# Patient Record
Sex: MALE | Race: WHITE | NOT HISPANIC OR LATINO | Employment: OTHER
[De-identification: names, ages, dates, MRNs, and addresses within clinical notes are randomized per-mention and may not be internally consistent; named-entity substitution may affect disease eponyms.]

---

## 2021-06-09 ENCOUNTER — RECORDS - HEALTHEAST (OUTPATIENT)
Dept: ADMINISTRATIVE | Facility: OTHER | Age: 31
End: 2021-06-09

## 2021-06-11 ENCOUNTER — AMBULATORY - HEALTHEAST (OUTPATIENT)
Dept: CT IMAGING | Facility: HOSPITAL | Age: 31
End: 2021-06-11

## 2021-06-11 ENCOUNTER — AMBULATORY - HEALTHEAST (OUTPATIENT)
Dept: ULTRASOUND IMAGING | Facility: HOSPITAL | Age: 31
End: 2021-06-11

## 2021-06-11 ENCOUNTER — RECORDS - HEALTHEAST (OUTPATIENT)
Dept: ADMINISTRATIVE | Facility: OTHER | Age: 31
End: 2021-06-11

## 2021-06-11 DIAGNOSIS — Z11.59 ENCOUNTER FOR SCREENING FOR OTHER VIRAL DISEASES: ICD-10-CM

## 2021-06-15 ENCOUNTER — RECORDS - HEALTHEAST (OUTPATIENT)
Dept: ADMINISTRATIVE | Facility: OTHER | Age: 31
End: 2021-06-15

## 2021-06-16 ENCOUNTER — RECORDS - HEALTHEAST (OUTPATIENT)
Dept: ADMINISTRATIVE | Facility: OTHER | Age: 31
End: 2021-06-16

## 2021-06-18 ENCOUNTER — AMBULATORY - HEALTHEAST (OUTPATIENT)
Dept: LAB | Facility: CLINIC | Age: 31
End: 2021-06-18

## 2021-06-18 DIAGNOSIS — Z11.59 ENCOUNTER FOR SCREENING FOR OTHER VIRAL DISEASES: ICD-10-CM

## 2021-06-19 LAB
SARS-COV-2 PCR COMMENT: NORMAL
SARS-COV-2 RNA SPEC QL NAA+PROBE: NEGATIVE
SARS-COV-2 VIRUS SPECIMEN SOURCE: NORMAL

## 2021-06-20 ENCOUNTER — COMMUNICATION - HEALTHEAST (OUTPATIENT)
Dept: SCHEDULING | Facility: CLINIC | Age: 31
End: 2021-06-20

## 2021-06-21 ENCOUNTER — RECORDS - HEALTHEAST (OUTPATIENT)
Dept: CT IMAGING | Facility: HOSPITAL | Age: 31
End: 2021-06-21

## 2021-06-21 ENCOUNTER — RECORDS - HEALTHEAST (OUTPATIENT)
Dept: ULTRASOUND IMAGING | Facility: HOSPITAL | Age: 31
End: 2021-06-21

## 2021-06-21 ENCOUNTER — HOSPITAL ENCOUNTER (OUTPATIENT)
Dept: CT IMAGING | Facility: HOSPITAL | Age: 31
Discharge: HOME | End: 2021-06-21

## 2021-06-21 ENCOUNTER — HOSPITAL ENCOUNTER (OUTPATIENT)
Dept: ULTRASOUND IMAGING | Facility: HOSPITAL | Age: 31
Discharge: HOME | End: 2021-06-21

## 2021-06-21 DIAGNOSIS — R79.89 ABNORMAL LFTS: ICD-10-CM

## 2021-06-21 LAB
HGB BLD-MCNC: 12.9 G/DL (ref 14–18)
INR PPP: 1.28 (ref 0.9–1.1)
PLATELET # BLD AUTO: 157 THOU/UL (ref 140–440)

## 2021-06-21 ASSESSMENT — MIFFLIN-ST. JEOR: SCORE: 1852.11

## 2021-06-22 ENCOUNTER — COMMUNICATION - HEALTHEAST (OUTPATIENT)
Dept: SCHEDULING | Facility: CLINIC | Age: 31
End: 2021-06-22

## 2021-06-23 LAB
LAB AP CHARGES (HE HISTORICAL CONVERSION): NORMAL
PATH REPORT.COMMENTS IMP SPEC: NORMAL
PATH REPORT.COMMENTS IMP SPEC: NORMAL
PATH REPORT.FINAL DX SPEC: NORMAL
PATH REPORT.GROSS SPEC: NORMAL
PATH REPORT.MICROSCOPIC SPEC OTHER STN: NORMAL
PATH REPORT.RELEVANT HX SPEC: NORMAL
RESULT FLAG (HE HISTORICAL CONVERSION): NORMAL

## 2021-07-20 VITALS — WEIGHT: 201 LBS | HEIGHT: 69 IN | BODY MASS INDEX: 29.77 KG/M2

## 2021-07-20 NOTE — PROCEDURES
[facility]    Procedure: IR Procedure Note    Date/Time: 6/21/2021 11:17 AM  Performed by: Long Everett MD  Authorized by: Long Everett MD       Universal Protocol    Site marked: Yes    Prior images obtained and reviewed: Yes    Required items: required blood products, implants, devices, and special equipment available    Patient identity confirmed: verbally with patient    Reevaluation: Patient was reevaluated immediately before administering moderate or deep sedation or anesthesia    Confirmation checklist: patient's identity using two indicators, relevant allergies, procedure was appropriate and matched the consent or emergent situation and correct equipment/implants were available    Time out: Immediately prior to procedure a time out was called to verify the correct patient, procedure, equipment, support staff and site/side marked as required    Universal Protocol: Joint Commission Universal Protocol was followed    Preparation: Patient was prepped and draped in the usual sterile fashion    ESBL (mL): 0    Anesthesia    Local anesthesia used?: Yes    Local anesthetic: lidocaine 1% without epinephrine    Anesthetic total (mL): 15    Sedation    Patient sedation: Yes    Sedation type: moderate (conscious) sedation    Sedation: midazolam, fentanyl and see MAR for details    Vital signs: Vital signs monitored during sedation  Specimens: core needle biopsy specimens sent for pathological analysis  Complications: None  Condition: Stable  Plan: Recovery for 3 hours then discharge to home.    Post-procedure    Description of procedure: US guided parenchymal biopsy left hepatic lobe    Patient tolerance: Patient tolerated the procedure well with no immediate complications   Length of time physician present for 1:1 monitoring during sedation: 15

## 2021-07-20 NOTE — PRE-PROCEDURE
Procedure Name: US guided liver parenchyma biopsy and moderate conscious sedation  Date/Time: 6/21/2021 10:13 AM    Verbal consent obtained?: Yes  Written consent obtained?: Yes  Risks and benefits: Risks, benefits and alternatives were discussed  Discharge plan: Appropriate discharge home plan in place for patients who are going home after procedure   Consent given by: patient  Expected level of sedation: moderate  ASA Class: Class 1- healthy patient  Mallampati: Grade 1- soft palate, uvula, tonsillar pillars, and posterior pharyngeal wall visible  Patient states understanding of procedure being performed: Yes  Patient's understanding of procedure matches consent: Yes  Procedure consent matches procedure scheduled: Yes  Appropriately NPO: yes  Lungs: lungs clear with good breath sounds bilaterally  Heart: normal heart sounds and rate  History & Physical reviewed: History and physical reviewed and no updates needed  Statement of review: I have reviewed the lab findings, diagnostic data, medications, and the plan for sedation

## 2022-08-13 ENCOUNTER — HOSPITAL ENCOUNTER (INPATIENT)
Facility: CLINIC | Age: 32
LOS: 6 days | Discharge: HOME | DRG: 896 | End: 2022-08-19
Attending: EMERGENCY MEDICINE | Admitting: INTERNAL MEDICINE
Payer: COMMERCIAL

## 2022-08-13 ENCOUNTER — APPOINTMENT (OUTPATIENT)
Dept: ULTRASOUND IMAGING | Facility: CLINIC | Age: 32
DRG: 896 | End: 2022-08-13
Attending: EMERGENCY MEDICINE
Payer: COMMERCIAL

## 2022-08-13 DIAGNOSIS — Z20.822 CONTACT WITH AND (SUSPECTED) EXPOSURE TO COVID-19: ICD-10-CM

## 2022-08-13 DIAGNOSIS — F41.9 ANXIETY: ICD-10-CM

## 2022-08-13 DIAGNOSIS — K72.00 ACUTE LIVER FAILURE WITHOUT HEPATIC COMA: ICD-10-CM

## 2022-08-13 DIAGNOSIS — E83.39 HYPOPHOSPHATEMIA: ICD-10-CM

## 2022-08-13 DIAGNOSIS — F10.232 ALCOHOL DEPENDENCE WITH WITHDRAWAL WITH PERCEPTUAL DISTURBANCE (H): ICD-10-CM

## 2022-08-13 DIAGNOSIS — K70.10 ALCOHOLIC HEPATITIS WITHOUT ASCITES (H): Primary | ICD-10-CM

## 2022-08-13 LAB
ABO/RH(D): NORMAL
ALBUMIN SERPL-MCNC: 2.8 G/DL (ref 3.4–5)
ALBUMIN UR-MCNC: 100 MG/DL
ALCOHOL BREATH TEST: 0 (ref 0–0.01)
ALP SERPL-CCNC: 219 U/L (ref 40–150)
ALT SERPL W P-5'-P-CCNC: 41 U/L (ref 0–70)
AMPHETAMINES UR QL SCN: NORMAL
ANION GAP SERPL CALCULATED.3IONS-SCNC: 10 MMOL/L (ref 3–14)
ANTIBODY SCREEN: NEGATIVE
APAP SERPL-MCNC: NORMAL UG/ML
APPEARANCE UR: ABNORMAL
AST SERPL W P-5'-P-CCNC: 217 U/L (ref 0–45)
BACTERIA #/AREA URNS HPF: ABNORMAL /HPF
BARBITURATES UR QL: NORMAL
BASOPHILS # BLD AUTO: 0.1 10E3/UL (ref 0–0.2)
BASOPHILS # BLD AUTO: 0.1 10E3/UL (ref 0–0.2)
BASOPHILS NFR BLD AUTO: 1 %
BASOPHILS NFR BLD AUTO: 1 %
BENZODIAZ UR QL: NORMAL
BILIRUB SERPL-MCNC: 20.6 MG/DL (ref 0.2–1.3)
BILIRUB UR QL STRIP: ABNORMAL
BUN SERPL-MCNC: 9 MG/DL (ref 7–30)
CALCIUM SERPL-MCNC: 8.1 MG/DL (ref 8.5–10.1)
CANNABINOIDS UR QL SCN: NORMAL
CHLORIDE BLD-SCNC: 94 MMOL/L (ref 94–109)
CO2 SERPL-SCNC: 27 MMOL/L (ref 20–32)
COCAINE UR QL: NORMAL
COLOR UR AUTO: ABNORMAL
CREAT SERPL-MCNC: 0.54 MG/DL (ref 0.66–1.25)
CRP SERPL-MCNC: 57 MG/L (ref 0–8)
EOSINOPHIL # BLD AUTO: 0.1 10E3/UL (ref 0–0.7)
EOSINOPHIL # BLD AUTO: 0.2 10E3/UL (ref 0–0.7)
EOSINOPHIL NFR BLD AUTO: 1 %
EOSINOPHIL NFR BLD AUTO: 2 %
ERYTHROCYTE [DISTWIDTH] IN BLOOD BY AUTOMATED COUNT: 20.5 % (ref 10–15)
ERYTHROCYTE [DISTWIDTH] IN BLOOD BY AUTOMATED COUNT: 20.8 % (ref 10–15)
ETHANOL UR QL SCN: NORMAL
FERRITIN SERPL-MCNC: 111 NG/ML (ref 26–388)
GFR SERPL CREATININE-BSD FRML MDRD: >90 ML/MIN/1.73M2
GLUCOSE BLD-MCNC: 101 MG/DL (ref 70–99)
GLUCOSE UR STRIP-MCNC: 50 MG/DL
HCT VFR BLD AUTO: 27.7 % (ref 40–53)
HCT VFR BLD AUTO: 30.3 % (ref 40–53)
HGB BLD-MCNC: 8.9 G/DL (ref 13.3–17.7)
HGB BLD-MCNC: 9.7 G/DL (ref 13.3–17.7)
HGB UR QL STRIP: ABNORMAL
HOLD SPECIMEN: NORMAL
IMM GRANULOCYTES # BLD: 0.1 10E3/UL
IMM GRANULOCYTES # BLD: 0.1 10E3/UL
IMM GRANULOCYTES NFR BLD: 1 %
IMM GRANULOCYTES NFR BLD: 1 %
INR PPP: 1.86 (ref 0.85–1.15)
IRON SATN MFR SERPL: 30 % (ref 15–46)
IRON SERPL-MCNC: 81 UG/DL (ref 35–180)
KETONES UR STRIP-MCNC: NEGATIVE MG/DL
LACTATE SERPL-SCNC: 1.9 MMOL/L (ref 0.7–2)
LDH SERPL L TO P-CCNC: 315 U/L (ref 85–227)
LEUKOCYTE ESTERASE UR QL STRIP: NEGATIVE
LYMPHOCYTES # BLD AUTO: 0.9 10E3/UL (ref 0.8–5.3)
LYMPHOCYTES # BLD AUTO: 1.2 10E3/UL (ref 0.8–5.3)
LYMPHOCYTES NFR BLD AUTO: 10 %
LYMPHOCYTES NFR BLD AUTO: 10 %
MAGNESIUM SERPL-MCNC: 1 MG/DL (ref 1.6–2.3)
MCH RBC QN AUTO: 27.3 PG (ref 26.5–33)
MCH RBC QN AUTO: 27.9 PG (ref 26.5–33)
MCHC RBC AUTO-ENTMCNC: 32 G/DL (ref 31.5–36.5)
MCHC RBC AUTO-ENTMCNC: 32.1 G/DL (ref 31.5–36.5)
MCV RBC AUTO: 85 FL (ref 78–100)
MCV RBC AUTO: 87 FL (ref 78–100)
MONOCYTES # BLD AUTO: 0.9 10E3/UL (ref 0–1.3)
MONOCYTES # BLD AUTO: 1.2 10E3/UL (ref 0–1.3)
MONOCYTES NFR BLD AUTO: 10 %
MONOCYTES NFR BLD AUTO: 11 %
MUCOUS THREADS #/AREA URNS LPF: PRESENT /LPF
NEUTROPHILS # BLD AUTO: 7.2 10E3/UL (ref 1.6–8.3)
NEUTROPHILS # BLD AUTO: 9 10E3/UL (ref 1.6–8.3)
NEUTROPHILS NFR BLD AUTO: 76 %
NEUTROPHILS NFR BLD AUTO: 76 %
NITRATE UR QL: NEGATIVE
NRBC # BLD AUTO: 0 10E3/UL
NRBC # BLD AUTO: 0 10E3/UL
NRBC BLD AUTO-RTO: 0 /100
NRBC BLD AUTO-RTO: 0 /100
OPIATES UR QL SCN: NORMAL
PH UR STRIP: 5.5 [PH] (ref 5–7)
PHOSPHATE SERPL-MCNC: 1.7 MG/DL (ref 2.5–4.5)
PLATELET # BLD AUTO: 123 10E3/UL (ref 150–450)
PLATELET # BLD AUTO: 146 10E3/UL (ref 150–450)
POTASSIUM BLD-SCNC: 3.3 MMOL/L (ref 3.4–5.3)
PROCALCITONIN SERPL-MCNC: 0.66 NG/ML
PROT SERPL-MCNC: 8 G/DL (ref 6.8–8.8)
RBC # BLD AUTO: 3.19 10E6/UL (ref 4.4–5.9)
RBC # BLD AUTO: 3.55 10E6/UL (ref 4.4–5.9)
RBC URINE: 0 /HPF
RETICS # AUTO: 0.08 10E6/UL (ref 0.03–0.1)
RETICS/RBC NFR AUTO: 2.5 % (ref 0.5–2)
SARS-COV-2 RNA RESP QL NAA+PROBE: NEGATIVE
SODIUM SERPL-SCNC: 131 MMOL/L (ref 133–144)
SP GR UR STRIP: 1.03 (ref 1–1.03)
SPECIMEN EXPIRATION DATE: NORMAL
SQUAMOUS EPITHELIAL: 3 /HPF
TIBC SERPL-MCNC: 266 UG/DL (ref 240–430)
UROBILINOGEN UR STRIP-MCNC: 2 MG/DL
WBC # BLD AUTO: 11.7 10E3/UL (ref 4–11)
WBC # BLD AUTO: 9.4 10E3/UL (ref 4–11)
WBC URINE: 19 /HPF

## 2022-08-13 PROCEDURE — 36415 COLL VENOUS BLD VENIPUNCTURE: CPT | Performed by: PHYSICIAN ASSISTANT

## 2022-08-13 PROCEDURE — 86140 C-REACTIVE PROTEIN: CPT | Performed by: PHYSICIAN ASSISTANT

## 2022-08-13 PROCEDURE — 86706 HEP B SURFACE ANTIBODY: CPT | Performed by: PHYSICIAN ASSISTANT

## 2022-08-13 PROCEDURE — 83605 ASSAY OF LACTIC ACID: CPT | Performed by: EMERGENCY MEDICINE

## 2022-08-13 PROCEDURE — 258N000003 HC RX IP 258 OP 636: Performed by: PHYSICIAN ASSISTANT

## 2022-08-13 PROCEDURE — 87635 SARS-COV-2 COVID-19 AMP PRB: CPT | Performed by: EMERGENCY MEDICINE

## 2022-08-13 PROCEDURE — 99285 EMERGENCY DEPT VISIT HI MDM: CPT | Performed by: EMERGENCY MEDICINE

## 2022-08-13 PROCEDURE — 80143 DRUG ASSAY ACETAMINOPHEN: CPT | Performed by: PHYSICIAN ASSISTANT

## 2022-08-13 PROCEDURE — 86850 RBC ANTIBODY SCREEN: CPT | Performed by: EMERGENCY MEDICINE

## 2022-08-13 PROCEDURE — 96375 TX/PRO/DX INJ NEW DRUG ADDON: CPT | Performed by: EMERGENCY MEDICINE

## 2022-08-13 PROCEDURE — 36415 COLL VENOUS BLD VENIPUNCTURE: CPT | Performed by: EMERGENCY MEDICINE

## 2022-08-13 PROCEDURE — 82040 ASSAY OF SERUM ALBUMIN: CPT | Performed by: EMERGENCY MEDICINE

## 2022-08-13 PROCEDURE — 84145 PROCALCITONIN (PCT): CPT | Performed by: PHYSICIAN ASSISTANT

## 2022-08-13 PROCEDURE — 84466 ASSAY OF TRANSFERRIN: CPT | Performed by: PHYSICIAN ASSISTANT

## 2022-08-13 PROCEDURE — 250N000011 HC RX IP 250 OP 636: Performed by: PHYSICIAN ASSISTANT

## 2022-08-13 PROCEDURE — 82728 ASSAY OF FERRITIN: CPT | Performed by: PHYSICIAN ASSISTANT

## 2022-08-13 PROCEDURE — 86708 HEPATITIS A ANTIBODY: CPT | Performed by: PHYSICIAN ASSISTANT

## 2022-08-13 PROCEDURE — 80307 DRUG TEST PRSMV CHEM ANLYZR: CPT | Performed by: EMERGENCY MEDICINE

## 2022-08-13 PROCEDURE — 99285 EMERGENCY DEPT VISIT HI MDM: CPT | Mod: 25 | Performed by: EMERGENCY MEDICINE

## 2022-08-13 PROCEDURE — 85025 COMPLETE CBC W/AUTO DIFF WBC: CPT | Performed by: EMERGENCY MEDICINE

## 2022-08-13 PROCEDURE — 83735 ASSAY OF MAGNESIUM: CPT | Performed by: EMERGENCY MEDICINE

## 2022-08-13 PROCEDURE — 258N000001 HC RX 258: Performed by: EMERGENCY MEDICINE

## 2022-08-13 PROCEDURE — 85045 AUTOMATED RETICULOCYTE COUNT: CPT | Performed by: PHYSICIAN ASSISTANT

## 2022-08-13 PROCEDURE — 250N000011 HC RX IP 250 OP 636: Performed by: EMERGENCY MEDICINE

## 2022-08-13 PROCEDURE — 83010 ASSAY OF HAPTOGLOBIN QUANT: CPT | Performed by: PHYSICIAN ASSISTANT

## 2022-08-13 PROCEDURE — 83550 IRON BINDING TEST: CPT | Performed by: PHYSICIAN ASSISTANT

## 2022-08-13 PROCEDURE — 83615 LACTATE (LD) (LDH) ENZYME: CPT | Performed by: PHYSICIAN ASSISTANT

## 2022-08-13 PROCEDURE — 82746 ASSAY OF FOLIC ACID SERUM: CPT | Performed by: PHYSICIAN ASSISTANT

## 2022-08-13 PROCEDURE — 250N000009 HC RX 250: Performed by: EMERGENCY MEDICINE

## 2022-08-13 PROCEDURE — 200N000002 HC R&B ICU UMMC

## 2022-08-13 PROCEDURE — 96366 THER/PROPH/DIAG IV INF ADDON: CPT | Performed by: EMERGENCY MEDICINE

## 2022-08-13 PROCEDURE — 82607 VITAMIN B-12: CPT | Performed by: PHYSICIAN ASSISTANT

## 2022-08-13 PROCEDURE — 87389 HIV-1 AG W/HIV-1&-2 AB AG IA: CPT | Performed by: PHYSICIAN ASSISTANT

## 2022-08-13 PROCEDURE — HZ2ZZZZ DETOXIFICATION SERVICES FOR SUBSTANCE ABUSE TREATMENT: ICD-10-PCS | Performed by: EMERGENCY MEDICINE

## 2022-08-13 PROCEDURE — 250N000013 HC RX MED GY IP 250 OP 250 PS 637: Performed by: PHYSICIAN ASSISTANT

## 2022-08-13 PROCEDURE — 84425 ASSAY OF VITAMIN B-1: CPT | Performed by: PHYSICIAN ASSISTANT

## 2022-08-13 PROCEDURE — 87040 BLOOD CULTURE FOR BACTERIA: CPT | Performed by: PHYSICIAN ASSISTANT

## 2022-08-13 PROCEDURE — 80053 COMPREHEN METABOLIC PANEL: CPT | Performed by: EMERGENCY MEDICINE

## 2022-08-13 PROCEDURE — 87340 HEPATITIS B SURFACE AG IA: CPT | Performed by: PHYSICIAN ASSISTANT

## 2022-08-13 PROCEDURE — 84100 ASSAY OF PHOSPHORUS: CPT | Performed by: PHYSICIAN ASSISTANT

## 2022-08-13 PROCEDURE — 81003 URINALYSIS AUTO W/O SCOPE: CPT | Performed by: PHYSICIAN ASSISTANT

## 2022-08-13 PROCEDURE — 76700 US EXAM ABDOM COMPLETE: CPT

## 2022-08-13 PROCEDURE — 86803 HEPATITIS C AB TEST: CPT | Performed by: PHYSICIAN ASSISTANT

## 2022-08-13 PROCEDURE — 96365 THER/PROPH/DIAG IV INF INIT: CPT | Mod: 59 | Performed by: EMERGENCY MEDICINE

## 2022-08-13 PROCEDURE — 86709 HEPATITIS A IGM ANTIBODY: CPT | Performed by: PHYSICIAN ASSISTANT

## 2022-08-13 PROCEDURE — C9803 HOPD COVID-19 SPEC COLLECT: HCPCS | Performed by: EMERGENCY MEDICINE

## 2022-08-13 PROCEDURE — 87086 URINE CULTURE/COLONY COUNT: CPT | Performed by: PHYSICIAN ASSISTANT

## 2022-08-13 PROCEDURE — 85610 PROTHROMBIN TIME: CPT | Performed by: EMERGENCY MEDICINE

## 2022-08-13 PROCEDURE — 99223 1ST HOSP IP/OBS HIGH 75: CPT | Mod: AI | Performed by: INTERNAL MEDICINE

## 2022-08-13 PROCEDURE — 99207 PR APP CREDIT; MD BILLING SHARED VISIT: CPT | Performed by: PHYSICIAN ASSISTANT

## 2022-08-13 PROCEDURE — 96367 TX/PROPH/DG ADDL SEQ IV INF: CPT | Performed by: EMERGENCY MEDICINE

## 2022-08-13 PROCEDURE — 85025 COMPLETE CBC W/AUTO DIFF WBC: CPT | Performed by: PHYSICIAN ASSISTANT

## 2022-08-13 PROCEDURE — 250N000013 HC RX MED GY IP 250 OP 250 PS 637: Performed by: EMERGENCY MEDICINE

## 2022-08-13 RX ORDER — AMOXICILLIN 250 MG
2 CAPSULE ORAL 2 TIMES DAILY PRN
Status: DISCONTINUED | OUTPATIENT
Start: 2022-08-13 | End: 2022-08-15

## 2022-08-13 RX ORDER — FLUMAZENIL 0.1 MG/ML
0.2 INJECTION, SOLUTION INTRAVENOUS
Status: DISCONTINUED | OUTPATIENT
Start: 2022-08-13 | End: 2022-08-14

## 2022-08-13 RX ORDER — LORAZEPAM 1 MG/1
1-2 TABLET ORAL EVERY 30 MIN PRN
Status: DISCONTINUED | OUTPATIENT
Start: 2022-08-13 | End: 2022-08-14

## 2022-08-13 RX ORDER — ONDANSETRON 2 MG/ML
4 INJECTION INTRAMUSCULAR; INTRAVENOUS EVERY 6 HOURS PRN
Status: DISCONTINUED | OUTPATIENT
Start: 2022-08-13 | End: 2022-08-14

## 2022-08-13 RX ORDER — MAGNESIUM SULFATE HEPTAHYDRATE 40 MG/ML
2 INJECTION, SOLUTION INTRAVENOUS ONCE
Status: COMPLETED | OUTPATIENT
Start: 2022-08-13 | End: 2022-08-13

## 2022-08-13 RX ORDER — DIAZEPAM 5 MG
5-20 TABLET ORAL EVERY 30 MIN PRN
Status: DISCONTINUED | OUTPATIENT
Start: 2022-08-13 | End: 2022-08-13 | Stop reason: CLARIF

## 2022-08-13 RX ORDER — LORAZEPAM 2 MG/ML
1-2 INJECTION INTRAMUSCULAR EVERY 30 MIN PRN
Status: DISCONTINUED | OUTPATIENT
Start: 2022-08-13 | End: 2022-08-14

## 2022-08-13 RX ORDER — MULTIPLE VITAMINS W/ MINERALS TAB 9MG-400MCG
1 TAB ORAL DAILY
Status: DISCONTINUED | OUTPATIENT
Start: 2022-08-13 | End: 2022-08-19 | Stop reason: HOSPADM

## 2022-08-13 RX ORDER — OLANZAPINE 5 MG/1
5-10 TABLET, ORALLY DISINTEGRATING ORAL EVERY 6 HOURS PRN
Status: DISCONTINUED | OUTPATIENT
Start: 2022-08-13 | End: 2022-08-15

## 2022-08-13 RX ORDER — AMOXICILLIN 250 MG
1 CAPSULE ORAL 2 TIMES DAILY PRN
Status: DISCONTINUED | OUTPATIENT
Start: 2022-08-13 | End: 2022-08-15

## 2022-08-13 RX ORDER — HALOPERIDOL 5 MG/ML
2.5-5 INJECTION INTRAMUSCULAR EVERY 6 HOURS PRN
Status: DISCONTINUED | OUTPATIENT
Start: 2022-08-13 | End: 2022-08-15

## 2022-08-13 RX ORDER — ONDANSETRON 4 MG/1
4 TABLET, ORALLY DISINTEGRATING ORAL EVERY 6 HOURS PRN
Status: DISCONTINUED | OUTPATIENT
Start: 2022-08-13 | End: 2022-08-14

## 2022-08-13 RX ORDER — FOLIC ACID 1 MG/1
1 TABLET ORAL DAILY
Status: DISCONTINUED | OUTPATIENT
Start: 2022-08-13 | End: 2022-08-14

## 2022-08-13 RX ORDER — LIDOCAINE 40 MG/G
CREAM TOPICAL
Status: DISCONTINUED | OUTPATIENT
Start: 2022-08-13 | End: 2022-08-14

## 2022-08-13 RX ORDER — DIAZEPAM 10 MG/2ML
5 INJECTION, SOLUTION INTRAMUSCULAR; INTRAVENOUS ONCE
Status: COMPLETED | OUTPATIENT
Start: 2022-08-13 | End: 2022-08-13

## 2022-08-13 RX ADMIN — OLANZAPINE 10 MG: 5 TABLET, ORALLY DISINTEGRATING ORAL at 23:28

## 2022-08-13 RX ADMIN — FOLIC ACID: 5 INJECTION, SOLUTION INTRAMUSCULAR; INTRAVENOUS; SUBCUTANEOUS at 14:42

## 2022-08-13 RX ADMIN — MAGNESIUM SULFATE 2 G: 2 INJECTION INTRAVENOUS at 15:51

## 2022-08-13 RX ADMIN — LORAZEPAM 2 MG: 1 TABLET ORAL at 20:36

## 2022-08-13 RX ADMIN — DIAZEPAM 10 MG: 5 TABLET ORAL at 15:51

## 2022-08-13 RX ADMIN — FOLIC ACID 1 MG: 1 TABLET ORAL at 20:27

## 2022-08-13 RX ADMIN — LORAZEPAM 2 MG: 1 TABLET ORAL at 21:36

## 2022-08-13 RX ADMIN — THIAMINE HYDROCHLORIDE 500 MG: 100 INJECTION, SOLUTION INTRAMUSCULAR; INTRAVENOUS at 20:27

## 2022-08-13 RX ADMIN — Medication 5 MG: at 22:30

## 2022-08-13 RX ADMIN — DIAZEPAM 5 MG: 5 INJECTION, SOLUTION INTRAMUSCULAR; INTRAVENOUS at 14:41

## 2022-08-13 RX ADMIN — OLANZAPINE 10 MG: 5 TABLET, ORALLY DISINTEGRATING ORAL at 22:30

## 2022-08-13 RX ADMIN — MULTIPLE VITAMINS W/ MINERALS TAB 1 TABLET: TAB at 20:27

## 2022-08-13 RX ADMIN — DIAZEPAM 5 MG: 5 TABLET ORAL at 14:41

## 2022-08-13 RX ADMIN — LORAZEPAM 2 MG: 1 TABLET ORAL at 23:02

## 2022-08-13 RX ADMIN — DIAZEPAM 10 MG: 5 TABLET ORAL at 18:24

## 2022-08-13 RX ADMIN — SODIUM CHLORIDE 1000 ML: 9 INJECTION, SOLUTION INTRAVENOUS at 20:27

## 2022-08-13 ASSESSMENT — ACTIVITIES OF DAILY LIVING (ADL)
ADLS_ACUITY_SCORE: 35

## 2022-08-13 NOTE — H&P
[facility]    History and Physical - Hospitalist Service, GOLD TEAM        Date of Admission:  8/13/2022    Assessment & Plan      Uriel Soria is a very pleasant 32 year old  male patient with two small children (1.6yo and 5yo) with a past medical history significant for gout and etoh use disorder complicated by hepatomegaly and elevated LFTs who presented to [facility] ED requesting detox. Was found to have bilirubin ~20 and subsequently will be admitted to [place] for presumed alcoholic hepatitis.    Acute Alcohol Withdrawal  Alcohol Use Disorder  Patient reports on average consuming 1/2-3/4L of whiskey daily. No known seizure events. His last drink was Wednesday evening 8/10/22 and since that time has noticed his skin looks yellow and urine appears dark. UDS negative. Breathalyzer with undetectable etoh level. Patient reports he feels tremulous and has had a few hallucinations since he stopped drinking etoh. On further investigation, it appears these were significant hallucinations involving monsters who were hovering around his bed, talking in his living room and creeping in the shadows and around corners when they thought he was not looking last night. He saw children come through his bedroom window. He saw a man with a disfigured face run into his son's room. On arrival to ED, he saw multiple people (who his wife confirmed were not truly there) during different instances. Patient is likely at peak withdrawal based on his last drink 8/10/22. His hallucinations have resolved thus far in ED after benzodiazepines.   - Etoh withdrawal protocol with CIWA scoring and ativan prn (safer than valium in acute liver failure). Zyprexa available if needed in addition to benzodiazepines.   - Will check thiamine level and start high dose thiamine protocol given hallucinations and he reports some gait disturbances lately.   - Recommend psychiatry consult when he  [facility].   - Electrolyte replacement    Acute Alcoholic Hepatitis  Acute on Chronic Liver Failure  Elevated Bilirubin - Jaundice  At admission, total bilirubin 20.6, , ALT 41, Alk Phos 219. INR 1.86. Creatinine 0.54. WBC 11.7. US Abdomen w Doppler done 8/13/22 revealed hepatomegaly with diffuse parenchymal echogenicity, reversal of flow throughout protal veins and splenic vein without thrombosis, splenomegaly, small volume ascites. It appears he had a liver biopsy 6/21/21 (steatohepatitis) and MR Abdomen w/wo contrast 5/4/21 (see care everywhere). In June 2021, INR 1.28 and LFTs with total bili 5.9, , ALT 84, Alk Phos 111. Reports he has never had an EGD.  MELD-Na score: 28 at 8/13/2022  2:16 PM  MELD score: 25 at 8/13/2022  2:16 PM  Calculated from:  Serum Creatinine: 0.54 mg/dL (Using min of 1 mg/dL) at 8/13/2022  2:16 PM  Serum Sodium: 131 mmol/L at 8/13/2022  2:16 PM  Total Bilirubin: 20.6 mg/dL at 8/13/2022  2:16 PM  INR(ratio): 1.86 at 8/13/2022  2:16 PM  Age: 32 years    - GI consult to determine indication for steroids and assist in further work up.   - Check CRP, procalc, blood cultures, Hep A/B/C and HIV. LDH and Haptoglobin ordered in off-chance he is hemolyzing (though doubt it). Check tylenol level.   - SBP possibility with elevated WBC count but given afebrile and no abdominal pain, will hold off on antibiotics. CAPS consult for paracentesis tomorrow.    Heart Murmur  Murmur noted heard best right sternal border.   - Given above history of etoh and elevated WBC, will check TTE.   - Check EKG for baseline    Thrombocytopenia  Anemia Normocytic  Platelets 146 at admission; previously normal in 2021. Hemoglobin 9.7 at admission; was 12.9 ~1 year ago.    - Highly likely related to etoh use. However, given elevated bilirubin, will check peripheral smear, haptoglobin and LDH to ensure not hemolyzing.   - Check iron studies, B12, folate    Hyponatremia  Sodium 131 at admission. Likely  "related to poor nutrition and etoh use.   - IVF bolus NS overnight and recheck in am    Hypokalemia  Hypomagnesemia  Hypophosphatemia  Potassium 3.3 at admission. Magnesium 1.0. Phosphorus 1.7.   - Possible risk for refeeding once his appetite improves so we will aggressively replace electrolytes.    Hypocalcemia  Ca 8.1 related to albumin 2.8.       Diet: Advance Diet as Tolerated: Clear Liquid Diet, patient suspects he is gluten intolerant  DVT Prophylaxis: Pneumatic Compression Devices  [place] Catheter: Not present  Central Lines: None  Cardiac Monitoring: None  Code Status: Full Code    Clinically Significant Risk Factors Present on Admission         # Hyponatremia: Na = 131 mmol/L (Ref range: 133 - 144 mmol/L) on admission, will monitor as appropriate   # Hypomagnesemia: Mg = 1.0 mg/dL (Ref range: 1.6 - 2.3 mg/dL) on admission, will replace as needed   # Hypoalbuminemia: Albumin = 2.8 g/dL (Ref range: 3.4 - 5.0 g/dL) on admission, will monitor as appropriate   # Coagulation Defect: INR = 1.86 (Ref range: 0.85 - 1.15) and/or PTT = N/A on admission, will monitor for bleeding      # Overweight: Estimated body mass index is 28.52 kg/m  as calculated from the following:    Height as of this encounter: 1.753 m (5' 9\").    Weight as of this encounter: 87.6 kg (193 lb 1.6 oz).        Disposition Plan    TBD     The patient's care was discussed with the Attending Physician, Dr. Dr. Chavez.    Cam Hurtado PA-C  Hospitalist Service, [facility]  Securely message with the Vocera Web Console (learn more here)  Text page via [facility] Paging/Directory   Please see signed in provider for up to date coverage information      ______________________________________________________________________    Chief Complaint   Etoh Withdrawal, Jaundice    History is obtained from the patient and EMR.    History of Present Illness   Uriel Soria is a very pleasant 32 year old "  male patient with two small children (1.4yo and 3yo) with a past medical history significant for gout and etoh use disorder complicated by hepatomegaly and elevated LFTs who presented to [facility] ED requesting detox. Was found to have bilirubin ~20 and subsequently will be admitted to [place] for presumed alcoholic hepatitis.    Patient reports on average consuming 1/2-3/4L of whiskey daily. No known seizure events. His last drink was Wednesday evening 8/10/22 and since that time has noticed his skin looks yellow and urine appears dark. Patient reports he feels tremulous and has had a few hallucinations since he stopped drinking etoh. On further investigation, it appears these were significant hallucinations involving monsters who were hovering around his bed, talking in his living room and creeping in the shadows and around corners when they thought he was not looking last night. He saw children come through his bedroom window. He saw a man with a disfigured face run into his son's room. On arrival to ED, he saw multiple people (who his wife confirmed were not truly there) during different instances. No hallucinations at present moment. No fevers, chills, chest pain/pressure, shortness of breath, nausea, vomiting, abdominal pain, dysuria reported.    Review of Systems    The 10 point Review of Systems is negative other than noted in the HPI or here.    Past Medical History    I have reviewed this patient's medical history and updated it with pertinent information if needed.   Past Medical History:   Diagnosis Date     Substance abuse (H)        Past Surgical History   I have reviewed this patient's surgical history and updated it with pertinent information if needed.  Past Surgical History:   Procedure Laterality Date     BIOPSY         Social History   I have reviewed this patient's social history and updated it with pertinent information if needed.  Social History     Tobacco Use     Smoking  status: Never Smoker     Smokeless tobacco: Former User     Types: Chew   Substance Use Topics     Alcohol use: Yes     Comment: 4 whiskey beverages daily     Drug use: Not Currently       Family History   Medical History Relation Name Comments   No Known Problems Birth Father        No Known Problems Birth Mother        Cancer, [place] Paternal Grandmother        No Known Problems Sister          Family History  Relation Name Status Comments   Birth Father    Alive     Birth Mother    Alive     Maternal Grandfather     (Age 82) had MI and bypass   Maternal Grandmother     (Age 87) old age   Paternal Grandfather     old age   Paternal Grandmother     stomach cancer   Sister    Alive           Prior to Admission Medications   None     Allergies   No Known Allergies    Physical Exam   Vital Signs: Temp: 98.4  F (36.9  C) Temp src: Oral BP: 118/77 Pulse: 107   Resp: 16 SpO2: 96 %      Weight: 193 lbs 1.6 oz    GENERAL: Alert and oriented x 3. Well nourished, well developed.  No acute distress.    HEENT: Normocephalic, atraumatic. Scleral icterus.  Mucous membranes moist. Pupils PERRLA without nystagmus. Mild tongue fasciculations.  CV: RRR. S1, S2. 2/6 murmur noted right sternal border.  RESPIRATORY: Effort normal on room air. Lungs CTAB with no wheezing, rales, or rhonchi.   GI: Abdomen soft and distended, bowel sounds present x all 4 quadrants. No tenderness, rebound, or guarding.   NEUROLOGICAL: No focal deficits. Follows commands.  Strength 5/5  in upper and lower extremities. Cranial nerves II-XII grossly intact.  MUSCULOSKELETAL: No joint swelling or tenderness. Moves all extremities.   EXTREMITIES: No gross deformities. No peripheral edema.   SKIN: Grossly warm, dry, and intact. Jaundiced.    Data   Data reviewed today: I reviewed all medications, new labs and imaging results over the last 24 hours.    Recent Labs   Lab 22  1416   WBC 11.7*   HGB 9.7*   MCV 85   *   INR  1.86*   *   POTASSIUM 3.3*   CHLORIDE 94   CO2 27   BUN 9   CR 0.54*   ANIONGAP 10   ARIES 8.1*   *   ALBUMIN 2.8*   PROTTOTAL 8.0   BILITOTAL 20.6*   ALKPHOS 219*   ALT 41   *     Recent Results (from the past 24 hour(s))   US Abdomen Complete w Doppler Complete    Narrative    EXAM: US ABDOMEN COMPLETE WITH DOPPLER COMPLETE  LOCATION: [facility]  DATE/TIME: 8/13/2022 3:58 PM    INDICATION: Acute liver failure.  COMPARISON: Abdominal ultrasound 04/20/2021.  TECHNIQUE: Complete abdominal ultrasound. Color flow with spectral Doppler and waveform analysis performed.     FINDINGS:    GALLBLADDER: Gallbladder is contracted and not well visualized. There may be some gallbladder sludge. No pericholecystic fluid or significant gallbladder wall thickening.    BILE DUCTS: No intrahepatic biliary ductal dilation. Common bile duct is not well visualized.    LIVER: Hepatomegaly with increased parenchymal echogenicity, compatible with diffuse hepatocellular disease. There is an accessory lobe measuring up to 6.3 cm. No focal liver lesions are identified.    RIGHT KIDNEY: Measures 12.5 cm in length. Normal echogenicity with no hydronephrosis or mass.     LEFT KIDNEY: Measures 13.2 cm in length. Normal echogenicity with no hydronephrosis or mass.    SPLEEN: Splenomegaly, measuring 16.7 cm.    PANCREAS: The visualized portions are normal.    AORTA: Normal in caliber.    IVC: Normal where visualized.    Small volume ascites within the right upper quadrant and both lower quadrants.    ABDOMINAL DUPLEX: Reversal of flow within the patent right, left, and main portal veins as well as the splenic vein. Hepatic veins are patent with normal anterograde flow. Hepatic arteries are patent with normal waveforms.      Impression    IMPRESSION:    1.  Hepatomegaly with increased parenchymal echogenicity, compatible with diffuse hepatocellular disease.    2.  Reversal of flow  throughout the portal veins and the splenic vein, without thrombosis. Remainder of the liver Doppler is normal.    3.  Splenomegaly.    4.  Small volume ascites.    5.  Gallbladder is contracted and may contain some sludge. No intrahepatic biliary ductal dilation. Common bile duct is not well visualized.

## 2022-08-13 NOTE — ED PROVIDER NOTES
ED Provider Note  [facility]      History     Chief Complaint   Patient presents with     Alcohol Problem     Seeking detox from alcohol; last drink was Wednesday night; averages 2-4 whiskey drinks per day; denies hx of withdrawal seizures; reports visual hallucinations and insomnia; yellow face; wife reports confusion     HPI  Uriel Soria is a 32 year old male who  has a past medical history of Substance abuse (H).  He presents to the emergency department seeking detox for alcohol abuse.  Patient reports that he usually drinks 1/2-3/4 of a liter of whiskey a day and has been doing this for a long time.  He states that he got a cold a few days ago and then decided to stop drinking on Thursday, 2 days ago.  He reports that he has been feeling shaky and has been having visual hallucinations causing him not to be able to sleep.  He has also noted over the past 3 days that he has begun to look yellow and have dark appearing urine.  He denies fevers, vomiting, blood in his stool or black or tarry stools, abdominal pain, and has no other acute complaints at this time.      Past Medical History  Past Medical History:   Diagnosis Date     Substance abuse (H)      Past Surgical History:   Procedure Laterality Date     BIOPSY       No current outpatient medications on file.    No Known Allergies  Family History  No family history on file.  Social History   Social History     Tobacco Use     Smoking status: Never Smoker     Smokeless tobacco: Former User     Types: Chew   Substance Use Topics     Alcohol use: Yes     Comment: 4 whiskey beverages daily     Drug use: Not Currently      Past medical history, past surgical history, medications, allergies, family history, and social history were reviewed with the patient. No additional pertinent items.       Review of Systems  A complete review of systems was performed with pertinent positives and negatives noted in the HPI, and all other systems  "negative.    Physical Exam   BP: 111/68  Pulse: (!) 121  Temp: 98.9  F (37.2  C)  Resp: 18  Height: 175.3 cm (5' 9\")  Weight: 87.6 kg (193 lb 1.6 oz)  SpO2: 96 %  Physical Exam  Vitals and nursing note reviewed.   Constitutional:       General: He is not in acute distress.     Appearance: He is well-developed. He is ill-appearing and diaphoretic. He is not toxic-appearing.      Comments: Patient is awake and alert, he appears diaphoretic and jaundiced.  Mild tremors noted.  He is mentating normally and protecting his airway without difficulty.   HENT:      Head: Normocephalic and atraumatic.      Mouth/Throat:      Lips: Pink.      Mouth: Mucous membranes are moist.      Pharynx: Oropharynx is clear. No oropharyngeal exudate.   Eyes:      General: Lids are normal. Scleral icterus present.      Extraocular Movements: Extraocular movements intact.      Right eye: No nystagmus.      Left eye: No nystagmus.      Conjunctiva/sclera: Conjunctivae normal.      Pupils: Pupils are equal, round, and reactive to light.   Neck:      Thyroid: No thyromegaly.      Vascular: No JVD.      Trachea: No tracheal deviation.   Cardiovascular:      Rate and Rhythm: Regular rhythm. Tachycardia present.      Pulses: Normal pulses.      Heart sounds: Normal heart sounds. No murmur heard.    No friction rub. No gallop.   Pulmonary:      Effort: Pulmonary effort is normal. No respiratory distress.      Breath sounds: Normal breath sounds.   Abdominal:      General: Bowel sounds are normal. There is distension.      Palpations: Abdomen is soft. There is no mass.      Tenderness: There is no abdominal tenderness. There is no guarding or rebound.   Musculoskeletal:         General: No tenderness. Normal range of motion.      Cervical back: Normal range of motion and neck supple. No erythema or rigidity.      Right lower leg: No edema.      Left lower leg: No edema.   Lymphadenopathy:      Cervical: No cervical adenopathy.   Skin:     General: " Skin is warm.      Capillary Refill: Capillary refill takes 2 to 3 seconds.      Coloration: Skin is jaundiced. Skin is not pale.      Findings: No erythema or rash.   Neurological:      Mental Status: He is alert and oriented to person, place, and time.      Cranial Nerves: No cranial nerve deficit.      Sensory: No sensory deficit.      Motor: Tremor present.   Psychiatric:         Mood and Affect: Mood and affect normal.         Speech: Speech normal.         Behavior: Behavior normal.         ED Course      Procedures                     Results for orders placed or performed during the hospital encounter of 08/13/22   US Abdomen Complete w Doppler Complete     Status: None    Narrative    EXAM: US ABDOMEN COMPLETE WITH DOPPLER COMPLETE  LOCATION: [facility]  DATE/TIME: 8/13/2022 3:58 PM    INDICATION: Acute liver failure.  COMPARISON: Abdominal ultrasound 04/20/2021.  TECHNIQUE: Complete abdominal ultrasound. Color flow with spectral Doppler and waveform analysis performed.     FINDINGS:    GALLBLADDER: Gallbladder is contracted and not well visualized. There may be some gallbladder sludge. No pericholecystic fluid or significant gallbladder wall thickening.    BILE DUCTS: No intrahepatic biliary ductal dilation. Common bile duct is not well visualized.    LIVER: Hepatomegaly with increased parenchymal echogenicity, compatible with diffuse hepatocellular disease. There is an accessory lobe measuring up to 6.3 cm. No focal liver lesions are identified.    RIGHT KIDNEY: Measures 12.5 cm in length. Normal echogenicity with no hydronephrosis or mass.     LEFT KIDNEY: Measures 13.2 cm in length. Normal echogenicity with no hydronephrosis or mass.    SPLEEN: Splenomegaly, measuring 16.7 cm.    PANCREAS: The visualized portions are normal.    AORTA: Normal in caliber.    IVC: Normal where visualized.    Small volume ascites within the right upper quadrant and both lower  quadrants.    ABDOMINAL DUPLEX: Reversal of flow within the patent right, left, and main portal veins as well as the splenic vein. Hepatic veins are patent with normal anterograde flow. Hepatic arteries are patent with normal waveforms.      Impression    IMPRESSION:    1.  Hepatomegaly with increased parenchymal echogenicity, compatible with diffuse hepatocellular disease.    2.  Reversal of flow throughout the portal veins and the splenic vein, without thrombosis. Remainder of the liver Doppler is normal.    3.  Splenomegaly.    4.  Small volume ascites.    5.  Gallbladder is contracted and may contain some sludge. No intrahepatic biliary ductal dilation. Common bile duct is not well visualized.   INR     Status: Abnormal   Result Value Ref Range    INR 1.86 (H) 0.85 - 1.15   Comprehensive metabolic panel     Status: Abnormal   Result Value Ref Range    Sodium 131 (L) 133 - 144 mmol/L    Potassium 3.3 (L) 3.4 - 5.3 mmol/L    Chloride 94 94 - 109 mmol/L    Carbon Dioxide (CO2) 27 20 - 32 mmol/L    Anion Gap 10 3 - 14 mmol/L    Urea Nitrogen 9 7 - 30 mg/dL    Creatinine 0.54 (L) 0.66 - 1.25 mg/dL    Calcium 8.1 (L) 8.5 - 10.1 mg/dL    Glucose 101 (H) 70 - 99 mg/dL    Alkaline Phosphatase 219 (H) 40 - 150 U/L     (H) 0 - 45 U/L    ALT 41 0 - 70 U/L    Protein Total 8.0 6.8 - 8.8 g/dL    Albumin 2.8 (L) 3.4 - 5.0 g/dL    Bilirubin Total 20.6 (HH) 0.2 - 1.3 mg/dL    GFR Estimate >90 >60 mL/min/1.73m2   Lactic acid whole blood     Status: Normal   Result Value Ref Range    Lactic Acid 1.9 0.7 - 2.0 mmol/L   Magnesium     Status: Abnormal   Result Value Ref Range    Magnesium 1.0 (L) 1.6 - 2.3 mg/dL   Drug abuse screen 6 urine (chem dep) ([facility])     Status: Normal   Result Value Ref Range    Amphetamines Urine Screen Negative Screen Negative    Barbiturates Urine Screen Negative Screen Negative    Benzodiazepines Urine Screen Negative Screen Negative    Cannabinoids Urine Screen Negative Screen Negative    Cocaine  Urine Screen Negative Screen Negative    Ethanol Urine Screen Negative Screen Negative    Opiates Urine Screen Negative Screen Negative   Asymptomatic COVID-19 Virus (Coronavirus) by PCR Nasopharyngeal     Status: Normal    Specimen: Nasopharyngeal; Swab   Result Value Ref Range    SARS CoV2 PCR Negative Negative    Narrative    Testing was performed using the loly  SARS-CoV-2 & Influenza A/B Assay on the loly  [place]  System.  This test should be ordered for the detection of SARS-COV-2 in individuals who meet SARS-CoV-2 clinical and/or epidemiological criteria. Test performance is unknown in asymptomatic patients.  This test is for in vitro diagnostic use under the FDA EUA for laboratories certified under CLIA to perform moderate and/or high complexity testing. This test has not been FDA cleared or approved.  A negative test does not rule out the presence of PCR inhibitors in the specimen or target RNA in concentration below the limit of detection for the assay. The possibility of a false negative should be considered if the patient's recent exposure or clinical presentation suggests COVID-19.  [facility] are certified under the Clinical Laboratory Improvement Amendments of 1988 (CLIA-88) as qualified to perform moderate and/or high complexity laboratory testing.   CBC with platelets and differential     Status: Abnormal   Result Value Ref Range    WBC Count 11.7 (H) 4.0 - 11.0 10e3/uL    RBC Count 3.55 (L) 4.40 - 5.90 10e6/uL    Hemoglobin 9.7 (L) 13.3 - 17.7 g/dL    Hematocrit 30.3 (L) 40.0 - 53.0 %    MCV 85 78 - 100 fL    MCH 27.3 26.5 - 33.0 pg    MCHC 32.0 31.5 - 36.5 g/dL    RDW 20.5 (H) 10.0 - 15.0 %    Platelet Count 146 (L) 150 - 450 10e3/uL    % Neutrophils 76 %    % Lymphocytes 10 %    % Monocytes 11 %    % Eosinophils 1 %    % Basophils 1 %    % Immature Granulocytes 1 %    NRBCs per 100 WBC 0 <1 /100    Absolute Neutrophils 9.0 (H) 1.6 - 8.3 10e3/uL    Absolute Lymphocytes 1.2 0.8  - 5.3 10e3/uL    Absolute Monocytes 1.2 0.0 - 1.3 10e3/uL    Absolute Eosinophils 0.1 0.0 - 0.7 10e3/uL    Absolute Basophils 0.1 0.0 - 0.2 10e3/uL    Absolute Immature Granulocytes 0.1 <=0.4 10e3/uL    Absolute NRBCs 0.0 10e3/uL   [place] Draw     Status: None    Narrative    The following orders were created for panel order [place] Draw.  Procedure                               Abnormality         Status                     ---------                               -----------         ------                     Extra Red Top Tube[[id number]]                               Final result                 Please view results for these tests on the individual orders.   Extra Red Top Tube     Status: None   Result Value Ref Range    Hold Specimen [facility]    Adult Type and Screen     Status: None   Result Value Ref Range    ABO/RH(D) O POS     Antibody Screen Negative Negative    SPECIMEN EXPIRATION DATE [id number]    CBC with platelets differential     Status: Abnormal    Narrative    The following orders were created for panel order CBC with platelets differential.  Procedure                               Abnormality         Status                     ---------                               -----------         ------                     CBC with platelets and d...[[id number]]  Abnormal            Final result                 Please view results for these tests on the individual orders.   ABO/Rh type and screen     Status: None    Narrative    The following orders were created for panel order ABO/Rh type and screen.  Procedure                               Abnormality         Status                     ---------                               -----------         ------                     Adult Type and Screen[[id number]]                            Final result                 Please view results for these tests on the individual orders.     Medications   diazepam (VALIUM) tablet 5-20 mg (10 mg Oral Given 8/13/22 4049)    dextrose 5% and 0.45% NaCl 1,000 mL with Infuvite Adult 10 mL, thiamine 100 mg, folic acid 1 mg infusion ( Intravenous Stopped 8/13/22 1545)   diazepam (VALIUM) injection 5 mg (5 mg Intravenous Given 8/13/22 1441)   magnesium sulfate 2 g in water intermittent infusion (0 g Intravenous Stopped 8/13/22 1700)        Assessments & Plan (with Medical Decision Making)     This patient presented to the emergency department seeking detox for alcohol dependence.  Does appear to be going through withdrawal with tachycardia, visual hallucinations, diaphoresis and tremors.  He also appears acutely jaundiced and has findings on lab testing indicative of liver failure with a total bilirubin elevated at 20.6 and elevation of alk phos and transaminases.  INR is elevated indicative of synthetic dysfunction.  Magnesium was low, replacement was started in the emergency department.  Patient was placed on the MSSA protocol and was given a dose of Valium IV, but is holding down fluids and subsequent doses will be oral.  Symptoms and heart rate improved with treatment of withdrawal.  He was given a banana bag.  I feel that he is to ill at this point to admit to a detox unit.  Abdominal ultrasound with Doppler was ordered to evaluate for biliary obstruction, portal hypertension or portal thrombosis.  This demonstrated no evidence of biliary pathology.  There was evidence to suggest portal venous hypertension.  Case was discussed with the triage hospitalist.  Patient will be admitted to the internal medicine service on the [facility].    I have reviewed the nursing notes. I have reviewed the findings, diagnosis, plan and need for follow up with the patient.    New Prescriptions    No medications on file       Final diagnoses:   Alcohol dependence with withdrawal with perceptual disturbance (H)   Acute liver failure without hepatic coma       --  Lucio Bone  [facility] EMERGENCY DEPARTMENT  8/13/2022      Lucio Bone MD  08/13/22 6001

## 2022-08-14 ENCOUNTER — APPOINTMENT (OUTPATIENT)
Dept: GENERAL RADIOLOGY | Facility: CLINIC | Age: 32
DRG: 896 | End: 2022-08-14
Attending: NURSE PRACTITIONER
Payer: COMMERCIAL

## 2022-08-14 ENCOUNTER — APPOINTMENT (OUTPATIENT)
Dept: CARDIOLOGY | Facility: CLINIC | Age: 32
DRG: 896 | End: 2022-08-14
Attending: NURSE PRACTITIONER
Payer: COMMERCIAL

## 2022-08-14 LAB
ALBUMIN SERPL-MCNC: 2.4 G/DL (ref 3.4–5)
ALP SERPL-CCNC: 187 U/L (ref 40–150)
ALT SERPL W P-5'-P-CCNC: 37 U/L (ref 0–70)
AMMONIA PLAS-SCNC: <10 UMOL/L (ref 10–50)
ANION GAP SERPL CALCULATED.3IONS-SCNC: 8 MMOL/L (ref 3–14)
APAP SERPL-MCNC: <5 UG/ML
AST SERPL W P-5'-P-CCNC: 183 U/L (ref 0–45)
BILIRUB SERPL-MCNC: 18.7 MG/DL (ref 0.2–1.3)
BUN SERPL-MCNC: 9 MG/DL (ref 7–30)
C PNEUM DNA SPEC QL NAA+PROBE: NOT DETECTED
CA-I BLD-MCNC: 4.2 MG/DL (ref 4.4–5.2)
CALCIUM SERPL-MCNC: 7.7 MG/DL (ref 8.5–10.1)
CHLORIDE BLD-SCNC: 99 MMOL/L (ref 94–109)
CO2 SERPL-SCNC: 26 MMOL/L (ref 20–32)
CREAT SERPL-MCNC: 0.59 MG/DL (ref 0.66–1.25)
ERYTHROCYTE [DISTWIDTH] IN BLOOD BY AUTOMATED COUNT: 20.7 % (ref 10–15)
FLUAV H1 2009 PAND RNA SPEC QL NAA+PROBE: NOT DETECTED
FLUAV H1 RNA SPEC QL NAA+PROBE: NOT DETECTED
FLUAV H3 RNA SPEC QL NAA+PROBE: NOT DETECTED
FLUAV RNA SPEC QL NAA+PROBE: NOT DETECTED
FLUBV RNA SPEC QL NAA+PROBE: NOT DETECTED
FOLATE SERPL-MCNC: 6.3 NG/ML (ref 4.6–34.8)
GFR SERPL CREATININE-BSD FRML MDRD: >90 ML/MIN/1.73M2
GLUCOSE BLD-MCNC: 96 MG/DL (ref 70–99)
GLUCOSE BLDC GLUCOMTR-MCNC: 88 MG/DL (ref 70–99)
HADV DNA SPEC QL NAA+PROBE: NOT DETECTED
HCOV PNL SPEC NAA+PROBE: NOT DETECTED
HCT VFR BLD AUTO: 26.6 % (ref 40–53)
HGB BLD-MCNC: 8.5 G/DL (ref 13.3–17.7)
HMPV RNA SPEC QL NAA+PROBE: NOT DETECTED
HPIV1 RNA SPEC QL NAA+PROBE: NOT DETECTED
HPIV2 RNA SPEC QL NAA+PROBE: NOT DETECTED
HPIV3 RNA SPEC QL NAA+PROBE: NOT DETECTED
HPIV4 RNA SPEC QL NAA+PROBE: NOT DETECTED
INR PPP: 1.97 (ref 0.85–1.15)
LACTATE SERPL-SCNC: 1.5 MMOL/L (ref 0.7–2)
LIPASE SERPL-CCNC: 243 U/L (ref 73–393)
LVEF ECHO: NORMAL
M PNEUMO DNA SPEC QL NAA+PROBE: NOT DETECTED
MAGNESIUM SERPL-MCNC: 2.1 MG/DL (ref 1.6–2.3)
MCH RBC QN AUTO: 27.6 PG (ref 26.5–33)
MCHC RBC AUTO-ENTMCNC: 32 G/DL (ref 31.5–36.5)
MCV RBC AUTO: 86 FL (ref 78–100)
PHOSPHATE SERPL-MCNC: 1.8 MG/DL (ref 2.5–4.5)
PHOSPHATE SERPL-MCNC: 3.8 MG/DL (ref 2.5–4.5)
PLATELET # BLD AUTO: 124 10E3/UL (ref 150–450)
POTASSIUM BLD-SCNC: 3.1 MMOL/L (ref 3.4–5.3)
POTASSIUM BLD-SCNC: 3.2 MMOL/L (ref 3.4–5.3)
PROT SERPL-MCNC: 6.9 G/DL (ref 6.8–8.8)
RBC # BLD AUTO: 3.08 10E6/UL (ref 4.4–5.9)
RSV RNA SPEC QL NAA+PROBE: NOT DETECTED
RSV RNA SPEC QL NAA+PROBE: NOT DETECTED
RV+EV RNA SPEC QL NAA+PROBE: DETECTED
SODIUM SERPL-SCNC: 133 MMOL/L (ref 133–144)
TRANSFERRIN SERPL-MCNC: 225 MG/DL (ref 200–360)
TSH SERPL DL<=0.005 MIU/L-ACNC: 3.15 MU/L (ref 0.4–4)
VIT B12 SERPL-MCNC: 618 PG/ML (ref 232–1245)
WBC # BLD AUTO: 9.4 10E3/UL (ref 4–11)

## 2022-08-14 PROCEDURE — 96376 TX/PRO/DX INJ SAME DRUG ADON: CPT | Performed by: EMERGENCY MEDICINE

## 2022-08-14 PROCEDURE — 255N000002 HC RX 255 OP 636: Performed by: INTERNAL MEDICINE

## 2022-08-14 PROCEDURE — 96375 TX/PRO/DX INJ NEW DRUG ADDON: CPT | Performed by: EMERGENCY MEDICINE

## 2022-08-14 PROCEDURE — 250N000013 HC RX MED GY IP 250 OP 250 PS 637: Performed by: NURSE PRACTITIONER

## 2022-08-14 PROCEDURE — 96366 THER/PROPH/DIAG IV INF ADDON: CPT | Performed by: EMERGENCY MEDICINE

## 2022-08-14 PROCEDURE — 82330 ASSAY OF CALCIUM: CPT | Performed by: NURSE PRACTITIONER

## 2022-08-14 PROCEDURE — 250N000011 HC RX IP 250 OP 636: Performed by: NURSE PRACTITIONER

## 2022-08-14 PROCEDURE — 82140 ASSAY OF AMMONIA: CPT | Performed by: NURSE PRACTITIONER

## 2022-08-14 PROCEDURE — C9113 INJ PANTOPRAZOLE SODIUM, VIA: HCPCS | Performed by: NURSE PRACTITIONER

## 2022-08-14 PROCEDURE — 250N000009 HC RX 250: Performed by: NURSE PRACTITIONER

## 2022-08-14 PROCEDURE — 200N000002 HC R&B ICU UMMC

## 2022-08-14 PROCEDURE — 96368 THER/DIAG CONCURRENT INF: CPT | Performed by: EMERGENCY MEDICINE

## 2022-08-14 PROCEDURE — 83690 ASSAY OF LIPASE: CPT | Performed by: NURSE PRACTITIONER

## 2022-08-14 PROCEDURE — 36415 COLL VENOUS BLD VENIPUNCTURE: CPT | Performed by: NURSE PRACTITIONER

## 2022-08-14 PROCEDURE — 93005 ELECTROCARDIOGRAM TRACING: CPT

## 2022-08-14 PROCEDURE — 71045 X-RAY EXAM CHEST 1 VIEW: CPT | Mod: 26 | Performed by: RADIOLOGY

## 2022-08-14 PROCEDURE — 87633 RESP VIRUS 12-25 TARGETS: CPT | Performed by: NURSE PRACTITIONER

## 2022-08-14 PROCEDURE — 85610 PROTHROMBIN TIME: CPT | Performed by: NURSE PRACTITIONER

## 2022-08-14 PROCEDURE — 71045 X-RAY EXAM CHEST 1 VIEW: CPT

## 2022-08-14 PROCEDURE — 84100 ASSAY OF PHOSPHORUS: CPT | Performed by: NURSE PRACTITIONER

## 2022-08-14 PROCEDURE — 84132 ASSAY OF SERUM POTASSIUM: CPT | Performed by: NURSE PRACTITIONER

## 2022-08-14 PROCEDURE — 83605 ASSAY OF LACTIC ACID: CPT | Performed by: NURSE PRACTITIONER

## 2022-08-14 PROCEDURE — 258N000003 HC RX IP 258 OP 636: Performed by: NURSE PRACTITIONER

## 2022-08-14 PROCEDURE — 82435 ASSAY OF BLOOD CHLORIDE: CPT | Performed by: NURSE PRACTITIONER

## 2022-08-14 PROCEDURE — 99291 CRITICAL CARE FIRST HOUR: CPT | Performed by: NURSE PRACTITIONER

## 2022-08-14 PROCEDURE — 85014 HEMATOCRIT: CPT | Performed by: NURSE PRACTITIONER

## 2022-08-14 PROCEDURE — 250N000011 HC RX IP 250 OP 636: Performed by: STUDENT IN AN ORGANIZED HEALTH CARE EDUCATION/TRAINING PROGRAM

## 2022-08-14 PROCEDURE — 83735 ASSAY OF MAGNESIUM: CPT | Performed by: NURSE PRACTITIONER

## 2022-08-14 PROCEDURE — 93306 TTE W/DOPPLER COMPLETE: CPT | Mod: 26 | Performed by: INTERNAL MEDICINE

## 2022-08-14 PROCEDURE — 250N000011 HC RX IP 250 OP 636: Performed by: PHYSICIAN ASSISTANT

## 2022-08-14 PROCEDURE — 84443 ASSAY THYROID STIM HORMONE: CPT | Performed by: NURSE PRACTITIONER

## 2022-08-14 RX ORDER — ONDANSETRON 4 MG/1
4 TABLET, ORALLY DISINTEGRATING ORAL EVERY 6 HOURS PRN
Status: DISCONTINUED | OUTPATIENT
Start: 2022-08-14 | End: 2022-08-19 | Stop reason: HOSPADM

## 2022-08-14 RX ORDER — DEXTROSE MONOHYDRATE 25 G/50ML
25-50 INJECTION, SOLUTION INTRAVENOUS
Status: DISCONTINUED | OUTPATIENT
Start: 2022-08-14 | End: 2022-08-19 | Stop reason: HOSPADM

## 2022-08-14 RX ORDER — LORAZEPAM 2 MG/ML
2 INJECTION INTRAMUSCULAR ONCE
Status: COMPLETED | OUTPATIENT
Start: 2022-08-14 | End: 2022-08-14

## 2022-08-14 RX ORDER — MAGNESIUM SULFATE HEPTAHYDRATE 40 MG/ML
2 INJECTION, SOLUTION INTRAVENOUS ONCE
Status: COMPLETED | OUTPATIENT
Start: 2022-08-14 | End: 2022-08-14

## 2022-08-14 RX ORDER — MULTIPLE VITAMINS W/ MINERALS TAB 9MG-400MCG
1 TAB ORAL DAILY
Status: DISCONTINUED | OUTPATIENT
Start: 2022-08-14 | End: 2022-08-14

## 2022-08-14 RX ORDER — GABAPENTIN 300 MG/1
900 CAPSULE ORAL EVERY 8 HOURS
Status: DISCONTINUED | OUTPATIENT
Start: 2022-08-14 | End: 2022-08-15

## 2022-08-14 RX ORDER — HALOPERIDOL 5 MG/ML
2.5-5 INJECTION INTRAMUSCULAR EVERY 4 HOURS PRN
Status: DISCONTINUED | OUTPATIENT
Start: 2022-08-14 | End: 2022-08-14

## 2022-08-14 RX ORDER — POTASSIUM CHLORIDE 7.45 MG/ML
10 INJECTION INTRAVENOUS
Status: COMPLETED | OUTPATIENT
Start: 2022-08-14 | End: 2022-08-14

## 2022-08-14 RX ORDER — SODIUM CHLORIDE, SODIUM LACTATE, POTASSIUM CHLORIDE, CALCIUM CHLORIDE 600; 310; 30; 20 MG/100ML; MG/100ML; MG/100ML; MG/100ML
INJECTION, SOLUTION INTRAVENOUS CONTINUOUS
Status: DISCONTINUED | OUTPATIENT
Start: 2022-08-14 | End: 2022-08-15

## 2022-08-14 RX ORDER — CLONIDINE HYDROCHLORIDE 0.1 MG/1
0.1 TABLET ORAL
Status: DISCONTINUED | OUTPATIENT
Start: 2022-08-14 | End: 2022-08-14

## 2022-08-14 RX ORDER — FOLIC ACID 5 MG/ML
1 INJECTION, SOLUTION INTRAMUSCULAR; INTRAVENOUS; SUBCUTANEOUS ONCE
Status: COMPLETED | OUTPATIENT
Start: 2022-08-14 | End: 2022-08-14

## 2022-08-14 RX ORDER — FOLIC ACID 5 MG/ML
1 INJECTION, SOLUTION INTRAMUSCULAR; INTRAVENOUS; SUBCUTANEOUS DAILY
Status: DISCONTINUED | OUTPATIENT
Start: 2022-08-15 | End: 2022-08-15

## 2022-08-14 RX ORDER — ONDANSETRON 2 MG/ML
4 INJECTION INTRAMUSCULAR; INTRAVENOUS EVERY 6 HOURS PRN
Status: DISCONTINUED | OUTPATIENT
Start: 2022-08-14 | End: 2022-08-19 | Stop reason: HOSPADM

## 2022-08-14 RX ORDER — POLYETHYLENE GLYCOL 3350 17 G/17G
17 POWDER, FOR SOLUTION ORAL DAILY PRN
Status: DISCONTINUED | OUTPATIENT
Start: 2022-08-14 | End: 2022-08-15

## 2022-08-14 RX ORDER — GABAPENTIN 300 MG/1
300 CAPSULE ORAL EVERY 8 HOURS
Status: DISCONTINUED | OUTPATIENT
Start: 2022-08-19 | End: 2022-08-14

## 2022-08-14 RX ORDER — NICOTINE POLACRILEX 4 MG
15-30 LOZENGE BUCCAL
Status: DISCONTINUED | OUTPATIENT
Start: 2022-08-14 | End: 2022-08-19 | Stop reason: HOSPADM

## 2022-08-14 RX ORDER — FOLIC ACID 1 MG/1
1 TABLET ORAL DAILY
Status: DISCONTINUED | OUTPATIENT
Start: 2022-08-17 | End: 2022-08-15

## 2022-08-14 RX ORDER — LORAZEPAM 2 MG/ML
1-2 INJECTION INTRAMUSCULAR EVERY 30 MIN PRN
Status: DISCONTINUED | OUTPATIENT
Start: 2022-08-14 | End: 2022-08-15

## 2022-08-14 RX ORDER — GABAPENTIN 100 MG/1
100 CAPSULE ORAL EVERY 8 HOURS
Status: DISCONTINUED | OUTPATIENT
Start: 2022-08-21 | End: 2022-08-15

## 2022-08-14 RX ORDER — GABAPENTIN 300 MG/1
300 CAPSULE ORAL EVERY 8 HOURS
Status: DISCONTINUED | OUTPATIENT
Start: 2022-08-19 | End: 2022-08-15

## 2022-08-14 RX ORDER — GABAPENTIN 300 MG/1
900 CAPSULE ORAL EVERY 8 HOURS
Status: DISCONTINUED | OUTPATIENT
Start: 2022-08-14 | End: 2022-08-14

## 2022-08-14 RX ORDER — LORAZEPAM 1 MG/1
1-2 TABLET ORAL EVERY 30 MIN PRN
Status: DISCONTINUED | OUTPATIENT
Start: 2022-08-14 | End: 2022-08-15

## 2022-08-14 RX ORDER — NICOTINE POLACRILEX 4 MG
15-30 LOZENGE BUCCAL
Status: DISCONTINUED | OUTPATIENT
Start: 2022-08-14 | End: 2022-08-14

## 2022-08-14 RX ORDER — DEXTROSE MONOHYDRATE 25 G/50ML
25-50 INJECTION, SOLUTION INTRAVENOUS
Status: DISCONTINUED | OUTPATIENT
Start: 2022-08-14 | End: 2022-08-14

## 2022-08-14 RX ORDER — PANTOPRAZOLE SODIUM 40 MG/1
40 TABLET, DELAYED RELEASE ORAL
Status: DISCONTINUED | OUTPATIENT
Start: 2022-08-15 | End: 2022-08-15

## 2022-08-14 RX ORDER — GABAPENTIN 300 MG/1
600 CAPSULE ORAL EVERY 8 HOURS
Status: DISCONTINUED | OUTPATIENT
Start: 2022-08-17 | End: 2022-08-14

## 2022-08-14 RX ORDER — GABAPENTIN 300 MG/1
600 CAPSULE ORAL EVERY 8 HOURS
Status: DISCONTINUED | OUTPATIENT
Start: 2022-08-17 | End: 2022-08-15

## 2022-08-14 RX ORDER — LACTULOSE 10 G/10G
10 SOLUTION ORAL 2 TIMES DAILY
Status: DISCONTINUED | OUTPATIENT
Start: 2022-08-14 | End: 2022-08-14

## 2022-08-14 RX ORDER — GABAPENTIN 100 MG/1
100 CAPSULE ORAL EVERY 8 HOURS
Status: DISCONTINUED | OUTPATIENT
Start: 2022-08-21 | End: 2022-08-14

## 2022-08-14 RX ORDER — CALCIUM GLUCONATE 20 MG/ML
2 INJECTION, SOLUTION INTRAVENOUS ONCE
Status: COMPLETED | OUTPATIENT
Start: 2022-08-14 | End: 2022-08-15

## 2022-08-14 RX ORDER — CLONIDINE HYDROCHLORIDE 0.1 MG/1
0.1 TABLET ORAL EVERY 8 HOURS
Status: DISCONTINUED | OUTPATIENT
Start: 2022-08-14 | End: 2022-08-15

## 2022-08-14 RX ORDER — BISACODYL 10 MG
10 SUPPOSITORY, RECTAL RECTAL DAILY PRN
Status: DISCONTINUED | OUTPATIENT
Start: 2022-08-14 | End: 2022-08-15

## 2022-08-14 RX ORDER — GABAPENTIN 600 MG/1
1200 TABLET ORAL ONCE
Status: DISCONTINUED | OUTPATIENT
Start: 2022-08-14 | End: 2022-08-14

## 2022-08-14 RX ORDER — FLUMAZENIL 0.1 MG/ML
0.2 INJECTION, SOLUTION INTRAVENOUS
Status: DISCONTINUED | OUTPATIENT
Start: 2022-08-14 | End: 2022-08-15

## 2022-08-14 RX ORDER — METOPROLOL TARTRATE 1 MG/ML
5 INJECTION, SOLUTION INTRAVENOUS EVERY 6 HOURS PRN
Status: DISCONTINUED | OUTPATIENT
Start: 2022-08-14 | End: 2022-08-15

## 2022-08-14 RX ORDER — PROCHLORPERAZINE 25 MG
25 SUPPOSITORY, RECTAL RECTAL EVERY 12 HOURS PRN
Status: DISCONTINUED | OUTPATIENT
Start: 2022-08-14 | End: 2022-08-19 | Stop reason: HOSPADM

## 2022-08-14 RX ORDER — CALCIUM GLUCONATE 20 MG/ML
1 INJECTION, SOLUTION INTRAVENOUS ONCE
Status: COMPLETED | OUTPATIENT
Start: 2022-08-14 | End: 2022-08-14

## 2022-08-14 RX ORDER — PROCHLORPERAZINE MALEATE 10 MG
10 TABLET ORAL EVERY 6 HOURS PRN
Status: DISCONTINUED | OUTPATIENT
Start: 2022-08-14 | End: 2022-08-19 | Stop reason: HOSPADM

## 2022-08-14 RX ADMIN — POTASSIUM CHLORIDE 10 MEQ: 7.46 INJECTION, SOLUTION INTRAVENOUS at 08:10

## 2022-08-14 RX ADMIN — THIAMINE HYDROCHLORIDE 200 MG: 100 INJECTION, SOLUTION INTRAMUSCULAR; INTRAVENOUS at 08:11

## 2022-08-14 RX ADMIN — SODIUM CHLORIDE, POTASSIUM CHLORIDE, SODIUM LACTATE AND CALCIUM CHLORIDE: 600; 310; 30; 20 INJECTION, SOLUTION INTRAVENOUS at 17:06

## 2022-08-14 RX ADMIN — PANTOPRAZOLE SODIUM 40 MG: 40 INJECTION, POWDER, FOR SOLUTION INTRAVENOUS at 08:11

## 2022-08-14 RX ADMIN — CALCIUM GLUCONATE 2 G: 20 INJECTION, SOLUTION INTRAVENOUS at 23:05

## 2022-08-14 RX ADMIN — SODIUM CHLORIDE, POTASSIUM CHLORIDE, SODIUM LACTATE AND CALCIUM CHLORIDE 500 ML: 600; 310; 30; 20 INJECTION, SOLUTION INTRAVENOUS at 23:06

## 2022-08-14 RX ADMIN — SODIUM PHOSPHATE, MONOBASIC, MONOHYDRATE AND SODIUM PHOSPHATE, DIBASIC, ANHYDROUS 9 MMOL: 276; 142 INJECTION, SOLUTION INTRAVENOUS at 06:12

## 2022-08-14 RX ADMIN — POTASSIUM CHLORIDE 10 MEQ: 7.46 INJECTION, SOLUTION INTRAVENOUS at 05:13

## 2022-08-14 RX ADMIN — POTASSIUM CHLORIDE 10 MEQ: 7.46 INJECTION, SOLUTION INTRAVENOUS at 06:03

## 2022-08-14 RX ADMIN — HUMAN ALBUMIN MICROSPHERES AND PERFLUTREN 6 ML: 10; .22 INJECTION, SOLUTION INTRAVENOUS at 13:13

## 2022-08-14 RX ADMIN — LORAZEPAM 1 MG: 2 INJECTION INTRAMUSCULAR; INTRAVENOUS at 00:33

## 2022-08-14 RX ADMIN — GABAPENTIN 900 MG: 300 CAPSULE ORAL at 17:07

## 2022-08-14 RX ADMIN — THIAMINE HYDROCHLORIDE 200 MG: 100 INJECTION, SOLUTION INTRAMUSCULAR; INTRAVENOUS at 14:11

## 2022-08-14 RX ADMIN — THIAMINE HYDROCHLORIDE 200 MG: 100 INJECTION, SOLUTION INTRAMUSCULAR; INTRAVENOUS at 20:19

## 2022-08-14 RX ADMIN — Medication 5 MG: at 20:48

## 2022-08-14 RX ADMIN — POTASSIUM CHLORIDE 10 MEQ: 7.46 INJECTION, SOLUTION INTRAVENOUS at 07:07

## 2022-08-14 RX ADMIN — SODIUM PHOSPHATE, MONOBASIC, MONOHYDRATE AND SODIUM PHOSPHATE, DIBASIC, ANHYDROUS 9 MMOL: 276; 142 INJECTION, SOLUTION INTRAVENOUS at 08:11

## 2022-08-14 RX ADMIN — FOLIC ACID 1 MG: 5 INJECTION, SOLUTION INTRAMUSCULAR; INTRAVENOUS; SUBCUTANEOUS at 04:41

## 2022-08-14 RX ADMIN — HALOPERIDOL LACTATE 5 MG: 5 INJECTION, SOLUTION INTRAMUSCULAR at 01:00

## 2022-08-14 RX ADMIN — SODIUM CHLORIDE, POTASSIUM CHLORIDE, SODIUM LACTATE AND CALCIUM CHLORIDE: 600; 310; 30; 20 INJECTION, SOLUTION INTRAVENOUS at 21:55

## 2022-08-14 RX ADMIN — SODIUM CHLORIDE, POTASSIUM CHLORIDE, SODIUM LACTATE AND CALCIUM CHLORIDE: 600; 310; 30; 20 INJECTION, SOLUTION INTRAVENOUS at 05:12

## 2022-08-14 RX ADMIN — MAGNESIUM SULFATE 2 G: 2 INJECTION INTRAVENOUS at 08:09

## 2022-08-14 RX ADMIN — LORAZEPAM 2 MG: 2 INJECTION INTRAMUSCULAR; INTRAVENOUS at 00:32

## 2022-08-14 RX ADMIN — CALCIUM GLUCONATE 1 G: 20 INJECTION, SOLUTION INTRAVENOUS at 06:18

## 2022-08-14 RX ADMIN — SODIUM CHLORIDE, POTASSIUM CHLORIDE, SODIUM LACTATE AND CALCIUM CHLORIDE 1000 ML: 600; 310; 30; 20 INJECTION, SOLUTION INTRAVENOUS at 20:17

## 2022-08-14 RX ADMIN — MULTIPLE VITAMINS W/ MINERALS TAB 1 TABLET: TAB at 08:11

## 2022-08-14 RX ADMIN — DEXMEDETOMIDINE HYDROCHLORIDE 0.5 MCG/KG/HR: 100 INJECTION, SOLUTION INTRAVENOUS at 14:48

## 2022-08-14 RX ADMIN — LORAZEPAM 2 MG: 2 INJECTION INTRAMUSCULAR; INTRAVENOUS at 02:27

## 2022-08-14 RX ADMIN — LORAZEPAM 1 MG: 2 INJECTION INTRAMUSCULAR at 08:58

## 2022-08-14 RX ADMIN — DEXMEDETOMIDINE HYDROCHLORIDE 0.2 MCG/KG/HR: 100 INJECTION, SOLUTION INTRAVENOUS at 04:25

## 2022-08-14 RX ADMIN — MAGNESIUM SULFATE HEPTAHYDRATE 2 G: 40 INJECTION, SOLUTION INTRAVENOUS at 02:30

## 2022-08-14 ASSESSMENT — ACTIVITIES OF DAILY LIVING (ADL)
ADLS_ACUITY_SCORE: 45
ADLS_ACUITY_SCORE: 45
ADLS_ACUITY_SCORE: 35
ADLS_ACUITY_SCORE: 45
ADLS_ACUITY_SCORE: 47
ADLS_ACUITY_SCORE: 35
ADLS_ACUITY_SCORE: 45
ADLS_ACUITY_SCORE: 45
ADLS_ACUITY_SCORE: 35
ADLS_ACUITY_SCORE: 45

## 2022-08-14 NOTE — ED NOTES
Patient continues to be very agitated and not able to reason or redirect. Patient trying to climb out of bed. 2nd IV pulled out. RN contacted hospitalist for update and recommendation. IV ativan given. 1:1 remains with patient.

## 2022-08-14 NOTE — CONSULTS
HEPATOLOGY CONSULTATION      Date of Admission:  8/13/2022          ASSESSMENT AND RECOMMENDATIONS:     Uriel Soria is a 32 year old male with history of alcohol use disorder who presented in acute alcohol withdrawal, found to have elevated LFTs consistent with alcohol induced hepatitis.      # Alcohol related hepatitis   # Small volume ascites  Patient presented in severe alcohol withdrawal and found to have labs consistent with alcohol induced hepatitis. Required transfer to ICU for agitation management with precedex. No evidence of infection or GI blood loss. Pt with history of alcohol related hepatitis and had liver biopsy in 2021 showing III of IV fibrosis. May have underlying cirrhosis but difficult to determine in setting of acute hepatitis. Noted to be positive for Rhinovirus, no respiratory symptoms. Maddrey score 61.5; will hold off on steroids at this time while completing infectious workup. Will continue to monitor and recommend supportive cares.     RECOMMENDATIONS  - Continue supportive cares for alcohol withdrawal   - diagnostic paracentesis   - Trend MELD labs daily   - Agree with nutrition consult to optimize nutrition status when acute withdrawal period has improved  - thiamine and folate supplementation   - No role for steroids at this point while ruling out infections; will continue to assess      Hepatology team will continue to follow.     Thank you for involving us in this patient's care. Please do not hesitate to contact the GI service with any questions or concerns.     Patient care plan discussed with Dr. Almazan, GI staff physician.    Cam Alves MD  Gastroenterology Fellow - PGY4  [facility]   Page Me              Chief Complaint:   We were asked by Dr. Mohit Hernandez to evaluate this patient with suspected EtOH hepatitis.   History obtained by chart review as patient in ICU on precedex drip, unable to participate in video visit.          History of Present  Illness:   Uriel Soria is a 32 year old male with history of alcohol use disorder who presented in acute alcohol withdrawal. Last drink was Wednesday night per his wife.  On admission patient noted he had got a cold a few days ago and decided to stop drinking.  Prior to that he had been drinking 1/2 to 3/4 L of whiskey a day.  Symptoms of visual hallucinations and tremors.  Also had noticed his urine was dark for the past few days.  On admission he had no complaints of fever, chills, nausea or vomiting, blood in his stool, melena or abdominal pain.  His withdrawals worsened overnight and he was transferred to the ICU for Precedex drip.  This morning had ongoing hallucinations.  Patient on [email or web address] and unable to participate in video visit.      On admission he was found to have bilirubin of 20.6, , ALT 41.  Normal kidney function and electrolytes.  WBC 11.7, hemoglobin 9.7, platelets 140 INR of 8.6.  His last labs for comparison were about a year ago and at that time had hemoglobin of 13, INR 1.28, bilirubin of 6.  It appears he was seen by Faith last year for elevated LFTs and abnormal liver imaging, thought to be alcoholic hepatitis.  MRI showed evidence of fibrosis and sequelae of portal hypertension (splenomegaly and trace ascites).  He underwent liver biopsy around this time and had stage 3 of 4 fibrosis on liver biopsy.          Past Medical History:   EtOH use disorder  Gout         Past Surgical History:   Reviewed and edited as appropriate   Past Surgical History:   Procedure Laterality Date     BIOPSY              Social History:   Obtained by chart review. Last drink on 8/10 per wife. Drinks 0.5-0.75 L of whiskey daily.          Family History:   Unable to obtain due to encephalopathy .  Chart review notes patient with Crohn's disease       Allergies:   Reviewed and edited as appropriate   No Known Allergies         Review of Systems:     A complete review of systems was performed and is  "negative except as noted in the HPI           Physical Exam:   /57   Pulse 99   Temp 98.5  F (36.9  C) (Oral)   Resp 25   Ht 1.753 m (5' 9\")   Wt 86.5 kg (190 lb 12.8 oz)   SpO2 92%   BMI 28.18 kg/m    Wt:   Wt Readings from Last 2 Encounters:   08/14/22 86.5 kg (190 lb 12.8 oz)      Exam not done; pt unable to participate in video visit due to encephalopathy          Data:   Labs and imaging below were independently reviewed and interpreted      BMP  Recent Labs   Lab 08/14/22  0430 08/14/22 0414 08/13/22  1416   NA  --  133 131*   POTASSIUM  --  3.2* 3.3*   CHLORIDE  --  99 94   ARIES  --  7.7* 8.1*   CO2  --  26 27   BUN  --  9 9   CR  --  0.59* 0.54*   GLC 88 96 101*     CBC  Recent Labs   Lab 08/14/22 0414 08/13/22 2003 08/13/22  1416   WBC 9.4 9.4 11.7*   RBC 3.08* 3.19* 3.55*   HGB 8.5* 8.9* 9.7*   HCT 26.6* 27.7* 30.3*   MCV 86 87 85   MCH 27.6 27.9 27.3   MCHC 32.0 32.1 32.0   RDW 20.7* 20.8* 20.5*   * 123* 146*     INR  Recent Labs   Lab 08/14/22 0414 08/13/22  1416   INR 1.97* 1.86*     LFTs  Recent Labs   Lab 08/14/22 0414 08/13/22  1416   ALKPHOS 187* 219*   * 217*   ALT 37 41   BILITOTAL 18.7* 20.6*   PROTTOTAL 6.9 8.0   ALBUMIN 2.4* 2.8*      PANC  Recent Labs   Lab 08/14/22  0414   LIPASE 243       Imaging:  US   1.  Hepatomegaly with increased parenchymal echogenicity, compatible with diffuse hepatocellular disease.     2.  Reversal of flow throughout the portal veins and the splenic vein, without thrombosis. Remainder of the liver Doppler is normal.     3.  Splenomegaly.     4.  Small volume ascites.     5.  Gallbladder is contracted and may contain some sludge. No intrahepatic biliary ductal dilation. Common bile duct is not well visualized.  "

## 2022-08-14 NOTE — H&P
[facility] ADULT ICU H&P  08/14/2022    Date of Hospital Admission: 8/13/22  Date of ICU Admission: 8/14/22   Reason for Critical Care Admission: Severe agitation 2/2 ETOH withdrawal requiring IV benzodiazepines. Initially admitted to medicine and boarded in ED, never went to the floor and experienced clinical decompensation necessitating ICU admission.   Date of Service (when I saw the patient): 08/14/2022    ASSESSMENT: Uriel Soria is a 32 year old male with PMH of gout, ETOH abuse, cirrhosis. He was admitted 8/13/22 to medicine for severe ETOH withdrawal and alcoholic hepatitis. While waiting in ED for a medicine bed to open up he clinically decompensated and became extremely agitated requiring IV benzodiazepines. He was upgraded to ICU level of care for IV BZDs and a possible precedex gtt.        PLAN:    Neuro:  # Severe ETOH withdrawal complicated with agitation and visual hallucinations   # Alcohol use disorder   Drinks whiskey 0.5-0.75L/day. Quit drinking cold turkey Thursday 8/11. Presented with tremors and visual hallucinations which deteriorated to agitation and violent behaviors directed at staff. He was upgraded to ICU level of care at that time for IV benzos and possibly a precedex gtt if his agitation is refractory to standard care.       [facility] protocol     Lorazepam given hepatic dysfunction    Gabapentin taper    Clonidine 0.1 mg q8h    Haloperidol for hallucinations - EKG for QTc verification on arrival    Melatonin 5 mg @ HS     Precedex gtt if above ineffective    Thiamine/folate    Seizure precautions    1:1 sitter while agitated     Will need to discuss sobriety and CD treatment with patient once his agitation improves, holding off on placing consult at this time     Pulmonary:  No acute issues     Supplemental O2 to keep SpO2 >  92%    CXR to rule out acute infiltrate     Cardiovascular:  # Cardiac murmur, best heard at R sternal border   # Sinus tachycardia     BP goals: MAP > 65 mmHg,  BP < 160/110     Fluids: 1L NS & 1L banana bag in ED, no ongoing fluids at this time     EKG on ICU arrival    TTE ordered for this AM to evaluate new murmur     Continuous cardiac monitor    Optimize electrolytes     GI/Nutrition:  # Decompensated ETOH cirrhosis with small volume ascites, hypoalbuminemia, and coagulation defect (MELD-Na+ 28 on admission)  # Hyperbilirubinemia with elevations in ALP and AST   On admit, Tbili 20.6, , ALT 41, . INR 1.86. Creatinine 0.54. WBC 11.7. RUQ US w Doppler 8/13/22 revealed hepatomegaly with diffuse parenchymal echogenicity, reversal of flow throughout protal veins and splenic vein without thrombosis, splenomegaly, small volume ascites. Liver biopsy 6/21/21 (steatohepatitis) and MR Abd w/wo contrast 5/4/21 (see care everywhere). In June 2021, INR 1.28 and LFTs with total bili 5.9, , ALT 84, Alk Phos 111. Reports he has never had an EGD. No current concern for acute GI bleed.      GI hepatology consult (formal consult AM 8/14), appreciate recs:     Pan culture to rule out infection    Diagnostic paracentesis  - IR consult placed for AM     Thiamine/folate supps     Hep A/B/C and HIV in process     Repeat tylenol level (unresuled initially)    Lactulose 10 g BID     # Risk for malnutrition   # Obesity, complicates cares     Nutrition consult, appreciate recs    Diet: NPO given mental status, will need NG tube if unable to take po in coming days       Renal/Fluids/Electrolytes:  # High risk for refeeding syndrome   # Mild asymptomatic hyponatremia  # Hypokalemia  # Hypomagnesemia  # Hypophosphatemia     Repeat CMP with mag, phos, and ionized calcium on ICU arrival    High intensity ICU electrolyte replacement protocols     Strict I&O    Daily weights     Endocrine:  # Risk for hypoglycemia    Check TSH with T4 reflex     Hypoglycemia order set in place     ID:  # Leukocytosis with concern for active infection   WBC 11.7 on arrival without neutrophilia on  differential. WBCs normalized after fluid resuscitation, drop in all cell lines so could have simply been 2/2 dehydration and plasma volume contraction. However, infection still a concern given elevated CRP and procal. Had complaints of a cold a few days ago. COVID negative on arrival.     Procal 0.66 and CRP 57, recheck AM 8/15    Check CXR given complaints of recent cold     Abd US not concerning for cholecystitis/ascending cholangitis     Cultures:    8/13 Blood - NGTD    8/13 UC - NGTD     8/14 RVP given report of recent cold     Paracentesis ordered per IR 8/14    Deferring abx at this time given clinical stability and lack of infectious findings on exam      Hematology:    # Thrombocytopenia likely in the setting of cirrhosis   # Normocytic anemia   # Coagulation defect, elevated INR in the setting of cirrhosis   Platelets 146 on admission; previously normal in 2021. Hemoglobin 9.7 at admission; was 12.9 ~1 year ago. LDH only in the 300's. Smear and haptoglobin remain pending to rule out hemolysis, but his thrombocytopenia and anemia are likely 2/2 cirrhosis.     Peripheral smear & hatpoglobin in process    Daily CBC and INR     Holding DVT chemo ppx while awaiting IR paracentesis, SCDs for now     Musculoskeletal:  No acute concerns     Ambulatory PTA, defer PT/OT at this time    Low threshold to consult if pt requires any significant time in bed to manage agitation    Skin:  No acute concerns    Diligent skin cares to prevent breakdown    General Cares/Prophylaxis:    DVT Prophylaxis: Pneumatic Compression Devices  GI Prophylaxis: PPI  Restraints: Not indicated   Family Communication: No. Will update Wife Danica during the daytime.   Code Status: Full     Lines/tubes/drains:    PIV x2     Disposition:    [facility] Adult ICU    Stephy Salcido NP  Critical care time 48 minutes       -----------------------------------------------------------------------    HISTORY PRESENTING ILLNESS:           Uriel LOZANO  Estella is a 32 year old male with PMH of gout, ETOH abuse, cirrhosis. He was admitted 8/13/22 to medicine for severe ETOH withdrawal and alcoholic hepatitis.          Patient reports drinking ~0.5-0.75L whiskey/day. No known seizure events. His last drink was thursday evening 8/11/22 and since that time has noticed his skin looks yellow and urine appears dark. Patient reports tremors and visual hallucinations since he stopped drinking etoh. On further investigation, it appears these were significant hallucinations involving monsters who were hovering around his bed, talking in his living room and creeping in the shadows and around corners when they thought he was not looking last night. He saw children come through his bedroom window. He saw a man with a disfigured face run into his son's room. On arrival to ED, he saw multiple people (who his wife confirmed were not truly there) during different instances. No fevers, chills, chest pain/pressure, shortness of breath, nausea, vomiting, abdominal pain, dysuria reported.           Labs in ED notable for electrolyte derangements (Na+ 131, K+ 3.3< mag 1.0, phos 1.7), normal renal function, deranged hepatic panel with low albumin and elevated INR, CRP 57, procal 0.66, . Initial CBC showed a leukocytosis which normalized on the recheck along with normocytic anemia and thrombocytopenia. UA showed bacteria, but no leuk esterase or nitrates, sent for culture along with blood. COVID negative. RUQ US revealed hepatomegaly with diffuse parenchymal echogenicity, reversal of flow throughout protal veins and splenic vein without thrombosis, splenomegaly, small volume ascites. GI evaluated him and saw no need for emergent transfer to [facility] given lack of concern for active GIB. He was resuscitated with 2L crystalloid (banana bag), ETOH withdrawal symptoms treated with ativan, and mag given. While waiting for a medicine bed to open up he clinically decompensated and  became extremely agitated requiring IV benzodiazepines. He was upgraded to ICU level of care for IV BZDs and a possible precedex gtt.      REVIEW OF SYSTEMS: Unable d/t mental status     PAST MEDICAL HISTORY:   Past Medical History:   Diagnosis Date     Substance abuse (H)      SURGICAL HISTORY:  Past Surgical History:   Procedure Laterality Date     BIOPSY       SOCIAL HISTORY:  Social History     Socioeconomic History     Marital status:      Spouse name: None     Number of children: None     Years of education: None     Highest education level: None   Tobacco Use     Smoking status: Never Smoker     Smokeless tobacco: Former User     Types: Chew   Substance and Sexual Activity     Alcohol use: Yes     Comment: 4 whiskey beverages daily     Drug use: Not Currently     FAMILY HISTORY:   No family history on file.  ALLERGIES:   No Known Allergies  MEDICATIONS:  No current facility-administered medications on file prior to encounter.  No current outpatient medications on file prior to encounter.      PHYSICAL EXAMINATION:  Temp:  [98.4  F (36.9  C)-98.9  F (37.2  C)] 98.4  F (36.9  C)  Pulse:  [107-125] 124  Resp:  [16-36] 26  BP: (111-136)/(68-85) 121/72  SpO2:  [93 %-98 %] 93 %  General: Sleeping in bed upon my arrival, tremulous when awoke, in mild distress r/t withdrawal symptoms  HEENT: Normocephalic, atraumatic, icteric sclera, oral mucosa moist, neck supple  Neuro: Oriented to self, pupils 2 mm PERRLA, motor exam grossly nonfocal  Pulm/Resp: Clear breath sounds bilaterally without rhonchi, crackles or wheeze, breathing non-labored  CV: RRR, systolic murmur heard best at the R sternal position  Abdomen: Soft, distended, non-tender to light or deep palpation, BS active  : normal external male genitalia  Extremities: warm and well perfused, no edema   Incisions/Skin: diffusely jaundiced, no lesions, rashes, or wounds     LABS: Reviewed.   Arterial Blood Gases   No lab results found in last 7  days.  Complete Blood Count   Recent Labs   Lab 08/13/22 2003 08/13/22  1416   WBC 9.4 11.7*   HGB 8.9* 9.7*   * 146*     Basic Metabolic Panel  Recent Labs   Lab 08/13/22  1416   *   POTASSIUM 3.3*   CHLORIDE 94   CO2 27   BUN 9   CR 0.54*   *     Liver Function Tests  Recent Labs   Lab 08/13/22  1416   *   ALT 41   ALKPHOS 219*   BILITOTAL 20.6*   ALBUMIN 2.8*   INR 1.86*     Coagulation Profile  Recent Labs   Lab 08/13/22  1416   INR 1.86*       IMAGING:  Recent Results (from the past 24 hour(s))   US Abdomen Complete w Doppler Complete    Narrative    EXAM: US ABDOMEN COMPLETE WITH DOPPLER COMPLETE  LOCATION: [facility]  DATE/TIME: 8/13/2022 3:58 PM    INDICATION: Acute liver failure.  COMPARISON: Abdominal ultrasound 04/20/2021.  TECHNIQUE: Complete abdominal ultrasound. Color flow with spectral Doppler and waveform analysis performed.     FINDINGS:    GALLBLADDER: Gallbladder is contracted and not well visualized. There may be some gallbladder sludge. No pericholecystic fluid or significant gallbladder wall thickening.    BILE DUCTS: No intrahepatic biliary ductal dilation. Common bile duct is not well visualized.    LIVER: Hepatomegaly with increased parenchymal echogenicity, compatible with diffuse hepatocellular disease. There is an accessory lobe measuring up to 6.3 cm. No focal liver lesions are identified.    RIGHT KIDNEY: Measures 12.5 cm in length. Normal echogenicity with no hydronephrosis or mass.     LEFT KIDNEY: Measures 13.2 cm in length. Normal echogenicity with no hydronephrosis or mass.    SPLEEN: Splenomegaly, measuring 16.7 cm.    PANCREAS: The visualized portions are normal.    AORTA: Normal in caliber.    IVC: Normal where visualized.    Small volume ascites within the right upper quadrant and both lower quadrants.    ABDOMINAL DUPLEX: Reversal of flow within the patent right, left, and main portal veins as well as  the splenic vein. Hepatic veins are patent with normal anterograde flow. Hepatic arteries are patent with normal waveforms.      Impression    IMPRESSION:    1.  Hepatomegaly with increased parenchymal echogenicity, compatible with diffuse hepatocellular disease.    2.  Reversal of flow throughout the portal veins and the splenic vein, without thrombosis. Remainder of the liver Doppler is normal.    3.  Splenomegaly.    4.  Small volume ascites.    5.  Gallbladder is contracted and may contain some sludge. No intrahepatic biliary ductal dilation. Common bile duct is not well visualized.

## 2022-08-14 NOTE — PROGRESS NOTES
"CLINICAL NUTRITION SERVICES - ASSESSMENT NOTE     Nutrition Prescription    RECOMMENDATIONS FOR MDs/PROVIDERS TO ORDER:  - ADAT as medically able     Malnutrition Status:    Unable to determine     Recommendations already ordered by Registered Dietitian (RD):  None at this time  Tube Feeding Recommendations noted below if/when needed.      Future/Additional Recommendations:  -Monitor diet advancement/NPO status   - Weight, labs and plan of care      REASON FOR ASSESSMENT  Uriel Soria is a/an 32 year old male assessed by the dietitian for Provider Order - Registered Dietitian to Assess and Order TF per Medical Nutrition Therapy Protocol \"malnutrition assessment and supplementation recs - Thanks!\"     NUTRITION HISTORY  32 year old male with PMH of gout, ETOH abuse, cirrhosis. He was admitted 8/13/22 to medicine for severe ETOH withdrawal and alcoholic hepatitis. While waiting in ED for a medicine bed to open up he clinically decompensated and became extremely agitated requiring IV benzodiazepines. He was upgraded to ICU level of care for IV BZDs and a possible precedex gtt.    -  NPO given mental status, will need NG tube if unable to take po in coming days   - High risk for refeeding syndrome: on replacement protocols       CURRENT NUTRITION ORDERS  Diet: NPO  Intake/Tolerance: Unable to assess po intake PTA d/t mental status     LABS  Recent Labs   Lab Test 08/14/22  0430 08/14/22  0414 08/13/22  1416   NA  --  133 131*   POTASSIUM  --  3.2* 3.3*   CHLORIDE  --  99 94   CO2  --  26 27   ANIONGAP  --  8 10   GLC 88 96 101*   BUN  --  9 9   CR  --  0.59* 0.54*   ARIES  --  7.7* 8.1*   Mg 2.1  Phos 1.8   T bili 18.7     MEDICATIONS  Mg/K/phos replacement, Multivitamin w/minerals, Thiamine, Precedex, LR at 75mL/hr, others reviewed     ANTHROPOMETRICS  Height: 175.3 cm (5' 9\")  Most Recent Weight: 86.5 kg (190 lb 12.8 oz)    IBW: 73 kg  BMI: Overweight BMI 25-29.9  Weight History:   Wt Readings from Last 10 " Encounters:   08/14/22 86.5 kg (190 lb 12.8 oz)       Dosing Weight: 76 kg adjusted BW    ASSESSED NUTRITION NEEDS  Estimated Energy Needs: 6272-7633 kcals/day (25 - 30 kcals/kg)  Justification: Maintenance  Estimated Protein Needs: 76-91 grams protein/day (1 - 1.2 grams of pro/kg)  Justification: Maintenance  Estimated Fluid Needs: 4162-9210 mL/day (1 mL/kcal)   Justification: Maintenance and Per provider pending fluid status    PHYSICAL FINDINGS  Unable to determine due to RD off-site and unable to conduct NFPA\    MALNUTRITION  % Intake: Unable to assess  % Weight Loss: Unable to assess  Subcutaneous Fat Loss: Unable to assess  Muscle Loss: Unable to assess  Fluid Accumulation/Edema: Unable to assess  Malnutrition Diagnosis: Unable to determine due to limited information     NUTRITION DIAGNOSIS  Inadequate oral intake related to diet order as evidenced by NPO status       INTERVENTIONS  Implementation  Nutrition Education: No education needs assessed at this time     - ADAT as medically able     - Medical food supplement therapy: once diet advanced able to send appropriate supplements      Enteral Nutrition: if unable to advance diet in the next 2-3 days, consider starting enteral nutrition support:     Prior to feeds starting:  -Obtain and verify enteral access.  -Obtain baseline K+, Mag and Phos labs and ensure they are WNL.   -Patient is hemodynamically stable.     Once the above are met, recommend the following TF regimen as below:    1) Jevity 1.5 via NG at 20 mL/hr x 6 hrs. If K+ and Mag are WNL and Phos >/= 2.0, advance by 10 m24L per hr q 6 to goal of 60 mL/hr x 24 hrs.   -Do not advance if K+ and Mag are below normal and Phos <2.0. Hold at current rate and replace electrolytes. When K+ and Mag are WNL and Phos  >/= 2.0 may advance as above. At goal rate, formula provides total volume of 1440 mL, 2160 kcal (28 kcal/kg, 100% needs), 92 gm Pro (1.2 gm/kg, 100% needs), 311 gm CHO, 72 gm fat, 32 gm fiber, and  1094 mL free water.      2) Free water flushes of 60 mL q 4 hrs. Total fluid (free water + flushes + TF) = 1454 mL per day. MD to adjust IVF if/when TF initiated.     3) HOB should be elevated at least 30-40 degrees when supine.    -Recommend MD decrease or discontinue IVF as TF advances to prevent overhydration.  -RD to adjust free water flushes pending IVF and per MD recommendation pending fluid status.    Goals  Diet adv v nutrition support within 2-3 days.     Monitoring/Evaluation  Progress toward goals will be monitored and evaluated per protocol.    Flor Turcios RD, LD   Clinical Dietitian   On Call Weekend Pager: [phone]

## 2022-08-14 NOTE — ED NOTES
"Patient actively trying to leave to \"go get pajamas from across the street\". RN in to give medications. Resting in bed at this time.   "

## 2022-08-14 NOTE — INTERIM SUMMARY
"[facility]  Transfer Triage Note    Date of call: 08/13/22  Time of call: 8:34 PM    Current Patient Location: [facility] ED   Current Level of Care: ED    Vitals: Temp: 98.4  F (36.9  C) Temp src: Oral BP: 116/74 Pulse: 115   Resp: 16 SpO2: 96 % Height: 175.3 cm (5' 9\") Weight: 87.6 kg (193 lb 1.6 oz)    at    Diagnosis: Acute alc hep, alcohol withdrawal  Is COVID-19 a concern? No  Reason for requested transfer: Further diagnostic work up, management, and consultation for specialized care   Isolation Needs: None    Outside Records: Available  Additional records may be faxed to [phone].    Transfer accepted: Yes  Stability of Patient: Patient is vitally stable, with no critical labs, and will likely remain stable throughout the transfer process  Level of Care Needed: Med Surg  Telemetry Needed:  None  Expected Time of Arrival for Transfer: 8-24 hours  Arrival Location:  [facility]    Recommendations for Management and Stabilization: Not needed    Additional Comments:     32 year old male w/ history of alcohol use disorder coming into the hospital for help detoxing from alcohol. He was found to have an elevated bilirubin of 20 and elevated INR suggestive of alc hep versus decompensated cirrhosis or both. He has no fever, abdominal pain or signs of hepatic encephalopathy concerning for acute liver failure necessitating emergent transfer. Barnes cultured, will need paracentesis to check ascities for SBP, thiamine/folate. If unable to make it to [facility] will be able to do a video visit on the [facility].    Vazquez Hernandez, DO      "

## 2022-08-14 NOTE — PLAN OF CARE
ICU End of Shift Summary. See flowsheets for vital signs and detailed assessment.    Changes this shift: Admitted from ED for alcohol withdrawal, precedex infusion started, electrolyte replacement protocols started. VSS on RA. Skin assessment completed, Jaundiced, bruised. Oriented to self, visual hallucinations, seeing and reaching for spiders in room that are not present.     Plan: Paracentesis, echo, EKG    Goal Outcome Evaluation: In process

## 2022-08-14 NOTE — ED NOTES
Patient getting aggressively confused, continues to swing at this writer when trying to redirect patient. Patient pulled out 2nd IV. 2 staff members continue to try to keep patient in bed. Patient's bed placed in trendelenburg to try to deter patient from getting up.

## 2022-08-14 NOTE — ED NOTES
Patient becoming increasingly confused and agitated. Unable to reason with patient or redirect. RN walked into room and patient had pulled his IV out and was out of bed, stating he was trying to play basketball with his friend. 1:1 put with patient.

## 2022-08-14 NOTE — PROGRESS NOTES
Brief GI note:   Called by triage hospitalist regarding patient on [facility] and question if patient should be transferred to [facility]. Pt presented for assistance with alcohol withdrawal symptoms. Found to have bilirubin of 20, alt 41, ast 217, and INR 1.86. Hgb 9.7. No concern for acute GI bleed on admission. Per triage hospitalist patient alert and oriented.     No acute need for transfer to [place] from GI/hepatology standpoint. This could change if patient has acute GI bleed or other clinical deterioration.     Initial recommendations:   - diagnostic paracentesis  - pan culture to rule out infection   - thiamine and folate supplementation       Formal consult to follow tomorrow.     Discussed with on call hepatologist, Dr. Almazan.     Cam Alves MD  Gastroenterology Fellow - PGY4  [facility]

## 2022-08-14 NOTE — ED NOTES
Patient started to amp up again and attempted to climb out of bed. Patient had one leg on the side rail when more staff entered the room. Multiple staff tried to redirect patient with no success. Patient also urinated on self so staff cleaned him up and got him to lay back in bed. RN gave more meds, patiently currently talking to self.

## 2022-08-15 PROBLEM — N17.9 ACUTE KIDNEY FAILURE, UNSPECIFIED (H): Status: ACTIVE | Noted: 2022-08-15

## 2022-08-15 LAB
ALBUMIN SERPL-MCNC: 2 G/DL (ref 3.4–5)
ALBUMIN SERPL-MCNC: 2.2 G/DL (ref 3.4–5)
ALP SERPL-CCNC: 166 U/L (ref 40–150)
ALP SERPL-CCNC: 180 U/L (ref 40–150)
ALT SERPL W P-5'-P-CCNC: 36 U/L (ref 0–70)
ALT SERPL W P-5'-P-CCNC: 37 U/L (ref 0–70)
ANION GAP SERPL CALCULATED.3IONS-SCNC: 10 MMOL/L (ref 3–14)
ANION GAP SERPL CALCULATED.3IONS-SCNC: 7 MMOL/L (ref 3–14)
ANION GAP SERPL CALCULATED.3IONS-SCNC: 8 MMOL/L (ref 3–14)
APTT PPP: 40 SECONDS (ref 22–38)
AST SERPL W P-5'-P-CCNC: 169 U/L (ref 0–45)
AST SERPL W P-5'-P-CCNC: 179 U/L (ref 0–45)
BACTERIA UR CULT: NO GROWTH
BILIRUB DIRECT SERPL-MCNC: 15.3 MG/DL (ref 0–0.2)
BILIRUB DIRECT SERPL-MCNC: 18.7 MG/DL (ref 0–0.2)
BILIRUB SERPL-MCNC: 20.3 MG/DL (ref 0.2–1.3)
BILIRUB SERPL-MCNC: 22.7 MG/DL (ref 0.2–1.3)
BUN SERPL-MCNC: 18 MG/DL (ref 7–30)
BUN SERPL-MCNC: 19 MG/DL (ref 7–30)
BUN SERPL-MCNC: 20 MG/DL (ref 7–30)
CALCIUM SERPL-MCNC: 8.1 MG/DL (ref 8.5–10.1)
CALCIUM SERPL-MCNC: 8.1 MG/DL (ref 8.5–10.1)
CALCIUM SERPL-MCNC: 8.8 MG/DL (ref 8.5–10.1)
CHLORIDE BLD-SCNC: 99 MMOL/L (ref 94–109)
CO2 SERPL-SCNC: 23 MMOL/L (ref 20–32)
CO2 SERPL-SCNC: 24 MMOL/L (ref 20–32)
CO2 SERPL-SCNC: 25 MMOL/L (ref 20–32)
CREAT SERPL-MCNC: 1.28 MG/DL (ref 0.66–1.25)
CREAT SERPL-MCNC: 1.31 MG/DL (ref 0.66–1.25)
CREAT SERPL-MCNC: 1.34 MG/DL (ref 0.66–1.25)
CREAT UR-MCNC: 154 MG/DL
CRP SERPL-MCNC: 65 MG/L (ref 0–8)
ERYTHROCYTE [DISTWIDTH] IN BLOOD BY AUTOMATED COUNT: 21.2 % (ref 10–15)
FRACT EXCRET NA UR+SERPL-RTO: 0 %
GFR SERPL CREATININE-BSD FRML MDRD: 72 ML/MIN/1.73M2
GFR SERPL CREATININE-BSD FRML MDRD: 74 ML/MIN/1.73M2
GFR SERPL CREATININE-BSD FRML MDRD: 76 ML/MIN/1.73M2
GLUCOSE BLD-MCNC: 88 MG/DL (ref 70–99)
GLUCOSE BLD-MCNC: 89 MG/DL (ref 70–99)
GLUCOSE BLD-MCNC: 95 MG/DL (ref 70–99)
GLUCOSE BLDC GLUCOMTR-MCNC: 119 MG/DL (ref 70–99)
GLUCOSE BLDC GLUCOMTR-MCNC: 227 MG/DL (ref 70–99)
GLUCOSE BLDC GLUCOMTR-MCNC: 80 MG/DL (ref 70–99)
HAPTOGLOB SERPL-MCNC: 57 MG/DL (ref 32–197)
HAV IGG SER QL IA: NONREACTIVE
HAV IGM SERPL QL IA: NONREACTIVE
HBV SURFACE AB SERPL IA-ACNC: 0 M[IU]/ML
HBV SURFACE AG SERPL QL IA: NONREACTIVE
HCT VFR BLD AUTO: 27.8 % (ref 40–53)
HCV AB SERPL QL IA: NONREACTIVE
HGB BLD-MCNC: 8.7 G/DL (ref 13.3–17.7)
HIV 1+2 AB+HIV1 P24 AG SERPL QL IA: NONREACTIVE
INR PPP: 1.84 (ref 0.85–1.15)
MAGNESIUM SERPL-MCNC: 2.3 MG/DL (ref 1.6–2.3)
MCH RBC QN AUTO: 28 PG (ref 26.5–33)
MCHC RBC AUTO-ENTMCNC: 31.3 G/DL (ref 31.5–36.5)
MCV RBC AUTO: 89 FL (ref 78–100)
PATH REPORT.COMMENTS IMP SPEC: NORMAL
PATH REPORT.COMMENTS IMP SPEC: NORMAL
PATH REPORT.FINAL DX SPEC: NORMAL
PATH REPORT.MICROSCOPIC SPEC OTHER STN: NORMAL
PATH REPORT.MICROSCOPIC SPEC OTHER STN: NORMAL
PATH REPORT.RELEVANT HX SPEC: NORMAL
PHOSPHATE SERPL-MCNC: 3.4 MG/DL (ref 2.5–4.5)
PLATELET # BLD AUTO: 148 10E3/UL (ref 150–450)
POTASSIUM BLD-SCNC: 3.1 MMOL/L (ref 3.4–5.3)
POTASSIUM BLD-SCNC: 3.4 MMOL/L (ref 3.4–5.3)
POTASSIUM BLD-SCNC: 3.5 MMOL/L (ref 3.4–5.3)
POTASSIUM BLD-SCNC: 3.5 MMOL/L (ref 3.4–5.3)
PROCALCITONIN SERPL-MCNC: 0.87 NG/ML
PROT SERPL-MCNC: 6.2 G/DL (ref 6.8–8.8)
PROT SERPL-MCNC: 6.9 G/DL (ref 6.8–8.8)
RBC # BLD AUTO: 3.11 10E6/UL (ref 4.4–5.9)
SODIUM SERPL-SCNC: 131 MMOL/L (ref 133–144)
SODIUM SERPL-SCNC: 131 MMOL/L (ref 133–144)
SODIUM SERPL-SCNC: 132 MMOL/L (ref 133–144)
SODIUM UR-SCNC: 6 MMOL/L
WBC # BLD AUTO: 8.5 10E3/UL (ref 4–11)

## 2022-08-15 PROCEDURE — 84132 ASSAY OF SERUM POTASSIUM: CPT

## 2022-08-15 PROCEDURE — 250N000013 HC RX MED GY IP 250 OP 250 PS 637: Performed by: NURSE PRACTITIONER

## 2022-08-15 PROCEDURE — 84100 ASSAY OF PHOSPHORUS: CPT | Performed by: NURSE PRACTITIONER

## 2022-08-15 PROCEDURE — 86140 C-REACTIVE PROTEIN: CPT | Performed by: NURSE PRACTITIONER

## 2022-08-15 PROCEDURE — 85027 COMPLETE CBC AUTOMATED: CPT | Performed by: NURSE PRACTITIONER

## 2022-08-15 PROCEDURE — 85610 PROTHROMBIN TIME: CPT | Performed by: NURSE PRACTITIONER

## 2022-08-15 PROCEDURE — 85060 BLOOD SMEAR INTERPRETATION: CPT | Performed by: PATHOLOGY

## 2022-08-15 PROCEDURE — P9047 ALBUMIN (HUMAN), 25%, 50ML: HCPCS

## 2022-08-15 PROCEDURE — 99233 SBSQ HOSP IP/OBS HIGH 50: CPT

## 2022-08-15 PROCEDURE — 84300 ASSAY OF URINE SODIUM: CPT

## 2022-08-15 PROCEDURE — 84450 TRANSFERASE (AST) (SGOT): CPT

## 2022-08-15 PROCEDURE — 85730 THROMBOPLASTIN TIME PARTIAL: CPT | Performed by: NURSE PRACTITIONER

## 2022-08-15 PROCEDURE — 250N000011 HC RX IP 250 OP 636: Performed by: NURSE PRACTITIONER

## 2022-08-15 PROCEDURE — 99232 SBSQ HOSP IP/OBS MODERATE 35: CPT | Mod: 25 | Performed by: PSYCHIATRY & NEUROLOGY

## 2022-08-15 PROCEDURE — 250N000013 HC RX MED GY IP 250 OP 250 PS 637: Performed by: INTERNAL MEDICINE

## 2022-08-15 PROCEDURE — 84145 PROCALCITONIN (PCT): CPT | Performed by: NURSE PRACTITIONER

## 2022-08-15 PROCEDURE — 82310 ASSAY OF CALCIUM: CPT | Performed by: NURSE PRACTITIONER

## 2022-08-15 PROCEDURE — 82374 ASSAY BLOOD CARBON DIOXIDE: CPT

## 2022-08-15 PROCEDURE — 250N000013 HC RX MED GY IP 250 OP 250 PS 637: Performed by: PSYCHIATRY & NEUROLOGY

## 2022-08-15 PROCEDURE — 999N000216 HC STATISTIC ADULT CD FACE TO FACE-NO CHRG

## 2022-08-15 PROCEDURE — 250N000011 HC RX IP 250 OP 636

## 2022-08-15 PROCEDURE — 84132 ASSAY OF SERUM POTASSIUM: CPT | Performed by: INTERNAL MEDICINE

## 2022-08-15 PROCEDURE — 83735 ASSAY OF MAGNESIUM: CPT | Performed by: NURSE PRACTITIONER

## 2022-08-15 PROCEDURE — 36415 COLL VENOUS BLD VENIPUNCTURE: CPT

## 2022-08-15 PROCEDURE — 82248 BILIRUBIN DIRECT: CPT

## 2022-08-15 PROCEDURE — 36415 COLL VENOUS BLD VENIPUNCTURE: CPT | Performed by: NURSE PRACTITIONER

## 2022-08-15 PROCEDURE — 84155 ASSAY OF PROTEIN SERUM: CPT

## 2022-08-15 PROCEDURE — 82248 BILIRUBIN DIRECT: CPT | Performed by: NURSE PRACTITIONER

## 2022-08-15 PROCEDURE — 258N000003 HC RX IP 258 OP 636: Performed by: NURSE PRACTITIONER

## 2022-08-15 PROCEDURE — 200N000002 HC R&B ICU UMMC

## 2022-08-15 RX ORDER — POLYETHYLENE GLYCOL 3350 17 G/17G
17 POWDER, FOR SOLUTION ORAL 2 TIMES DAILY
Status: DISCONTINUED | OUTPATIENT
Start: 2022-08-15 | End: 2022-08-19 | Stop reason: HOSPADM

## 2022-08-15 RX ORDER — ACETAMINOPHEN 325 MG/1
325 TABLET ORAL EVERY 8 HOURS PRN
Status: DISCONTINUED | OUTPATIENT
Start: 2022-08-15 | End: 2022-08-19 | Stop reason: HOSPADM

## 2022-08-15 RX ORDER — GABAPENTIN 300 MG/1
600 CAPSULE ORAL 3 TIMES DAILY
Status: COMPLETED | OUTPATIENT
Start: 2022-08-15 | End: 2022-08-16

## 2022-08-15 RX ORDER — ALBUMIN (HUMAN) 12.5 G/50ML
75 SOLUTION INTRAVENOUS ONCE
Status: COMPLETED | OUTPATIENT
Start: 2022-08-15 | End: 2022-08-15

## 2022-08-15 RX ORDER — FOLIC ACID 1 MG/1
1 TABLET ORAL DAILY
Status: DISCONTINUED | OUTPATIENT
Start: 2022-08-16 | End: 2022-08-19 | Stop reason: HOSPADM

## 2022-08-15 RX ORDER — AMOXICILLIN 250 MG
1-2 CAPSULE ORAL 2 TIMES DAILY
Status: DISCONTINUED | OUTPATIENT
Start: 2022-08-15 | End: 2022-08-19 | Stop reason: HOSPADM

## 2022-08-15 RX ORDER — GABAPENTIN 300 MG/1
300 CAPSULE ORAL 3 TIMES DAILY
Status: DISCONTINUED | OUTPATIENT
Start: 2022-08-15 | End: 2022-08-15

## 2022-08-15 RX ORDER — GABAPENTIN 300 MG/1
300 CAPSULE ORAL 3 TIMES DAILY
Status: DISCONTINUED | OUTPATIENT
Start: 2022-08-17 | End: 2022-08-19 | Stop reason: HOSPADM

## 2022-08-15 RX ORDER — MIRTAZAPINE 7.5 MG/1
15 TABLET, FILM COATED ORAL AT BEDTIME
Status: DISCONTINUED | OUTPATIENT
Start: 2022-08-15 | End: 2022-08-15

## 2022-08-15 RX ORDER — POTASSIUM CHLORIDE 1.5 G/1.58G
40 POWDER, FOR SOLUTION ORAL ONCE
Status: COMPLETED | OUTPATIENT
Start: 2022-08-15 | End: 2022-08-15

## 2022-08-15 RX ORDER — MIRTAZAPINE 15 MG/1
15 TABLET, FILM COATED ORAL AT BEDTIME
Status: DISCONTINUED | OUTPATIENT
Start: 2022-08-15 | End: 2022-08-19 | Stop reason: HOSPADM

## 2022-08-15 RX ORDER — BISACODYL 10 MG
10 SUPPOSITORY, RECTAL RECTAL EVERY OTHER DAY
Status: DISCONTINUED | OUTPATIENT
Start: 2022-08-16 | End: 2022-08-19 | Stop reason: HOSPADM

## 2022-08-15 RX ADMIN — GABAPENTIN 600 MG: 300 CAPSULE ORAL at 21:25

## 2022-08-15 RX ADMIN — THIAMINE HCL TAB 100 MG 100 MG: 100 TAB at 21:25

## 2022-08-15 RX ADMIN — FOLIC ACID 1 MG: 5 INJECTION, SOLUTION INTRAMUSCULAR; INTRAVENOUS; SUBCUTANEOUS at 08:11

## 2022-08-15 RX ADMIN — GABAPENTIN 900 MG: 300 CAPSULE ORAL at 08:03

## 2022-08-15 RX ADMIN — THIAMINE HCL TAB 100 MG 100 MG: 100 TAB at 14:15

## 2022-08-15 RX ADMIN — MIRTAZAPINE 15 MG: 7.5 TABLET, FILM COATED ORAL at 21:25

## 2022-08-15 RX ADMIN — POTASSIUM CHLORIDE 40 MEQ: 1.5 POWDER, FOR SOLUTION ORAL at 08:04

## 2022-08-15 RX ADMIN — ALBUMIN HUMAN 75 G: 0.25 SOLUTION INTRAVENOUS at 16:37

## 2022-08-15 RX ADMIN — SODIUM CHLORIDE, POTASSIUM CHLORIDE, SODIUM LACTATE AND CALCIUM CHLORIDE 1000 ML: 600; 310; 30; 20 INJECTION, SOLUTION INTRAVENOUS at 19:42

## 2022-08-15 RX ADMIN — THIAMINE HYDROCHLORIDE 200 MG: 100 INJECTION, SOLUTION INTRAMUSCULAR; INTRAVENOUS at 08:11

## 2022-08-15 RX ADMIN — MULTIPLE VITAMINS W/ MINERALS TAB 1 TABLET: TAB at 08:04

## 2022-08-15 RX ADMIN — Medication 5 MG: at 22:33

## 2022-08-15 RX ADMIN — PANTOPRAZOLE SODIUM 40 MG: 40 TABLET, DELAYED RELEASE ORAL at 08:04

## 2022-08-15 RX ADMIN — GABAPENTIN 900 MG: 300 CAPSULE ORAL at 02:42

## 2022-08-15 ASSESSMENT — ACTIVITIES OF DAILY LIVING (ADL)
ADLS_ACUITY_SCORE: 26
ADLS_ACUITY_SCORE: 47
ADLS_ACUITY_SCORE: 43
WALKING_OR_CLIMBING_STAIRS_DIFFICULTY: NO
CHANGE_IN_FUNCTIONAL_STATUS_SINCE_ONSET_OF_CURRENT_ILLNESS/INJURY: NO
ADLS_ACUITY_SCORE: 26
DOING_ERRANDS_INDEPENDENTLY_DIFFICULTY: NO
ADLS_ACUITY_SCORE: 43
ADLS_ACUITY_SCORE: 26
DRESSING/BATHING_DIFFICULTY: NO
FALL_HISTORY_WITHIN_LAST_SIX_MONTHS: NO
DIFFICULTY_EATING/SWALLOWING: NO
TOILETING_ISSUES: NO
ADLS_ACUITY_SCORE: 43
WEAR_GLASSES_OR_BLIND: NO
ADLS_ACUITY_SCORE: 26
ADLS_ACUITY_SCORE: 26
ADLS_ACUITY_SCORE: 47
ADLS_ACUITY_SCORE: 47
ADLS_ACUITY_SCORE: 26
CONCENTRATING,_REMEMBERING_OR_MAKING_DECISIONS_DIFFICULTY: NO

## 2022-08-15 NOTE — PLAN OF CARE
"Neuro: A&Ox3-4 and progressively more alert with lucid conversation, asking questions about plan of care; agreeable. Short-term and long-term memory now intact with the exception of 8/14 events. Precedex gtt weaned to OFF at 0305. CIWA scores 3,3,3 without PRN Rx given. Mild tremor and slightly diaphoretic with first assessment; pt denies all other s/sx of withdrawal. PERRA. Denies pain but \"feels bloated and has for years.\"   CV: Tmax 99.1, BP's initially soft with 1000ml LR bolus and additional 500ml bolus given. Edema: hips/sacrum/pedal +1/+2. NSR with murmur auscultated  Pulm: 90-91% on RA, 1L NC maintained O2 saturations >92%. Mild, intermittent, non-productive cough. Bilateral lung fields clear/dim throughout to auscultation. Shallow breaths.  GI: abdomen distended and patient/s only current discomfort/complaint is \"feeling bloated.\" Tolerated 50% of dinner, 2 applesauce overnight, water and ginger ale. Bowel tones auscultated and passing gas. No BM overnight.  : Continent voids to urinal; dark diamond/tea-colored urine.   MS: [employer] and able to assist with bed mobility. Skin jaundice in color (generlized). Rash-like appearance to chest, shoulders. CHG bath provided.   LDA: patent L and R PIV's    Goal Outcome Evaluation: Improving    Lesa Parmar RN      "

## 2022-08-15 NOTE — CONSULTS
"Patient is a 32 year old male admitted with acute alcohol withdrawal and agitation. Patient's team requesting diagnostic paracentesis due to concern for SBP.     Abdominal ultrasound (8/13/22) demonstrates trace to small volume ascites. Per chart, patient had leukocytosis on arrival (11.7) which is now resolved (8.5). Per H&P, \"Deferring abx at this time given clinical stability and lack of infectious findings on exam,\" with abdominal exam documented as \"Soft, distended, non-tender to light or deep palpation, BS active.\"    Diagnostic paracentesis deferred at this time, due to trace/small volume ascites and low suspicion for infection.    Case discussed with primary team.    Lv Mccoy PA-C  Interventional Radiology  [phone] pgr.    "

## 2022-08-15 NOTE — CONSULTS
PSYCHIATRIC CONSULTATION    Requesting Physician: Richi Rausch NP    Admission Date: 08/14/2022  Date of Service: 08/15/2022    The patient was seen along with his wife, his chart reviewed, his case discussed with staff, report to follow.    DX: Alcohol Use Disorder         Alcohol Withdrawal Syndrome with delirium, resolving         Generalized Anxiety Disorder         Panic Attacks by history    Plan: Strongly recommend Chemical Dependency treatment and AA meetings. I will start him on scheduled Gabapentin 300 mg TID and Remeron 15 mg at bedtime to address his anxiety. One of the selective serotonin reuptake inhibitor may also be considered but I am hesitant to prescribe it now because of existing sexual dysfunction.  Upon discharge the patient should be referred to a psychiatrist for outpatient follow-up.    Thanks,    Devaughn Harrell MD

## 2022-08-15 NOTE — PROGRESS NOTES
[facility] ICU PROGRESS NOTE  08/15/2022      Date of Service (when I saw the patient): 08/15/2022    ASSESSMENT: Uriel Soria is a 32 year old male with PMH of gout, ETOH abuse, cirrhosis. He was admitted 8/13/22 to medicine for severe ETOH withdrawal and alcoholic hepatitis. While waiting in ED for a medicine bed to open up he clinically decompensated and became extremely agitated requiring IV benzodiazepines. He was upgraded to ICU level of care for IV BZDs and a possible precedex gtt.      CHANGES and MAJOR THINGS TODAY:     Chem Dep Consult    Psych Consult    Discontinue CIWA protocols    Investigate possible SHAWN    FeNa    Urine Na    Repeat BMP    Albumin 1 g per kg per Hepatology Recs for SHAWN      PLAN:    Neuro:  # Severe ETOH withdrawal complicated with agitation and visual hallucinations   # Alcohol use disorder   Drinks whiskey 0.5-0.75L/day. Quit drinking cold turkey Thursday 8/11. Presented with tremors and visual hallucinations which deteriorated to agitation and violent behaviors directed at staff. He was upgraded to ICU level of care at that time for IV benzos and possibly a precedex gtt if his agitation is refractory to standard care.     ? Jason taper to 1800 mg daily for alcohol cessation  ? Thiamine/folate  ? Seizure precautions  - Agreeable to Chemical dependency to search for options to help with alcohol cessation  - Psych Consult for likely undiagnosed anxiety, likely causative factor for his drinking  - Off Dex infusion since 0200      Pulmonary:  No acute issues   ? Supplemental O2 to keep SpO2 >  92%  ? CXR (8/13) unremarkable       Cardiovascular:  # Cardiac murmur, best heard at R sternal border   # Sinus tachycardia (Resolved)  ? BP goals: MAP > 65 mmHg, BP < 160/110   ? EKG on ICU arrival  ? TTE (8/14) unremarkable  ? Continuous cardiac monitor  ? Optimize electrolytes        GI/Nutrition:  # Decompensated ETOH cirrhosis with small volume ascites, hypoalbuminemia, and coagulation  defect (MELD-Na+ 28 on admission)  # Hyperbilirubinemia with elevations in ALP and AST   On admit, Tbili 20.6, , ALT 41, . INR 1.86. Creatinine 0.54. WBC 11.7. RUQ US w Doppler 8/13/22 revealed hepatomegaly with diffuse parenchymal echogenicity, reversal of flow throughout protal veins and splenic vein without thrombosis, splenomegaly, small volume ascites. Liver biopsy 6/21/21 (steatohepatitis) and MR Abd w/wo contrast 5/4/21 (see care everywhere). In June 2021, INR 1.28 and LFTs with total bili 5.9, , ALT 84, Alk Phos 111. Reports he has never had an EGD. No current concern for acute GI bleed.    ? GI hepatology consult (formal consult AM 8/14), appreciate recs:     Pan cultured to rule out infection    No paracentesis     Thiamine/folate supps     No further recomendations  ? Hep A/B/C and HIV - In process  ? Repeat tylenol level negative     MELD = 30 (8/15)      # Risk for malnutrition   # Obesity, complicates cares   ? Nutrition consult, appreciate recs  ? Regular Diet        Renal/Fluids/Electrolytes:  # High risk for refeeding syndrome   # Mild asymptomatic hyponatremia  # Hypokalemia  # Hypomagnesemia  # Hypophosphatemia   # Acute Kidney Injury  Patient with a jump in his Cr morning of 8/15 from 0.59 to 1.34. US completed 8/14 without any concerns for hydronephrosis, mass. Patient is voiding per nursing staff and does not feel bladder distention or pain. Patient had received 1.5 L of LR overnight. Unknown etiology of SHAWN, requiring further inquiry.          Urine Na         FeNa  Repeat BMP  Consider repeat UA and repeat renal U/S based on labs  High intensity ICU electrolyte replacement protocols   Strict I&O  Daily weights        Endocrine:  # Risk for hypoglycemia  ? Hypoglycemia order set in place        ID:  # Leukocytosis with concern for active infection   WBC 11.7 on arrival without neutrophilia on differential. WBCs normalized after fluid resuscitation, drop in all cell lines  so could have simply been 2/2 dehydration and plasma volume contraction. Infection concern less likely, no indication for antibiotics. Unable to perform paracentesis due to low volume to clear SBP, but little concern at this stage in his hospitalization.   ? Procal 0.66 and CRP 57, recheck AM 8/15  ? Check CXR given complaints of recent cold   ? Abd US not concerning for cholecystitis/ascending cholangitis   ? Cultures:    8/13 Blood - Negative    8/13 UC - Negative    8/14 RVP + Rhinovirus    All cultures:  Recent Labs   Lab 08/13/22 2003 08/13/22  1417   CULTURE No growth after 1 day  No growth after 1 day No Growth        Antibiotics:  - No indication for antibiotics now     Hematology:    # Thrombocytopenia likely in the setting of cirrhosis   # Normocytic anemia   # Coagulation defect, elevated INR in the setting of cirrhosis   Platelets 146 on admission; previously normal in 2021. Hemoglobin 9.7 at admission; was 12.9 ~1 year ago. LDH only in the 300's. Smear and haptoglobin remain pending to rule out hemolysis, but his thrombocytopenia and anemia are likely 2/2 cirrhosis.   ? Peripheral smear & hatpoglobin in process  ? Daily CBC and INR   ? Holding DVT chemo ppx while awaiting IR paracentesis, SCDs for now      Musculoskeletal:  No acute concerns   ? Ambulatory PTA, defer PT/OT at this time  ? Low threshold to consult if pt requires any significant time in bed to manage agitation     Skin:  No acute concerns  ? Diligent skin cares to prevent breakdown      General Cares/Prophylaxis:    DVT Prophylaxis: Pneumatic Compression Devices  GI Prophylaxis: PPI  Restraints: None  Family Communication: Wife updated at bedside  Code Status: Full Code    Lines/tubes/drains:  - PIV    Disposition:  - [facility] ICU  - Transfer to Hospitalist Services  - Likely to discharge in a day or so    Patient seen and findings/plan discussed with medical ICU staff, Dr. Phan.      Clinically Significant Risk Factors Present on  "Admission               # Overweight: Estimated body mass index is 28.18 kg/m  as calculated from the following:    Height as of this encounter: 1.753 m (5' 9\").    Weight as of this encounter: 86.5 kg (190 lb 12.8 oz).      I spent 35 minutes providing care.     [facility]  Pager #[phone]  Critical Care  [facility]     ====================================  INTERVAL HISTORY:   Patient more coherent this morning, off precedex. Low pressures overnight and received fluid bolus. Up in chair this morning eating breakfast. Had long discussion regarding his drinking. Patient stated he never thought it was a problem, reports 2-4 mixed drinks with whiskey per night, is not certain if amount has increased recently. Reports stress and anxiety regarding work may contribute to drinking, but states he simply enjoys whiskey. Patient stated he is willing to cease alcohol consumption and is open to discussions with chemical dependency and psych for support and treatment options.     OBJECTIVE:   1. VITAL SIGNS:   Temp:  [98.3  F (36.8  C)-99.1  F (37.3  C)] 99.1  F (37.3  C)  Pulse:  [] 101  Resp:  [10-26] 24  BP: ()/(48-75) 114/73  SpO2:  [90 %-97 %] 94 %  Resp: 24    2. INTAKE/ OUTPUT:   I/O last 3 completed shifts:  In: 4776.45 [P.O.:800; I.V.:2476.45; IV Piggyback:1500]  Out: 450 [Urine:450]    3. PHYSICAL EXAMINATION:  General: Sitting in chair, no acute distress.   HEENT: Normocephalic, icteric sclera, oral mucosa moist and intact.   Neuro: A&Ox3, PERRLA, moves all extremities  Pulm/Resp: Clear breath sounds bilaterally without rhonchi, crackles or wheeze, breathing non-labored.  CV: RRR, S1S2, murmur difficult to auscultate.   Abdomen: Soft, non-tender, slightly distended  :  Voiding spontaneously  Incisions/Skin: Jaundice, intact, warm.     4. LABS:   Arterial Blood Gases   No lab results found in last 7 days.  Complete Blood Count   Recent Labs   Lab 08/15/22  0531 " 22  0414 22  1416   WBC 8.5 9.4 9.4 11.7*   HGB 8.7* 8.5* 8.9* 9.7*   * 124* 123* 146*     Basic Metabolic Panel  Recent Labs   Lab 08/15/22  0531 08/15/22  0036 22  1127 22  0430 224 22  1416   *  --   --   --  133 131*   POTASSIUM 3.1*  --  3.1*  --  3.2* 3.3*   CHLORIDE 99  --   --   --  99 94   CO2 23  --   --   --  26 27   BUN 18  --   --   --  9 9   CR 1.34*  --   --   --  0.59* 0.54*   GLC 88 80  --  88 96 101*     Liver Function Tests  Recent Labs   Lab 08/15/22  0531 22  1416   * 183* 217*   ALT 36 37 41   ALKPHOS 166* 187* 219*   BILITOTAL 20.3* 18.7* 20.6*   ALBUMIN 2.0* 2.4* 2.8*   INR 1.84* 1.97* 1.86*     Coagulation Profile  Recent Labs   Lab 08/15/22  0531 224 22  1416   INR 1.84* 1.97* 1.86*   PTT 40*  --   --        5. RADIOLOGY:   Recent Results (from the past 24 hour(s))   XR Chest 1 View    Narrative    EXAM: XR CHEST 1 VIEW  2022 9:30 AM     HISTORY:  leukocytosis and reports of recent cold, rule out acute  infiltrate       COMPARISON:  None    FINDINGS:   Frontal radiograph of the chest. The trachea is midline. Cardiac  silhouette is within normal limits. No airspace opacities. No pleural  effusion or pneumothorax. The osseous structures are within normal  limits.      Impression    IMPRESSION:   No acute cardiopulmonary findings.    I have personally reviewed the examination and initial interpretation  and I agree with the findings.    BRIAN STEWART MD         SYSTEM ID:  [id number]   Echocardiogram Complete   Result Value    LVEF  55-60%    Narrative    [id number]  [id number]  [id number]  [id number]^DANIEL^MEMO^E     [facility]  Echocardiography Laboratory  [address] [place] [id number]     Name: KATHIA PABON  MRN: [id number]  : 1990  Study Date: 2022 01:01 PM  Age: 32 yrs  Gender: Male  Patient Location:  [facility]10IC  Reason For Study: Cardiac Murmur, Cardiomyopathy - Alcohol  Ordering Physician: MEMO SANCHEZ  Performed By: Delicia Phillips     BSA: 2.0 m2  Height: 69 in  Weight: 193 lb  HR: 92  BP: 82/50 mmHg  ______________________________________________________________________________  Procedure  Complete Portable Echo Adult. Contrast Optison. Optison (NDC #9598-6838-10)  given intravenously. Patient was given 6 ml mixture of 3 ml Optison and 6 ml  saline. 3 ml wasted.  ______________________________________________________________________________  Interpretation Summary  Left ventricular size, wall motion and function are normal. The ejection  fraction is 55-60%.  Right ventricular function, chamber size, wall motion, and thickness are  normal.  Normal valve assessment.  No pericardial effusion.  There is no prior study for direct comparison.  ______________________________________________________________________________  Left Ventricle  Left ventricular size, wall motion and function are normal. The ejection  fraction is 55-60%. Left ventricular diastolic function is normal.     Right Ventricle  Right ventricular function, chamber size, wall motion, and thickness are  normal.     Atria  Both atria appear normal.     Mitral Valve  The mitral valve is normal.     Aortic Valve  Aortic valve is normal in structure and function. The aortic valve is  tricuspid.     Tricuspid Valve  The tricuspid valve is normal. The peak velocity of the tricuspid regurgitant  jet is not obtainable. Pulmonary artery systolic pressure cannot be assessed.     Pulmonic Valve  The pulmonic valve is normal. Trace pulmonic insufficiency is present.     Vessels  Normal diameter aortic root and proximal ascending aorta. The inferior vena  cava cannot be assessed.     Pericardium  No pericardial effusion is present.     Compared to Previous Study  There is no prior study for direct  comparison.  ______________________________________________________________________________  MMode/2D Measurements & Calculations     IVSd: 1.0 cm  LVIDd: 5.5 cm  LVIDs: 3.7 cm  LVPWd: 1.0 cm  FS: 32.7 %  LV mass(C)d: 213.2 grams  LV mass(C)dI: 104.8 grams/m2  asc Aorta Diam: 3.1 cm  LA Volume (BP): 65.0 ml  LA Volume Index (BP): 32.0 ml/m2  RWT: 0.36     Doppler Measurements & Calculations  MV E max ck: 58.7 cm/sec  MV A max ck: 45.4 cm/sec  MV E/A: 1.3  E/E' av.7  Lateral E/e': 5.2  Medial E/e': 6.1     ______________________________________________________________________________  Report approved by: Nagi Ritter 2022 04:26 PM

## 2022-08-15 NOTE — CONSULTS
8/15/2022    CD consult completed.  Spoke with pt via bedside phone. Pt reports he wants to think about it, reports he has never been to treatment. Pt open to an email of resources to look over. Pt confirmed his email address, reports he currently has access to it. Pt reports he will reach out to writer if he has any further questions or concerns.     Shannan Montoya [facility]'s [email or web address]  Direct phone: [phone]

## 2022-08-15 NOTE — PROGRESS NOTES
Brief Hepatology Follow Up Note    History obtained via chart review, patient in [facility]    Interval Event:  - creatinine elevated today    Assessment/Plan:   - Give 1 gram/kg albumin today for SHAWN in a cirrhotic  - Continue supportive cares for alcohol withdrawal   - Trend MELD labs daily   - Agree with nutrition consult to optimize nutrition status when acute withdrawal period has improved  - thiamine and folate supplementation   - No role for steroids at this point while ruling out infections; will continue to assess      Darya Almazan MD  Transplant Hepatology

## 2022-08-16 LAB
ALBUMIN SERPL-MCNC: 2.6 G/DL (ref 3.4–5)
ALP SERPL-CCNC: 148 U/L (ref 40–150)
ALT SERPL W P-5'-P-CCNC: 31 U/L (ref 0–70)
ANION GAP SERPL CALCULATED.3IONS-SCNC: 8 MMOL/L (ref 3–14)
AST SERPL W P-5'-P-CCNC: 148 U/L (ref 0–45)
BILIRUB DIRECT SERPL-MCNC: 17.6 MG/DL (ref 0–0.2)
BILIRUB SERPL-MCNC: 22.5 MG/DL (ref 0.2–1.3)
BUN SERPL-MCNC: 16 MG/DL (ref 7–30)
CALCIUM SERPL-MCNC: 8 MG/DL (ref 8.5–10.1)
CHLORIDE BLD-SCNC: 101 MMOL/L (ref 94–109)
CO2 SERPL-SCNC: 24 MMOL/L (ref 20–32)
CREAT SERPL-MCNC: 1.03 MG/DL (ref 0.66–1.25)
ERYTHROCYTE [DISTWIDTH] IN BLOOD BY AUTOMATED COUNT: 21.4 % (ref 10–15)
GFR SERPL CREATININE-BSD FRML MDRD: >90 ML/MIN/1.73M2
GLUCOSE BLD-MCNC: 134 MG/DL (ref 70–99)
GLUCOSE BLDC GLUCOMTR-MCNC: 100 MG/DL (ref 70–99)
HCT VFR BLD AUTO: 25 % (ref 40–53)
HGB BLD-MCNC: 8 G/DL (ref 13.3–17.7)
HOLD SPECIMEN: NORMAL
INR PPP: 2.23 (ref 0.85–1.15)
MAGNESIUM SERPL-MCNC: 1.9 MG/DL (ref 1.6–2.3)
MCH RBC QN AUTO: 28.5 PG (ref 26.5–33)
MCHC RBC AUTO-ENTMCNC: 32 G/DL (ref 31.5–36.5)
MCV RBC AUTO: 89 FL (ref 78–100)
PHOSPHATE SERPL-MCNC: 1.6 MG/DL (ref 2.5–4.5)
PHOSPHATE SERPL-MCNC: 2.6 MG/DL (ref 2.5–4.5)
PLATELET # BLD AUTO: 145 10E3/UL (ref 150–450)
POTASSIUM BLD-SCNC: 2.7 MMOL/L (ref 3.4–5.3)
POTASSIUM BLD-SCNC: 3.2 MMOL/L (ref 3.4–5.3)
POTASSIUM BLD-SCNC: 3.6 MMOL/L (ref 3.4–5.3)
PROT SERPL-MCNC: 6.6 G/DL (ref 6.8–8.8)
RBC # BLD AUTO: 2.81 10E6/UL (ref 4.4–5.9)
SODIUM SERPL-SCNC: 133 MMOL/L (ref 133–144)
WBC # BLD AUTO: 9.7 10E3/UL (ref 4–11)

## 2022-08-16 PROCEDURE — 250N000013 HC RX MED GY IP 250 OP 250 PS 637: Performed by: PSYCHIATRY & NEUROLOGY

## 2022-08-16 PROCEDURE — 99233 SBSQ HOSP IP/OBS HIGH 50: CPT | Mod: GC | Performed by: FAMILY MEDICINE

## 2022-08-16 PROCEDURE — 200N000002 HC R&B ICU UMMC

## 2022-08-16 PROCEDURE — 258N000003 HC RX IP 258 OP 636: Performed by: NURSE PRACTITIONER

## 2022-08-16 PROCEDURE — 84100 ASSAY OF PHOSPHORUS: CPT | Performed by: NURSE PRACTITIONER

## 2022-08-16 PROCEDURE — 85027 COMPLETE CBC AUTOMATED: CPT | Performed by: NURSE PRACTITIONER

## 2022-08-16 PROCEDURE — 83735 ASSAY OF MAGNESIUM: CPT | Performed by: NURSE PRACTITIONER

## 2022-08-16 PROCEDURE — 250N000013 HC RX MED GY IP 250 OP 250 PS 637

## 2022-08-16 PROCEDURE — 99232 SBSQ HOSP IP/OBS MODERATE 35: CPT | Performed by: NURSE PRACTITIONER

## 2022-08-16 PROCEDURE — 258N000003 HC RX IP 258 OP 636: Performed by: STUDENT IN AN ORGANIZED HEALTH CARE EDUCATION/TRAINING PROGRAM

## 2022-08-16 PROCEDURE — 250N000013 HC RX MED GY IP 250 OP 250 PS 637: Performed by: NURSE PRACTITIONER

## 2022-08-16 PROCEDURE — 85610 PROTHROMBIN TIME: CPT | Performed by: NURSE PRACTITIONER

## 2022-08-16 PROCEDURE — 80053 COMPREHEN METABOLIC PANEL: CPT | Performed by: NURSE PRACTITIONER

## 2022-08-16 PROCEDURE — 36415 COLL VENOUS BLD VENIPUNCTURE: CPT

## 2022-08-16 PROCEDURE — 84100 ASSAY OF PHOSPHORUS: CPT

## 2022-08-16 PROCEDURE — 84132 ASSAY OF SERUM POTASSIUM: CPT

## 2022-08-16 PROCEDURE — 36415 COLL VENOUS BLD VENIPUNCTURE: CPT | Performed by: NURSE PRACTITIONER

## 2022-08-16 PROCEDURE — 250N000009 HC RX 250: Performed by: NURSE PRACTITIONER

## 2022-08-16 PROCEDURE — 250N000011 HC RX IP 250 OP 636: Performed by: STUDENT IN AN ORGANIZED HEALTH CARE EDUCATION/TRAINING PROGRAM

## 2022-08-16 PROCEDURE — 82248 BILIRUBIN DIRECT: CPT | Performed by: NURSE PRACTITIONER

## 2022-08-16 RX ORDER — FLUMAZENIL 0.1 MG/ML
0.2 INJECTION, SOLUTION INTRAVENOUS
Status: DISCONTINUED | OUTPATIENT
Start: 2022-08-16 | End: 2022-08-19 | Stop reason: HOSPADM

## 2022-08-16 RX ORDER — LORAZEPAM 1 MG/1
1-2 TABLET ORAL EVERY 30 MIN PRN
Status: DISCONTINUED | OUTPATIENT
Start: 2022-08-16 | End: 2022-08-18

## 2022-08-16 RX ORDER — LORAZEPAM 2 MG/ML
1-2 INJECTION INTRAMUSCULAR EVERY 30 MIN PRN
Status: DISCONTINUED | OUTPATIENT
Start: 2022-08-16 | End: 2022-08-18

## 2022-08-16 RX ORDER — POTASSIUM CHLORIDE 1500 MG/1
20 TABLET, EXTENDED RELEASE ORAL ONCE
Status: COMPLETED | OUTPATIENT
Start: 2022-08-16 | End: 2022-08-16

## 2022-08-16 RX ORDER — PANTOPRAZOLE SODIUM 40 MG/1
40 TABLET, DELAYED RELEASE ORAL
Status: DISCONTINUED | OUTPATIENT
Start: 2022-08-17 | End: 2022-08-16

## 2022-08-16 RX ORDER — POTASSIUM CHLORIDE 1500 MG/1
40 TABLET, EXTENDED RELEASE ORAL ONCE
Status: COMPLETED | OUTPATIENT
Start: 2022-08-16 | End: 2022-08-16

## 2022-08-16 RX ORDER — POTASSIUM CHLORIDE 1.5 G/1.58G
40 POWDER, FOR SOLUTION ORAL ONCE
Status: COMPLETED | OUTPATIENT
Start: 2022-08-16 | End: 2022-08-16

## 2022-08-16 RX ORDER — MAGNESIUM OXIDE 400 MG/1
400 TABLET ORAL EVERY 4 HOURS
Status: COMPLETED | OUTPATIENT
Start: 2022-08-16 | End: 2022-08-16

## 2022-08-16 RX ADMIN — SODIUM PHOSPHATE, MONOBASIC, MONOHYDRATE AND SODIUM PHOSPHATE, DIBASIC, ANHYDROUS 15 MMOL: 276; 142 INJECTION, SOLUTION INTRAVENOUS at 06:53

## 2022-08-16 RX ADMIN — THIAMINE HCL TAB 100 MG 100 MG: 100 TAB at 08:21

## 2022-08-16 RX ADMIN — GABAPENTIN 600 MG: 300 CAPSULE ORAL at 08:21

## 2022-08-16 RX ADMIN — Medication 400 MG: at 10:56

## 2022-08-16 RX ADMIN — POTASSIUM CHLORIDE 20 MEQ: 1500 TABLET, EXTENDED RELEASE ORAL at 08:22

## 2022-08-16 RX ADMIN — GABAPENTIN 600 MG: 300 CAPSULE ORAL at 14:46

## 2022-08-16 RX ADMIN — GABAPENTIN 600 MG: 300 CAPSULE ORAL at 20:13

## 2022-08-16 RX ADMIN — POTASSIUM CHLORIDE 40 MEQ: 1500 TABLET, EXTENDED RELEASE ORAL at 06:02

## 2022-08-16 RX ADMIN — SODIUM PHOSPHATE, MONOBASIC, MONOHYDRATE AND SODIUM PHOSPHATE, DIBASIC, ANHYDROUS 15 MMOL: 276; 142 INJECTION, SOLUTION INTRAVENOUS at 09:09

## 2022-08-16 RX ADMIN — THIAMINE HYDROCHLORIDE 500 MG: 100 INJECTION, SOLUTION INTRAMUSCULAR; INTRAVENOUS at 20:19

## 2022-08-16 RX ADMIN — POTASSIUM & SODIUM PHOSPHATES POWDER PACK 280-160-250 MG 1 PACKET: 280-160-250 PACK at 15:37

## 2022-08-16 RX ADMIN — POTASSIUM CHLORIDE 40 MEQ: 1.5 POWDER, FOR SOLUTION ORAL at 15:37

## 2022-08-16 RX ADMIN — MULTIPLE VITAMINS W/ MINERALS TAB 1 TABLET: TAB at 08:21

## 2022-08-16 RX ADMIN — THIAMINE HCL TAB 100 MG 100 MG: 100 TAB at 14:47

## 2022-08-16 RX ADMIN — FOLIC ACID 1 MG: 1 TABLET ORAL at 08:21

## 2022-08-16 RX ADMIN — MIRTAZAPINE 15 MG: 7.5 TABLET, FILM COATED ORAL at 22:28

## 2022-08-16 RX ADMIN — Medication 400 MG: at 06:03

## 2022-08-16 RX ADMIN — POTASSIUM & SODIUM PHOSPHATES POWDER PACK 280-160-250 MG 1 PACKET: 280-160-250 PACK at 18:57

## 2022-08-16 ASSESSMENT — ACTIVITIES OF DAILY LIVING (ADL)
ADLS_ACUITY_SCORE: 26

## 2022-08-16 NOTE — CONSULTS
"Consult Date: 08/15/2022    IDENTIFICATION:  Mr. Soria is a 32-year-old  white male with a long history of alcohol abuse and underlying anxiety who was brought to our Emergency Department by his wife seeking detox for alcohol abuse.  Apparently he stopped drinking 2 days prior to admission and started to experience severe withdrawal symptoms including hand shakes and both visual and auditory hallucinations.  He was unable to sleep at night.  He also noted to have dark appearing urine and has been \"turning yellow.\" The patient was admitted to ICU on WA protocol with Valium and Psychiatric consultation was ordered to assess his underlying anxiety.    HISTORY OF PRESENT ILLNESS:  I had reviewed the patient's chart and interviewed the patient in his room along with his wife.  He engaged in interview semi-reluctantly, appeared to be somewhat perplexed and only partially oriented.  He told me that he has been experiencing episodic panic attacks with the first one occurring at the age of 16 when he \"broke down\" in the middle of a soccer game and has been crying without any apparent reason along with feeling fearful and shaky.  His second episode of what seems to be a panic attack occurred while [place] and suddenly experienced the realization (felt that his body is lifting above the 's seat), had a feeling of his extremities freezing up, had difficulties breathing and heart palpitation.  He was briefly hospitalized in [place] and discharged the same day; that happened about 2-1/2 years ago and I could not find any records of that admission.  However, in 04/2021 he was seen in the Emergency Department for severe alcohol withdrawal.  He presented with hepatomegaly and diagnosed with fatty liver.  In addition to that, he describes a long history of excessive worrying about \"things in the future.\" He also has been feeling down at times, but not for any length of time.  He has been " "experiencing decreased appetite and lost about 10 pounds in 1 year.  He has been experiencing persistent mid cycle insomnia.  The patient has never been evaluated by a psychiatrist and never was prescribed any psychotropic medications.    CHEMICAL USE HISTORY:  The patient has a long history of alcohol abuse.  He states that he \"liked drinking whiskey\"and has been consuming between 1/2 and 3/4 of a liter of whiskey a day on a daily basis, usually in the evening.  His drinking has been affecting his relationship with his wife and caused some sexual dysfunction.  He tried to quit once before in the spring of 2021 and also experienced severe withdrawal symptoms with hallucinations.    The patient was seen by MARYCHUY Parker virtually, but has not been feeling by himself and was somewhat confused this morning and therefore did not remember much of what was said to him.  He was advised to enter in chemical dependency treatment and he told her that he will think about it.  His tox screen upon admission was negative.  He was SARS-CoV-2 PCR negative as well.  His blood work was remarkable for a low sodium of 131 and low potassium of 3.3.  His creatinine was 0.54 and his calcium was low at 8.1.  His alkaline phosphatase was markedly increased to 219 U/L and his AST was elevated to 217 U/L.  He also presented with anemia with RBC count of 3.55, hemoglobin of 9.7 and hematocrit of 3.3.  His white blood count was elevated at 11.7.    PAST MEDICAL HISTORY:  Not contributory.    PAST SURGICAL HISTORY:  There are no prominent surgical interventions.    ALLERGIES:  THE PATIENT IS UNAWARE OF ANY.    FAMILY HISTORY:  Remarkable for alcoholism in his aunt and his maternal grandfather.  His sister had suffered from anxiety and his cousin had some kind of emotional problems.    BRIEF SOCIAL HISTORY:  The patient was born and raised in [place].  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  He got  to his current wife in  and they have 2 children together.    MENTAL STATUS EXAMINATION:  Revealed a normally built and normally developed, generally pleasant, friendly and cooperative with the interview 32-year-old white male, appearing about his stated age.  He was alert, but did not know today's date, but knew that he was in a hospital.  He was rather hesitant to provide any coherent self assessment, but admitted to feeling \"somewhat down\" and \"very anxious.\"  He adamantly denied suicidal ideation or intent.  He currently denied hallucinations, but readily admitted to seeing \"weird looking people with weird teeth \" just 2 days prior to admission.  He saw them with his eyes open and they would disappear when he closed his eyes.  He showed no hand tremor or perspiration.  He had only marginal insight into his problems and his judgment was questionable.    DIAGNOSTIC IMPRESSION:  1.  Alcohol use disorder.  2.  Alcohol withdrawal syndrome with delirium, now resolving.  3.  Generalized anxiety disorder.  4.  Panic attacks by history.    PLAN:  I strongly recommend the patient to enter chemical dependency treatment and attend AA meetings.  I will start him on scheduled gabapentin 300 mg t.i.d. and Remeron 15 mg at bedtime to address his anxiety.  One of the SSRIs should also be considered, but I am hesitant to prescribe it now because of his existing sexual dysfunction.    Upon discharge, the patient should be referred to a psychiatrist for outpatient followup.    I thank you very much for letting me participate in the care of this patient.      Devaughn Harrell MD        D: 08/15/2022   T: 08/15/2022   MT: [id number]    Name:     KATHIA PABON  MRN:      [id number]        Account:      [id number]   :      1990           Consult Date: 08/15/2022     Document: [id number]    cc:  MD Richi Perry NP   "

## 2022-08-16 NOTE — CONSULTS
Social Work Progress Note    Social work met with patient and patient wife to discuss treatment options. Patient was evaluated by psych yesterday and they are recommending patient do an IP CD program. SW asked if this was something patient would be interested in, however, it appears he is only open to OP at this time. Social work printed the email of resources that was sent to patient and gave it to wife so that she could continue calling facilities to set up an intake appt. SW answered general questions about chemical dependency treatment and addiction. Encourage patient and wife to start looking into programs ASAP as its possible that there could be waitlists for programming. Patient and patients wife were receptive to all of this information. Patients wife believes that patient will discharge tomorrow afternoon.    Addendum 3:35 PM:  TADEO signed and faxed to [facility].    Social work will continue to follow and provide assistance to ensure a safe and timely discharge. Please feel free to re-consult or call me if there are any social work services that are needed.    BENJAMÍN Odonnell, LGSW  8A and 10 ICU   [facility]   Phone: [phone]

## 2022-08-16 NOTE — PROGRESS NOTES
[facility]    ICU TRANSFER NOTE - [facility] Service       Date of Admission:  8/13/2022    Family Medicine Progress Note - \[facility] [place] [facility]\"" Service       Main Plans for Today   - CIWA protocol with oral lorazepam  - K, phos, mag repletion  - Family to call re: CD treatment and PCP f/u  - Thiamine protocol 500 mg IV TID      Assessment & Plan      Uriel Soria is a 32 year old male with PMH of gout, ETOH abuse, cirrhosis. He was admitted 8/13/22 for severe ETOH withdrawal and alcoholic hepatitis. While waiting in ED for a medicine bed he clinically decompensated and became extremely agitated requiring IV benzodiazepines. He was upgraded to ICU level of care for IV BZDs and a precedex gtt. On 8/16 patient was no longer requiring IV benzodiazapine or dexmedetomidine. His cares were transferred to [facility] service.      # Severe ETOH withdrawal complicated with agitation and visual hallucinations consistent with delirium tremens  # Alcohol use disorder   # Anxiety   # Insomnia  Drinks whiskey 0.5-0.75L/day. Cessation of use 8/12. No hx of seizures. Has not been through treatment before. Presented with tremors and visual hallucinations which deteriorated to agitation and violent behaviors directed at staff. He was upgraded to ICU level of care at that time for IV benzos and precedex gtt. Presentation consistent with delirium tremens. Stabilized with low CIWA scoring, not requiring Precedex, as of 8/15 PM. Transferred to [facility] team 8/16 AM.  CIWA protocol was restarted. Chem dep and saw the patient on 8/15 for assessment and provided resources for CD treatment following discharge. Psych saw the patient 8/15 to discuss anxiety management. Patient is open to discussing medication options. Given recent decreased PO intake and patient's long history of heavy EtOH use, review of literature suggests benefit in continuing high-dose IV thiamine for prevention of  Wernicke's encephalopathy.   - Restart CIWA w/ lorazepam given persistent tachycardia, agitation, anxiety  - Gabapentin tapered to 1800 mg daily for alcohol cessation  - Thiamine 500 mg IV TID  - Folate 1 mg QD  - Psych consulted, appreciate recs   - Mirtazapine 15 mg PO QD  - Chem Dep consulted, appreciate recs   - Resources provided - family making calls  - Melatonin 5 mg PRN     # Decompensated ETOH cirrhosis with small volume ascites, hypoalbuminemia, and coagulation defect (MELD-Na+ 28 on admission)  # Hyperbilirubinemia with elevations in ALP and AST   # Thrombocytopenia likely in the setting of cirrhosis   # Normocytic anemia   # Coagulation defect, elevated INR in the setting of cirrhosis   # Small volume ascites  On admit, Tbili 20.6, , ALT 41, . INR 1.86. Creatinine 0.54. WBC 11.7. RUQ US w Doppler 8/13/22 revealed hepatomegaly with diffuse parenchymal echogenicity, reversal of flow throughout protal veins and splenic vein without thrombosis, splenomegaly, small volume ascites. Liver biopsy 6/21/21 (steatohepatitis) and MR Abd w/wo contrast 5/4/21 (see care everywhere). In June 2021, INR 1.28 and LFTs with total bili 5.9, , ALT 84, Alk Phos 111. Reports he has never had an EGD. No concern for acute GI bleed. S/p albumin load 8/15. Assessed to not require diagnostic paracentesis due to trace volume ascites.     MELD-Na score: 29 at 8/16/2022  4:26 AM  MELD score: 27 at 8/16/2022  4:26 AM  Calculated from:  Serum Creatinine: 1.03 mg/dL at 8/16/2022  4:26 AM  Serum Sodium: 133 mmol/L at 8/16/2022  4:26 AM  Total Bilirubin: 22.5 mg/dL at 8/16/2022  4:26 AM  INR(ratio): 2.23 at 8/16/2022  4:26 AM  Age: 32 years    - GI hepatology following, appreciate recs  - Daily CBC  - MELD labs trending    # High risk for refeeding syndrome   # Mild asymptomatic hyponatremia  # Hypokalemia  # Hypomagnesemia  # Hypophosphatemia   # Acute Kidney Injury, improving   # Risk for hypoglycemia  # Risk for  malnutrition   Patient with a jump in his Cr morning of 8/15 from 0.59 to 1.34. US completed 8/14 without any concerns for hydronephrosis, mass. Patient is voiding per nursing staff and does not feel bladder distention or pain. Patient had received 1.5 L of LR overnight. Scr now normalized to 1.03. Endorses good appetite, likely will not require electrolyte replacement in the coming days. He is at risk for refeeding, however.   - Daily CMP  - Phos replacement protocol - HIGH  - K replacement protocol- HIGH  - Mag replacement protocol- HIGH  - Hypoglycemia order set in place   - Nutrition consult, appreciate recs  - Regular Diet     # Cardiac murmur, best heard at R sternal border   # Sinus tachycardia, persistent, stable  Patient was tachycardic on arrival. 2/6 murmur was appreciated in the ED at the right sternal border. ECG and TTE in the ICU on 8/14 were normal  - Discontinue cardiac monitoring due to overall stability, EtOH withdrawal  - Optimize electrolytes       # Leukocytosis, resolved  # Rhinovirus  # Cough, stable  WBC 11.7 on arrival without neutrophilia on differential. WBCs normalized after fluid resuscitation, drop in all cell lines so could have simply been 2/2 dehydration and plasma volume contraction. Rhinovirus detected on viral screens on 8/14. COVID neg on 8/13. Unable to perform paracentesis due to low volume to clear SBP, but little concern at this stage in his hospitalization.   - No interventions at this time        Diet: Regular Diet Adult    DVT Prophylaxis: Pneumatic Compression Devices  [place] Catheter: Not present  Fluids: PO  Central Lines: None  Cardiac Monitoring: ACTIVE order. Indication: ICU  Code Status: Full Code      Disposition Plan      Expected Discharge Date: 08/17/2022, 12:00 PM      Discharge Comments: Need to recheck labs, replace lytes PRN, discuss case with hepatology prior to discharge.        The patient's care was discussed with the Attending Physician,   "Kristina COOK, MS4    Resident/Fellow Attestation   I, Haylie Hedrick MD, was present with the medical/BRIAN student who participated in the service and in the documentation of the note.  I have verified the history and personally performed the physical exam and medical decision making.  I agree with the assessment and plan of care as documented in the note.      Haylie Hedrick MD  PGY2  [facility] Service  [facility]  Securely message with the Vocera Web Console (learn more here)  Text page via [facility] Paging/Directory   Please see signed in provider for up to date coverage information      Clinically Significant Risk Factors Present on Admission               # Obesity: Estimated body mass index is 30.02 kg/m  as calculated from the following:    Height as of this encounter: 1.753 m (5' 9\").    Weight as of this encounter: 92.2 kg (203 lb 4.2 oz).        ______________________________________________________________________    Interval History    Patient seen for assessment following transfer from ICU to med-surg.    Patient reports he slept well overnight, particularly with his new medications prescribed by psychiatry. He noted difficulty in remembering all of the medications he had been given over his time in the hospital. He states he has some continued anxiety but denies ongoing tremors or instability on his feet. Patient stated irritation with continued discussion of his hepatic disease. 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     Patient and wife's questions answered.    Data reviewed today: I reviewed all medications, new labs and imaging results over the last 24 hours. I personally reviewed " no images or EKG's today.    Physical Exam   Vital Signs: Temp: 98.6  F (37  C) Temp src: Oral BP: 113/68 Pulse: 115   Resp: 22 SpO2: 95 % O2 Device: None (Room air) Oxygen Delivery: 2 LPM  Weight: 203 lbs 4.23 oz  Physical Exam  Constitutional:       General: He is not in acute distress.     Appearance: He is ill-appearing.   HENT:      Head: Normocephalic and atraumatic.      Mouth/Throat:      Mouth: Mucous membranes are moist.   Eyes:      General: Scleral icterus present.      Extraocular Movements: Extraocular movements intact.   Cardiovascular:      Rate and Rhythm: Regular rhythm. Tachycardia present.      Heart sounds: Normal heart sounds.   Pulmonary:      Effort: Pulmonary effort is normal.      Breath sounds: Normal breath sounds. No wheezing, rhonchi or rales.   Abdominal:      General: Bowel sounds are normal.      Tenderness: There is no abdominal tenderness.   Musculoskeletal:      Cervical back: Normal range of motion. No tenderness.      Right lower leg: Edema (to the ankle) present.      Left lower leg: Edema (to the ankle) present.      Comments: edema present to bilateral feet   Skin:     Coloration: Skin is jaundiced.   Neurological:      Mental Status: He is alert and oriented to person, place, and time.   Psychiatric:         Behavior: Behavior normal.         Cognition and Memory: Cognition is impaired (mild).       Data    Recent Labs   Lab 08/16/22  1204 08/16/22  0426 08/16/22  0239 08/15/22  1941 08/15/22  1619 08/15/22  1155 08/15/22  0957 08/15/22  0531 08/14/22  0430 08/14/22  0414   WBC  --  9.7  --   --   --   --   --  8.5  --  9.4   HGB  --  8.0*  --   --   --   --   --  8.7*  --  8.5*   MCV  --  89  --   --   --   --   --  89  --  86   PLT  --  145*  --   --   --   --   --  148*  --  124*   INR  --  2.23*  --   --   --   --   --  1.84*  --  1.97*   NA  --  133  --   --  131*  --  131* 132*  --  133   POTASSIUM 3.2* 2.7*  --   --  3.4  --  3.5  3.5 3.1*   < > 3.2*   CHLORIDE  --   101  --   --  99  --  99 99  --  99   CO2  --  24  --   --  25  --  24 23  --  26   BUN  --  16  --   --  19  --  20 18  --  9   CR  --  1.03  --   --  1.28*  --  1.31* 1.34*  --  0.59*   ANIONGAP  --  8  --   --  7  --  8 10  --  8   ARIES  --  8.0*  --   --  8.1*  --  8.8 8.1*  --  7.7*   GLC  --  134* 100* 119* 95   < > 89 88   < > 96   ALBUMIN  --  2.6*  --   --  2.2*  --   --  2.0*  --  2.4*   PROTTOTAL  --  6.6*  --   --  6.9  --   --  6.2*  --  6.9   BILITOTAL  --  22.5*  --   --  22.7*  --   --  20.3*  --  18.7*   ALKPHOS  --  148  --   --  180*  --   --  166*  --  187*   ALT  --  31  --   --  37  --   --  36  --  37   AST  --  148*  --   --  179*  --   --  169*  --  183*   LIPASE  --   --   --   --   --   --   --   --   --  243    < > = values in this interval not displayed.     No results found for this or any previous visit (from the past 24 hour(s)).

## 2022-08-16 NOTE — PLAN OF CARE
Goal Outcome Evaluation:    Neuro: A&Ox4, cooperative. Bed alarm and chair alarm on.     Pulmonary: RA-3L NC, currently on room air. Denies SOB or chest pain. IS education completed when awake, encouraged.     CV: ST 110s-120s. MAP >65.     GI/: regular diet. Denies nausea. BM x3 during shift. Voiding, diamond/tea color     Pain: denies    Access: PIV x2     Activity: SBA, unsteady at times

## 2022-08-16 NOTE — PROGRESS NOTES
[facility] ICU PROGRESS NOTE  08/16/2022      Date of Service (when I saw the patient): 08/16/2022    ASSESSMENT: Uriel Soria is a 32 year old male with PMH of gout, ETOH abuse, cirrhosis. He was admitted 8/13/22 to medicine for severe ETOH withdrawal and alcoholic hepatitis. While waiting in ED for a medicine bed to open up he clinically decompensated and became extremely agitated requiring IV benzodiazepines. He was upgraded to ICU level of care for IV BZDs and a possible precedex gtt.      CHANGES and MAJOR THINGS TODAY:   - Transfer out of ICU  - Electrolyte replacement      PLAN:    Neuro:  # Severe ETOH withdrawal complicated with agitation and visual hallucinations   # Alcohol use disorder   Drinks whiskey 0.5-0.75L/day. Quit drinking cold turkey Thursday 8/11. Presented with tremors and visual hallucinations which deteriorated to agitation and violent behaviors directed at staff. He was upgraded to ICU level of care at that time for IV benzos and possibly a precedex gtt if his agitation is refractory to standard care.     ? Jason taper to 1800 mg daily for alcohol cessation  ? Thiamine/folate  ? Seizure precautions  - Agreeable to Chemical dependency to search for options to help with alcohol cessation  - Psych Consult for likely undiagnosed anxiety, likely causative factor for his drinking  - Off Dex infusion since 0200 on 8/15/22      Pulmonary:  No acute issues   ? Supplemental O2 to keep SpO2 >  92%  ? CXR (8/13) unremarkable       Cardiovascular:  # Cardiac murmur, best heard at R sternal border   # Sinus tachycardia   ? BP goals: MAP > 65 mmHg, BP < 160/110   ? EKG on ICU arrival  ? TTE (8/14) unremarkable  ? Continuous cardiac monitor  ? Optimize electrolytes        GI/Nutrition:  # Decompensated ETOH cirrhosis with small volume ascites, hypoalbuminemia, and coagulation defect (MELD-Na+ 28 on admission)  # Hyperbilirubinemia with elevations in ALP and AST   On admit, Tbili 20.6, , ALT 41,  . INR 1.86. Creatinine 0.54. WBC 11.7. RUQ US w Doppler 8/13/22 revealed hepatomegaly with diffuse parenchymal echogenicity, reversal of flow throughout protal veins and splenic vein without thrombosis, splenomegaly, small volume ascites. Liver biopsy 6/21/21 (steatohepatitis) and MR Abd w/wo contrast 5/4/21 (see care everywhere). In June 2021, INR 1.28 and LFTs with total bili 5.9, , ALT 84, Alk Phos 111. Reports he has never had an EGD. No current concern for acute GI bleed.    ? GI hepatology consult (formal consult AM 8/14), appreciate recs:     Pan cultured to rule out infection    No paracentesis     Thiamine/folate supps     No further recomendations  ? Hep A/B/C and HIV - All non-reactive   ? Repeat tylenol level negative     MELD = 30 (8/15)      # Risk for malnutrition   # Obesity, complicates cares   ? Nutrition consult, appreciate recs  ? Regular Diet        Renal/Fluids/Electrolytes:  # High risk for refeeding syndrome   # Mild asymptomatic hyponatremia  # Hypokalemia  # Hypomagnesemia  # Hypophosphatemia   # Acute Kidney Injury, improving   Patient with a jump in his Cr morning of 8/15 from 0.59 to 1.34. US completed 8/14 without any concerns for hydronephrosis, mass. Patient is voiding per nursing staff and does not feel bladder distention or pain. Patient had received 1.5 L of LR overnight. Unknown etiology of SHAWN, requiring further inquiry.   High intensity ICU electrolyte replacement protocols   Strict I&O  Daily weights        Endocrine:  # Risk for hypoglycemia  ? Hypoglycemia order set in place        ID:  # Leukocytosis with concern for active infection, resolved   WBC 11.7 on arrival without neutrophilia on differential. WBCs normalized after fluid resuscitation, drop in all cell lines so could have simply been 2/2 dehydration and plasma volume contraction. Infection concern less likely, no indication for antibiotics. Unable to perform paracentesis due to low volume to clear SBP,  but little concern at this stage in his hospitalization.   ? Procal 0.66 and CRP 57, recheck AM 8/15  ? Check CXR given complaints of recent cold   ? Abd US not concerning for cholecystitis/ascending cholangitis   ? Cultures:    8/13 Blood - Negative    8/13 UC - Negative    8/14 RVP + Rhinovirus    All cultures:  Recent Labs   Lab 08/13/22 2003 08/13/22  1417   CULTURE No growth after 2 days  No growth after 2 days No Growth        Antibiotics:  - No indication for antibiotics now     Hematology:    # Thrombocytopenia likely in the setting of cirrhosis   # Normocytic anemia   # Coagulation defect, elevated INR in the setting of cirrhosis   Platelets 146 on admission; previously normal in 2021. Hemoglobin 9.7 at admission; was 12.9 ~1 year ago. LDH only in the 300's. Smear and haptoglobin remain pending to rule out hemolysis, but his thrombocytopenia and anemia are likely 2/2 cirrhosis.   ? Peripheral smear & haptoglobin: The red blood cells appear normochromic with a minor population with hypochromasia.  Poikilocytosis is increased and includes occasional codocytes.  Polychromasia is present, but not significantly increased.  Rouleaux formation is slightly increased.   The morphology of the platelets is normal.  Lymphocytes are polymorphous. Neutrophils show adequate cytoplasmic granulation and unremarkable nuclear morphology. and haptoglobin WDL  ? Daily CBC and INR   ? Holding DVT chemo ppx while awaiting IR paracentesis, SCDs for now        Musculoskeletal:  No acute concerns   ? Ambulatory PTA, defer PT/OT at this time  ? Low threshold to consult if pt requires any significant time in bed to manage agitation     Skin:  No acute concerns  ? Diligent skin cares to prevent breakdown      General Cares/Prophylaxis:    DVT Prophylaxis: Pneumatic Compression Devices  GI Prophylaxis: PPI  Restraints: None  Family Communication: Wife updated at bedside  Code Status: Full Code    Lines/tubes/drains:  -  "PIV    Disposition:  - [facility] [place] [facility]  - Transfer to Hospitalist Services  - Likely to discharge in a day or so    Patient seen and findings/plan discussed with medical ICU staff, Dr. Phan.      Clinically Significant Risk Factors Present on Admission               # Obesity: Estimated body mass index is 30.02 kg/m  as calculated from the following:    Height as of this encounter: 1.753 m (5' 9\").    Weight as of this encounter: 92.2 kg (203 lb 4.2 oz).      I spent 30 minutes providing care.     Quin ARROYO, EVA    ====================================  INTERVAL HISTORY:   Patient evaluated at bedside.  No significant events overnight.  Endorses feeling unsteady on feet and mild anxiety.  Not requiring IV medications for ETOH withdrawal in last 24 hours.  Patient endorses that he \"needs to stop drinking so I don't die in the my 30s.\"  Discussed having a plan, such as AA meetings to help maintain sobriety once he returns home.  Denies headache, shortness of breath, chest pain, nausea.        OBJECTIVE:   1. VITAL SIGNS:   Temp:  [98.6  F (37  C)-100.3  F (37.9  C)] 99.5  F (37.5  C)  Pulse:  [105-137] 116  Resp:  [13-31] 26  BP: (102-145)/() 124/77  SpO2:  [89 %-98 %] 95 %  Resp: 26    2. INTAKE/ OUTPUT:   I/O last 3 completed shifts:  In: 1845 [P.O.:240; I.V.:1605]  Out: 2050 [Urine:2050]    3. PHYSICAL EXAMINATION:  General: Sitting in chair, no acute distress.   HEENT: Normocephalic, icteric sclera, oral mucosa moist and intact.   Neuro: A&Ox3, PERRLA, moves all extremities  Pulm/Resp: Clear breath sounds bilaterally without rhonchi, crackles or wheeze, breathing non-labored.  CV: RRR, S1S2, murmur difficult to auscultate, most noted at right sternal border   Abdomen: Soft, non-tender, slightly distended  :  Voiding spontaneously  Incisions/Skin: Jaundice, intact, warm.     4. LABS:   Arterial Blood Gases   No lab results found in last 7 days.  Complete Blood Count   Recent Labs   Lab " 08/16/22  0426 08/15/22  0531 08/14/22  0414 08/13/22 2003   WBC 9.7 8.5 9.4 9.4   HGB 8.0* 8.7* 8.5* 8.9*   * 148* 124* 123*     Basic Metabolic Panel  Recent Labs   Lab 08/16/22  0426 08/16/22  0239 08/15/22  1941 08/15/22  1619 08/15/22  1155 08/15/22  0957 08/15/22  0531     --   --  131*  --  131* 132*   POTASSIUM 2.7*  --   --  3.4  --  3.5  3.5 3.1*   CHLORIDE 101  --   --  99  --  99 99   CO2 24  --   --  25  --  24 23   BUN 16  --   --  19  --  20 18   CR 1.03  --   --  1.28*  --  1.31* 1.34*   * 100* 119* 95   < > 89 88    < > = values in this interval not displayed.     Liver Function Tests  Recent Labs   Lab 08/16/22  0426 08/15/22  1619 08/15/22  0531 08/14/22  0414 08/13/22  1416   * 179* 169* 183* 217*   ALT 31 37 36 37 41   ALKPHOS 148 180* 166* 187* 219*   BILITOTAL 22.5* 22.7* 20.3* 18.7* 20.6*   ALBUMIN 2.6* 2.2* 2.0* 2.4* 2.8*   INR 2.23*  --  1.84* 1.97* 1.86*     Coagulation Profile  Recent Labs   Lab 08/16/22 0426 08/15/22  0531 08/14/22  0414 08/13/22  1416   INR 2.23* 1.84* 1.97* 1.86*   PTT  --  40*  --   --        5. RADIOLOGY:   No results found for this or any previous visit (from the past 24 hour(s)).

## 2022-08-17 LAB
ALBUMIN SERPL-MCNC: 2.3 G/DL (ref 3.4–5)
ALP SERPL-CCNC: 136 U/L (ref 40–150)
ALT SERPL W P-5'-P-CCNC: 29 U/L (ref 0–70)
ANION GAP SERPL CALCULATED.3IONS-SCNC: 7 MMOL/L (ref 3–14)
AST SERPL W P-5'-P-CCNC: 107 U/L (ref 0–45)
BILIRUB DIRECT SERPL-MCNC: 18.4 MG/DL (ref 0–0.2)
BILIRUB SERPL-MCNC: 21.4 MG/DL (ref 0.2–1.3)
BUN SERPL-MCNC: 13 MG/DL (ref 7–30)
CALCIUM SERPL-MCNC: 7.6 MG/DL (ref 8.5–10.1)
CHLORIDE BLD-SCNC: 105 MMOL/L (ref 94–109)
CO2 SERPL-SCNC: 22 MMOL/L (ref 20–32)
CREAT SERPL-MCNC: 0.81 MG/DL (ref 0.66–1.25)
ERYTHROCYTE [DISTWIDTH] IN BLOOD BY AUTOMATED COUNT: 21.7 % (ref 10–15)
GFR SERPL CREATININE-BSD FRML MDRD: >90 ML/MIN/1.73M2
GLUCOSE BLD-MCNC: 102 MG/DL (ref 70–99)
HCT VFR BLD AUTO: 24.5 % (ref 40–53)
HGB BLD-MCNC: 7.8 G/DL (ref 13.3–17.7)
INR PPP: 2.23 (ref 0.85–1.15)
MAGNESIUM SERPL-MCNC: 1.9 MG/DL (ref 1.6–2.3)
MCH RBC QN AUTO: 28.8 PG (ref 26.5–33)
MCHC RBC AUTO-ENTMCNC: 31.8 G/DL (ref 31.5–36.5)
MCV RBC AUTO: 90 FL (ref 78–100)
PHOSPHATE SERPL-MCNC: 1.8 MG/DL (ref 2.5–4.5)
PHOSPHATE SERPL-MCNC: 2.6 MG/DL (ref 2.5–4.5)
PLATELET # BLD AUTO: 152 10E3/UL (ref 150–450)
POTASSIUM BLD-SCNC: 3 MMOL/L (ref 3.4–5.3)
POTASSIUM BLD-SCNC: 3.7 MMOL/L (ref 3.4–5.3)
PROT SERPL-MCNC: 6.3 G/DL (ref 6.8–8.8)
RBC # BLD AUTO: 2.71 10E6/UL (ref 4.4–5.9)
SODIUM SERPL-SCNC: 134 MMOL/L (ref 133–144)
WBC # BLD AUTO: 8.2 10E3/UL (ref 4–11)

## 2022-08-17 PROCEDURE — 250N000013 HC RX MED GY IP 250 OP 250 PS 637: Performed by: FAMILY MEDICINE

## 2022-08-17 PROCEDURE — 85610 PROTHROMBIN TIME: CPT | Performed by: NURSE PRACTITIONER

## 2022-08-17 PROCEDURE — 36415 COLL VENOUS BLD VENIPUNCTURE: CPT | Performed by: NURSE PRACTITIONER

## 2022-08-17 PROCEDURE — 250N000013 HC RX MED GY IP 250 OP 250 PS 637: Performed by: NURSE PRACTITIONER

## 2022-08-17 PROCEDURE — 258N000003 HC RX IP 258 OP 636: Performed by: STUDENT IN AN ORGANIZED HEALTH CARE EDUCATION/TRAINING PROGRAM

## 2022-08-17 PROCEDURE — 80053 COMPREHEN METABOLIC PANEL: CPT | Performed by: NURSE PRACTITIONER

## 2022-08-17 PROCEDURE — 250N000013 HC RX MED GY IP 250 OP 250 PS 637

## 2022-08-17 PROCEDURE — 258N000003 HC RX IP 258 OP 636: Performed by: FAMILY MEDICINE

## 2022-08-17 PROCEDURE — 82248 BILIRUBIN DIRECT: CPT | Performed by: NURSE PRACTITIONER

## 2022-08-17 PROCEDURE — 84100 ASSAY OF PHOSPHORUS: CPT | Performed by: FAMILY MEDICINE

## 2022-08-17 PROCEDURE — 250N000011 HC RX IP 250 OP 636: Performed by: STUDENT IN AN ORGANIZED HEALTH CARE EDUCATION/TRAINING PROGRAM

## 2022-08-17 PROCEDURE — 120N000002 HC R&B MED SURG/OB UMMC

## 2022-08-17 PROCEDURE — 83735 ASSAY OF MAGNESIUM: CPT | Performed by: NURSE PRACTITIONER

## 2022-08-17 PROCEDURE — 84100 ASSAY OF PHOSPHORUS: CPT | Performed by: NURSE PRACTITIONER

## 2022-08-17 PROCEDURE — 250N000011 HC RX IP 250 OP 636: Performed by: FAMILY MEDICINE

## 2022-08-17 PROCEDURE — 36415 COLL VENOUS BLD VENIPUNCTURE: CPT | Performed by: FAMILY MEDICINE

## 2022-08-17 PROCEDURE — 99233 SBSQ HOSP IP/OBS HIGH 50: CPT | Mod: GC | Performed by: FAMILY MEDICINE

## 2022-08-17 PROCEDURE — 250N000009 HC RX 250: Performed by: FAMILY MEDICINE

## 2022-08-17 PROCEDURE — 85027 COMPLETE CBC AUTOMATED: CPT | Performed by: NURSE PRACTITIONER

## 2022-08-17 PROCEDURE — 84132 ASSAY OF SERUM POTASSIUM: CPT | Performed by: FAMILY MEDICINE

## 2022-08-17 PROCEDURE — 250N000013 HC RX MED GY IP 250 OP 250 PS 637: Performed by: PSYCHIATRY & NEUROLOGY

## 2022-08-17 PROCEDURE — 250N000012 HC RX MED GY IP 250 OP 636 PS 637: Performed by: STUDENT IN AN ORGANIZED HEALTH CARE EDUCATION/TRAINING PROGRAM

## 2022-08-17 RX ORDER — PREDNISONE 20 MG/1
40 TABLET ORAL DAILY
Status: CANCELLED | OUTPATIENT
Start: 2022-08-17

## 2022-08-17 RX ORDER — POTASSIUM CHLORIDE 1500 MG/1
40 TABLET, EXTENDED RELEASE ORAL ONCE
Status: COMPLETED | OUTPATIENT
Start: 2022-08-17 | End: 2022-08-17

## 2022-08-17 RX ORDER — MAGNESIUM SULFATE HEPTAHYDRATE 40 MG/ML
2 INJECTION, SOLUTION INTRAVENOUS ONCE
Status: COMPLETED | OUTPATIENT
Start: 2022-08-17 | End: 2022-08-17

## 2022-08-17 RX ORDER — PREDNISOLONE 15 MG/5 ML
40 SOLUTION, ORAL ORAL DAILY
Status: DISCONTINUED | OUTPATIENT
Start: 2022-08-17 | End: 2022-08-19 | Stop reason: HOSPADM

## 2022-08-17 RX ORDER — POTASSIUM CHLORIDE 1500 MG/1
20 TABLET, EXTENDED RELEASE ORAL ONCE
Status: COMPLETED | OUTPATIENT
Start: 2022-08-17 | End: 2022-08-17

## 2022-08-17 RX ADMIN — MIRTAZAPINE 15 MG: 7.5 TABLET, FILM COATED ORAL at 23:31

## 2022-08-17 RX ADMIN — MULTIPLE VITAMINS W/ MINERALS TAB 1 TABLET: TAB at 08:42

## 2022-08-17 RX ADMIN — POTASSIUM PHOSPHATE, MONOBASIC AND POTASSIUM PHOSPHATE, DIBASIC 15 MMOL: 224; 236 INJECTION, SOLUTION INTRAVENOUS at 08:43

## 2022-08-17 RX ADMIN — GABAPENTIN 300 MG: 300 CAPSULE ORAL at 08:42

## 2022-08-17 RX ADMIN — GABAPENTIN 300 MG: 300 CAPSULE ORAL at 13:59

## 2022-08-17 RX ADMIN — POTASSIUM PHOSPHATE, MONOBASIC AND POTASSIUM PHOSPHATE, DIBASIC 9 MMOL: 224; 236 INJECTION, SOLUTION INTRAVENOUS at 17:16

## 2022-08-17 RX ADMIN — POTASSIUM CHLORIDE 20 MEQ: 1500 TABLET, EXTENDED RELEASE ORAL at 08:43

## 2022-08-17 RX ADMIN — THIAMINE HYDROCHLORIDE 500 MG: 100 INJECTION, SOLUTION INTRAMUSCULAR; INTRAVENOUS at 17:43

## 2022-08-17 RX ADMIN — THIAMINE HYDROCHLORIDE 500 MG: 100 INJECTION, SOLUTION INTRAMUSCULAR; INTRAVENOUS at 08:43

## 2022-08-17 RX ADMIN — SENNOSIDES AND DOCUSATE SODIUM 2 TABLET: 50; 8.6 TABLET ORAL at 19:16

## 2022-08-17 RX ADMIN — POTASSIUM PHOSPHATE, MONOBASIC AND POTASSIUM PHOSPHATE, DIBASIC 15 MMOL: 224; 236 INJECTION, SOLUTION INTRAVENOUS at 13:09

## 2022-08-17 RX ADMIN — MAGNESIUM SULFATE 2 G: 2 INJECTION INTRAVENOUS at 06:38

## 2022-08-17 RX ADMIN — GABAPENTIN 300 MG: 300 CAPSULE ORAL at 19:16

## 2022-08-17 RX ADMIN — FOLIC ACID 1 MG: 1 TABLET ORAL at 08:42

## 2022-08-17 RX ADMIN — POTASSIUM CHLORIDE 40 MEQ: 1500 TABLET, EXTENDED RELEASE ORAL at 06:55

## 2022-08-17 RX ADMIN — Medication 5 MG: at 19:16

## 2022-08-17 RX ADMIN — PREDNISOLONE SODIUM PHOSPHATE 40 MG: 15 SOLUTION ORAL at 13:59

## 2022-08-17 RX ADMIN — POLYETHYLENE GLYCOL 3350 17 G: 17 POWDER, FOR SOLUTION ORAL at 19:16

## 2022-08-17 ASSESSMENT — ACTIVITIES OF DAILY LIVING (ADL)
ADLS_ACUITY_SCORE: 26
ADLS_ACUITY_SCORE: 26
ADLS_ACUITY_SCORE: 20
ADLS_ACUITY_SCORE: 20
ADLS_ACUITY_SCORE: 26
ADLS_ACUITY_SCORE: 20
ADLS_ACUITY_SCORE: 26
ADLS_ACUITY_SCORE: 26
ADLS_ACUITY_SCORE: 20
ADLS_ACUITY_SCORE: 26

## 2022-08-17 NOTE — DISCHARGE SUMMARY
[facility]  Discharge Summary - Medicine & Pediatrics       Date of Admission:  8/13/2022  Date of Discharge:  8/19/2022  Discharging Provider: Dr. Fred Oquendo  Discharge Service: [facility]    Discharge Diagnoses   Alcohol dependence with withdrawal with perceptual disturbance  Delirium Tremens  Acute liver failure without hepatic coma  Hyperbilirubinemia  Hypophosphatemia  Hypokalemia  Coagulopathy  Leukocytosis  SHAWN  Rhinovirus URI  Sinus tachycardia  Bilateral LE edema  Anxiety  Insomnia    Follow-ups Needed After Discharge    - Follow-up with PCP for hospital follow up and lab recheck early next week   - Recommended CBC, CMP, phos, INR -- ORDERED - patient will obtain before appointment time   - Discuss ongoing PO thiamine, folic acid, phosphate, multivitamin for alcohol use disorder   - Discuss ongoing anxiety treatment with new daily mirtazapine and PRN hydroxyzine. Interested in further daily  meds if anxiety not well controlled   - Inquire about plans for alcohol treatment -- at high risk for returning to use and liver failure  - Hepatology will monitor above labs and advise re: continued steroids/taper  - Follow-up with substance use counseling   - assessment for treatment for substance use disorder    Discharge Disposition   Discharged to home  Condition at discharge: Stable    Hospital Course   Uriel Soria is a 32 year old male with PMH of gout, ETOH abuse, cirrhosis. He was admitted 8/13/22 for severe ETOH withdrawal and alcoholic hepatitis. While waiting in ED for a medicine bed he clinically decompensated and became extremely agitated requiring IV benzodiazepines. He was upgraded to ICU level of care for IV BZDs and a precedex gtt. On 8/16 patient was no longer requiring IV benzodiazapine or dexmedetomidine; transferred to medicine service for continued management. The following problems were addressed during his  hospitalization:     Severe ETOH withdrawal complicated with agitation and visual hallucinations consistent with delirium tremens, resolved  Alcohol use disorder   Anxiety   Insomnia  Reported drinking whiskey 0.5-0.75L/day. Cessation of use 8/12. No hx of seizures. Has not been through treatment before. Presented with tremors and visual hallucinations which deteriorated to agitation and violent behaviors directed at staff. He was upgraded to ICU level of care at that time for IV benzos and precedex gtt. Presentation consistent with delirium tremens. Stabilized with low CIWA scoring, not requiring Precedex, as of 8/15 PM. Transferred to [facility] team 8/16 AM.  CIWA protocol remained low and he required no further BZDs. Chem dep and saw the patient on 8/15 for assessment and provided resources for CD treatment following discharge. Psych saw the patient 8/15 to discuss anxiety management and prescribed remeron. High-dose IV thiamine for prevention of Wernicke's encephalopathy was give due to recent decreased PO intake and patient's long history of heavy EtOH use. Patient's wife notes she has made an appointment for CD assessment. Patient did not desire IP treatment despite team's recommendations and concerns, due to family and work needs.  - Continue Gabapentin 300 mg TID  - Continue Thiamine 100 mg PO QD  - Continue Mirtazapine 15 mg PO QD  - Melatonin 5 mg nightly PRN   - Trial PRN hydroxyzine 25 mg PRN QID   - Assessment for substance use disorder treatment - TBD     Decompensated ETOH cirrhosis with small volume ascites, hypoalbuminemia, and coagulation defect (MELD-Na+ 28 on admission)  Hyperbilirubinemia with elevations in ALP and AST   Thrombocytopenia  Normocytic anemia   Coagulation defect, elevated INR in the setting of cirrhosis   On admit, RUQ US w Doppler 8/13/22 revealed hepatomegaly with diffuse parenchymal echogenicity, reversal of flow throughout protal veins and splenic vein without  thrombosis, splenomegaly, small volume ascites (therefore did not require paracentesis). Liver biopsy 6/21/21 (steatohepatitis) and MR Abd w/wo contrast 5/4/21. In June 2021, INR 1.28 and LFTs with total bili 5.9, , ALT 84, Alk Phos 111. Reports he has never had an EGD. Albumin load given 8/15. No concern for acute GI bleed. Due to ongoing hepatitis and synthetic dysfunction, started daily prednisolone 8/18 per hepatology - MELD labs recheck at hospital follow up 8/23 will determine further dosing/taper.     MELD-Na score: 26 at 8/19/2022  8:11 AM  MELD score: 26 at 8/19/2022  8:11 AM  Calculated from:  Serum Creatinine: 0.78 mg/dL (Using min of 1 mg/dL) at 8/19/2022  8:11 AM  Serum Sodium: 136 mmol/L at 8/19/2022  8:11 AM  Total Bilirubin: 21.2 mg/dL at 8/19/2022  8:11 AM  INR(ratio): 2.03 at 8/18/2022  9:15 AM  Age: 32 years     - Start Prednisone 40 mg PO daily per hepatology team  - MELD labs at clinic with PCP to calculate Lille score--[facility] hepatology to review and advise on further prednisone dosing and taper. They will be in contact with [place] provider and patient.  - No need for hepatology clinic follow up at this time     Refeeding syndrome   Mild asymptomatic hyponatremia, resolved  Hypokalemia, resolved  Hypomagnesemia, resolved  Hypophosphatemia  Risk for hypoglycemia  Risk for malnutrition   Persistently required IV potassium and phosphorus repletion as he advances his diet, concerning for refeeding syndrome. K as low as 2.7 on 8/16, stable at time of discharge at 3.7. Phos as low as 1.6 8/16 AM, was 1.8 at time of discharge. Dietician met with the patient to provide resources on high-potassium and phosphate foods.   - Phosphate 500mg PO TID for 7 days through PCP follow up  - Recommend CMP, phos recheck on 8/23     Bilateral lower extremity edema  Patient has had swelling of the feet and ankles since admission. Likely secondary to low serum proteins. S/p albumin repletion 8/15. As of 8/18  patient stated it was slowly resolving.   - Continue using compression stockings and elevation as able    Acute kidney injury, resolved  Patient had a jump in his Cr morning of 8/15 from 0.59 to 1.34. US completed 8/14 without any concerns for hydronephrosis, mass. Patient was voiding per nursing staff and did not feel bladder distention or pain. Creatinine normalized as of 8/16.     Cardiac murmur, best heard at R sternal border   Sinus tachycardia, intermittent, stable  Patient has been persistently tachycardic since arrival. 2/6 murmur was appreciated in the ED at the right sternal border. ECG and TTE in the ICU on 8/14 were normal. Repeat ECG on 8/18 was unchanged from previous and not concerning.   - No follow up needed      Leukocytosis, resolved  Rhinovirus  Cough, stable  Rhinovirus detected on viral screens on 8/14. COVID neg on 8/13.     Consultations This Hospital Stay   CARE MANAGEMENT / SOCIAL WORK IP CONSULT  NUTRITION SERVICES ADULT IP CONSULT  GI HEPATOLOGY ADULT IP CONSULT  INTERNAL MEDICINE PROCEDURE TEAM ADULT IP CONSULT [facility] - [facility]  NUTRITION SERVICES ADULT IP CONSULT  INTERVENTIONAL RADIOLOGY ADULT/PEDS IP CONSULT  CHEMICAL DEPENDENCY IP CONSULT  PSYCHIATRY IP CONSULT  CHEMICAL DEPENDENCY IP CONSULT    Code Status   Prior       The patient was discussed with Dr. Oquendo.    Wayne Santacruz, MS4  [employer]     Resident/Fellow Attestation   I, Haylie Hedrick MD, was present with the medical/BRIAN student who participated in the service and in the documentation of the note.  I have verified the history and personally performed the physical exam and medical decision making.  I agree with the assessment and plan of care as documented in the note.      Haylie Hedrick MD  PGY2  Date of Service (when I saw the patient): 08/19/22     [address] [address]  Phone: [phone]  Fax:  [phone]  ______________________________________________________________________    Physical Exam   Vital Signs: Temp: 98.5  F (36.9  C) Temp src: Oral BP: 121/81 Pulse: 110   Resp: 16 SpO2: 96 % O2 Device: None (Room air)    Weight: 203 lbs 4.23 oz  Physical Exam  Constitutional:       General: He is not in acute distress.     Appearance: Normal appearance. He is ill-appearing (chronically).   HENT:      Head: Normocephalic and atraumatic.      Nose: No congestion or rhinorrhea.      Mouth/Throat:      Mouth: Mucous membranes are moist.   Eyes:      General: Scleral icterus present.      Extraocular Movements: Extraocular movements intact.   Cardiovascular:      Rate and Rhythm: Tachycardia present.   Pulmonary:      Effort: Pulmonary effort is normal.   Abdominal:      General: There is distension.   Musculoskeletal:      Cervical back: Normal range of motion.      Right lower leg: Edema (2+ pitting) present.      Left lower leg: Edema (2+ pitting) present.   Skin:     General: Skin is warm and dry.      Coloration: Skin is jaundiced.   Neurological:      Mental Status: He is alert and oriented to person, place, and time.   Psychiatric:         Mood and Affect: Mood normal.         Behavior: Behavior normal.         Thought Content: Thought content normal.         Judgment: Judgment normal.         Primary Care Physician   Physician No Ref-Primary    Discharge Orders      Comprehensive metabolic panel (BMP + Alb, Alk Phos, ALT, AST, Total. Bili, TP)     CBC with platelets     INR     Phosphorus     Reason for your hospital stay    You were hospitalized for severe alcohol withdrawal and experienced delirium tremens. You were in the intensive care unit for several days receiving IV treatment. You later remained hospitalized for severe electrolyte abnormalities, specifically low potassium and phosphorus.     Activity    Your activity upon discharge: activity as tolerated     Adult [facility] Follow-up and  recommended labs and tests    Follow up with new primary care provider on Tuesday 8/23. You should arrive early for lab blood testing.     You will receive a call from the liver team (also called hepatology) about the steroid (prednisone) and how long you should remain on it, depending on what your blood work shows. Please be sure to answer any calls from them.    Appointments on [place] and/or [facility] (with [facility] or [facility] provider or service). Call [phone] if you haven't heard regarding these appointments within 7 days of discharge.     Diet    Follow this diet upon discharge: Orders Placed This Encounter      Regular Diet Adult       Significant Results and Procedures   Most Recent 3 CBC's:  Recent Labs   Lab Test 08/18/22  0915 08/17/22  0521 08/16/22  0426   WBC 8.3 8.2 9.7   HGB 8.7* 7.8* 8.0*   MCV 93 90 89    152 145*     Most Recent 3 BMP's:  Recent Labs   Lab Test 08/19/22  0811 08/18/22  1738 08/18/22  0915    136 137   POTASSIUM 3.4 4.3 3.7   CHLORIDE 107 107 107   CO2 22 24 25   BUN 12 13 13   CR 0.78 0.97 1.04   ANIONGAP 7 5 5   ARIES 7.8* 7.8* 8.0*   GLC 88 120* 107*     Most Recent 2 LFT's:  Recent Labs   Lab Test 08/19/22  0811 08/18/22  0915   * 104*   ALT 45 33   ALKPHOS 164* 155*   BILITOTAL 21.2* 23.6*     Most Recent 3 INR's:  Recent Labs   Lab Test 08/18/22  0915 08/17/22  0521 08/16/22  0426   INR 2.03* 2.23* 2.23*   ,   Results for orders placed or performed during the hospital encounter of 08/13/22   US Abdomen Complete w Doppler Complete    Narrative    EXAM: US ABDOMEN COMPLETE WITH DOPPLER COMPLETE  LOCATION: [facility]  DATE/TIME: 8/13/2022 3:58 PM    INDICATION: Acute liver failure.  COMPARISON: Abdominal ultrasound 04/20/2021.  TECHNIQUE: Complete abdominal ultrasound. Color flow with spectral Doppler and waveform analysis performed.     FINDINGS:    GALLBLADDER: Gallbladder is contracted and not well  visualized. There may be some gallbladder sludge. No pericholecystic fluid or significant gallbladder wall thickening.    BILE DUCTS: No intrahepatic biliary ductal dilation. Common bile duct is not well visualized.    LIVER: Hepatomegaly with increased parenchymal echogenicity, compatible with diffuse hepatocellular disease. There is an accessory lobe measuring up to 6.3 cm. No focal liver lesions are identified.    RIGHT KIDNEY: Measures 12.5 cm in length. Normal echogenicity with no hydronephrosis or mass.     LEFT KIDNEY: Measures 13.2 cm in length. Normal echogenicity with no hydronephrosis or mass.    SPLEEN: Splenomegaly, measuring 16.7 cm.    PANCREAS: The visualized portions are normal.    AORTA: Normal in caliber.    IVC: Normal where visualized.    Small volume ascites within the right upper quadrant and both lower quadrants.    ABDOMINAL DUPLEX: Reversal of flow within the patent right, left, and main portal veins as well as the splenic vein. Hepatic veins are patent with normal anterograde flow. Hepatic arteries are patent with normal waveforms.      Impression    IMPRESSION:    1.  Hepatomegaly with increased parenchymal echogenicity, compatible with diffuse hepatocellular disease.    2.  Reversal of flow throughout the portal veins and the splenic vein, without thrombosis. Remainder of the liver Doppler is normal.    3.  Splenomegaly.    4.  Small volume ascites.    5.  Gallbladder is contracted and may contain some sludge. No intrahepatic biliary ductal dilation. Common bile duct is not well visualized.   XR Chest 1 View    Narrative    EXAM: XR CHEST 1 VIEW  8/14/2022 9:30 AM     HISTORY:  leukocytosis and reports of recent cold, rule out acute  infiltrate       COMPARISON:  None    FINDINGS:   Frontal radiograph of the chest. The trachea is midline. Cardiac  silhouette is within normal limits. No airspace opacities. No pleural  effusion or pneumothorax. The osseous structures are within  normal  limits.      Impression    IMPRESSION:   No acute cardiopulmonary findings.    I have personally reviewed the examination and initial interpretation  and I agree with the findings.    BRIAN STEWART MD         SYSTEM ID:  [id number]   Echocardiogram Complete     Value    LVEF  55-60%    Narrative    [id number]  [id number]  [id number]  [id number]^DANIEL^MEMO^TANYA     [facility],[place]  Echocardiography Laboratory  [address] [place] [address]     Name: KATHIA PABON  MRN: [id number]  : 1990  Study Date: 2022 01:01 PM  Age: 32 yrs  Gender: Male  Patient Location: [facility]  Reason For Study: Cardiac Murmur, Cardiomyopathy - Alcohol  Ordering Physician: MEMO SANCHEZ  Performed By: Delicia Phillips     BSA: 2.0 m2  Height: 69 in  Weight: 193 lb  HR: 92  BP: 82/50 mmHg  ______________________________________________________________________________  Procedure  Complete Portable Echo Adult. Contrast Optison. Optison (NDC #2732-9349-46)  given intravenously. Patient was given 6 ml mixture of 3 ml Optison and 6 ml  saline. 3 ml wasted.  ______________________________________________________________________________  Interpretation Summary  Left ventricular size, wall motion and function are normal. The ejection  fraction is 55-60%.  Right ventricular function, chamber size, wall motion, and thickness are  normal.  Normal valve assessment.  No pericardial effusion.  There is no prior study for direct comparison.  ______________________________________________________________________________  Left Ventricle  Left ventricular size, wall motion and function are normal. The ejection  fraction is 55-60%. Left ventricular diastolic function is normal.     Right Ventricle  Right ventricular function, chamber size, wall motion, and thickness are  normal.     Atria  Both atria appear normal.     Mitral Valve  The mitral valve is normal.     Aortic Valve  Aortic valve is normal in  structure and function. The aortic valve is  tricuspid.     Tricuspid Valve  The tricuspid valve is normal. The peak velocity of the tricuspid regurgitant  jet is not obtainable. Pulmonary artery systolic pressure cannot be assessed.     Pulmonic Valve  The pulmonic valve is normal. Trace pulmonic insufficiency is present.     Vessels  Normal diameter aortic root and proximal ascending aorta. The inferior vena  cava cannot be assessed.     Pericardium  No pericardial effusion is present.     Compared to Previous Study  There is no prior study for direct comparison.  ______________________________________________________________________________  MMode/2D Measurements & Calculations     IVSd: 1.0 cm  LVIDd: 5.5 cm  LVIDs: 3.7 cm  LVPWd: 1.0 cm  FS: 32.7 %  LV mass(C)d: 213.2 grams  LV mass(C)dI: 104.8 grams/m2  asc Aorta Diam: 3.1 cm  LA Volume (BP): 65.0 ml  LA Volume Index (BP): 32.0 ml/m2  RWT: 0.36     Doppler Measurements & Calculations  MV E max ck: 58.7 cm/sec  MV A max ck: 45.4 cm/sec  MV E/A: 1.3  E/E' av.7  Lateral E/e': 5.2  Medial E/e': 6.1     ______________________________________________________________________________  Report approved by: Nagi Ritter 2022 04:26 PM               Discharge Medications   Discharge Medication List as of 2022 12:40 PM      START taking these medications    Details   folic acid (FOLVITE) 1 MG tablet Take 1 tablet (1 mg) by mouth daily for 30 days, Disp-30 tablet, R-0, E-Prescribe      gabapentin (NEURONTIN) 300 MG capsule Take 1 capsule (300 mg) by mouth 3 times daily for 30 days, Disp-90 capsule, R-0, E-Prescribe      hydrOXYzine (ATARAX) 25 MG tablet Take 1 tablet (25 mg) by mouth every 6 hours as needed for anxiety, Disp-30 tablet, R-0, E-Prescribe      melatonin 3 MG tablet Take 1 tablet (3 mg) by mouth nightly as needed for sleep, Disp-30 tablet, R-0, E-Prescribe      mirtazapine (REMERON) 15 MG tablet Take 1 tablet (15 mg) by mouth At  Bedtime for 30 days, Disp-30 tablet, R-0, E-Prescribe      multivitamin w/minerals (THERA-VIT-M) tablet Take 1 tablet by mouth daily for 30 days, Disp-30 tablet, R-0, E-Prescribe      predniSONE (DELTASONE) 20 MG tablet Take 2 tablets (40 mg) by mouth daily for 7 days, Disp-14 tablet, R-0, E-Prescribe      thiamine (B-1) 100 MG tablet Take 1 tablet (100 mg) by mouth daily for 30 days, Disp-30 tablet, R-0, E-Prescribe         CONTINUE these medications which have CHANGED    Details   phosphorus tablet 250 mg (PHOSPHA 250 NEUTRAL) 250 MG per tablet Take 2 tablets (500 mg) by mouth 3 times daily, Disp-63 tablet, R-0, E-Prescribe           Allergies   No Known Allergies

## 2022-08-17 NOTE — PLAN OF CARE
END OF SHIFT REPORT:For vital signs and complete assessments, please see documentation flowsheets.   Assessment: Afebrile, A&Ox4, cooperative, currently on room air, Denies SOB or chest pain, IS education completed when awake, encouraged, not on tele, on regular diet. Denies nausea. BM x2 during shift. Voiding, denies pain,PIV x1, SBA in going to the bathroom.     Major Shift Events:  K 3.0, phos 1.8, bilirubin trending down  Plan/ Intervention: Electrolytes correction See MAR                                 : Nutritionist consult re: Possibly refeeding syndrome                                 : Possible Discharge tomorrow pending medical ( electrolytes) Stability                                 : Recheck K at 2 pm-                                      Problem: Plan of Care - These are the overarching goals to be used throughout the patient stay.    Goal: Absence of Hospital-Acquired Illness or Injury  Intervention: Prevent Skin Injury  Recent Flowsheet Documentation  Taken 8/17/2022 0845 by Chandler Gasca, RN  Body Position: position changed independently     Problem: Plan of Care - These are the overarching goals to be used throughout the patient stay.    Goal: Plan of Care Review/Shift Note  Description: The Plan of Care Review/Shift note should be completed every shift.  The Outcome Evaluation is a brief statement about your assessment that the patient is improving, declining, or no change.  This information will be displayed automatically on your shift note.  Outcome: Ongoing, Progressing   Goal Outcome Evaluation:

## 2022-08-17 NOTE — PROGRESS NOTES
[facility]    Progress Note - [facility] Service       Date of Admission:  8/13/2022    Family Medicine Progress Note - [facility] Service       Main Plans for Today    - HIGH K and Phos replacement  - Ok to continue eating regular, full diet  - CIWA protocol with lorazepam - page [facility] team with CIWA >7  - Thiamine per protocol 500 mg IV TID    - Hepatology following   - Start prednisolone 40 mg daily  - Nutrition following    - Discuss concern for refeeding syndrome    Assessment & Plan      Uriel Soria is a 32 year old male with PMH of gout, ETOH abuse, cirrhosis. He was admitted 8/13/22 for severe ETOH withdrawal and alcoholic hepatitis. While waiting in ED for a medicine bed he clinically decompensated and became extremely agitated requiring IV benzodiazepines. He was upgraded to ICU level of care for IV BZDs and a precedex gtt. On 8/16 patient was no longer requiring IV benzodiazapine or dexmedetomidine; transferred to [facility] service.      # Severe ETOH withdrawal complicated with agitation and visual hallucinations consistent with delirium tremens  # Alcohol use disorder   # Anxiety   # Insomnia  Drinks whiskey 0.5-0.75L/day. Cessation of use 8/12. No hx of seizures. Has not been through treatment before. Presented with tremors and visual hallucinations which deteriorated to agitation and violent behaviors directed at staff. He was upgraded to ICU level of care at that time for IV benzos and precedex gtt. Presentation consistent with delirium tremens. Stabilized with low CIWA scoring, not requiring Precedex, as of 8/15 PM. Transferred to [facility] team 8/16 AM.  CIWA protocol was restarted. Chem dep and saw the patient on 8/15 for assessment and provided resources for CD treatment following discharge. Psych saw the patient 8/15 to discuss anxiety management. Patient is open to discussing medication options. Given recent decreased PO  intake and patient's long history of heavy EtOH use, review of literature suggests benefit in continuing high-dose IV thiamine for prevention of Wernicke's encephalopathy. Patient's wife notes she has made an appointment for CD assessment on Friday.  - CIWA w/ lorazepam given persistent tachycardia, agitation, anxiety  - Gabapentin tapered to 1800 mg daily for alcohol cessation  - Thiamine 500 mg IV TID  - Folate 1 mg QD  - Psych consulted, appreciate recs   - Mirtazapine 15 mg PO QD  - Chem Dep consulted, appreciate recs   - Resources provided - family making calls  - Melatonin 5 mg nightly PRN     # Decompensated ETOH cirrhosis with small volume ascites, hypoalbuminemia, and coagulation defect (MELD-Na+ 28 on admission)  # Hyperbilirubinemia with elevations in ALP and AST   # Thrombocytopenia likely in the setting of cirrhosis   # Normocytic anemia   # Coagulation defect, elevated INR in the setting of cirrhosis   # Small volume ascites  On admit, RUQ US w Doppler 8/13/22 revealed hepatomegaly with diffuse parenchymal echogenicity, reversal of flow throughout protal veins and splenic vein without thrombosis, splenomegaly, small volume ascites (therefore did not require paracentesis). Liver biopsy 6/21/21 (steatohepatitis) and MR Abd w/wo contrast 5/4/21 (see care everywhere). In June 2021, INR 1.28 and LFTs with total bili 5.9, , ALT 84, Alk Phos 111. Reports he has never had an EGD. S/p albumin load 8/15. No concern for acute GI bleed though his hgb is overall down-trending. Due to ongoing hepatitis and synthetic dysfunction, will start daily prednisolone per hepatology, who will continue to follow.      MELD-Na score: 28 at 8/17/2022  5:21 AM  MELD score: 27 at 8/17/2022  5:21 AM  Calculated from:  Serum Creatinine: 0.81 mg/dL (Using min of 1 mg/dL) at 8/17/2022  5:21 AM  Serum Sodium: 134 mmol/L at 8/17/2022  5:21 AM  Total Bilirubin: 21.4 mg/dL at 8/17/2022  5:21 AM  INR(ratio): 2.23 at 8/17/2022  5:21  "AM  Age: 32 years    - GI hepatology following, appreciate recs   - START Prednisolone 40 mg PO every day for alcoholic hepatitis   - \"Needs MELD labs at day 4 of steroids with PCP to calculate Lille score. If he is not a responder, steroids should be stopped\"   - Daily CBC  - MELD labs trending    # Refeeding syndrome   # Mild asymptomatic hyponatremia, resolved  # Hypokalemia  # Hypomagnesemia  # Hypophosphatemia   # Risk for hypoglycemia  # Risk for malnutrition   Persistently requiring IV potassium and phosphorus repletion as he advances his diet, concerning for refeeding syndrome. K as low as 2.7 on 8/16, improved to 3.6 later in the day, and again low to 3.0 on 8/17. Phos as low as 1.6 8/16 AM, normalized with repletion and low again to 1.8 8/17 AM. Will need to not be requiring consistent IV repletion prior to discharge.   - Phos replacement protocol - HIGH  - K replacement protocol- HIGH  - Mag replacement protocol- HIGH  - Hypoglycemia order set in place   - Daily CMP  - Nutrition consult, appreciate recs  - Diet per nutrition    - Pt is OK to maintain full, regular diet     # Acute kidney injury, resolved  Patient with a jump in his Cr morning of 8/15 from 0.59 to 1.34. US completed 8/14 without any concerns for hydronephrosis, mass. Patient is voiding per nursing staff and does not feel bladder distention or pain. Creatinine normalized as of 8/16.  - Daily CMP     # Cardiac murmur, best heard at R sternal border   # Sinus tachycardia, persistent, stable  Patient has been persistently tachycardic since arrival. 2/6 murmur was appreciated in the ED at the right sternal border. ECG and TTE in the ICU on 8/14 were normal  - Optimize electrolytes   - CTM      # Leukocytosis, resolved  # Rhinovirus  # Cough, stable  Rhinovirus detected on viral screens on 8/14. COVID neg on 8/13. Unable to perform paracentesis due to low volume to clear SBP, but little concern at this stage in his hospitalization.   - No " "interventions at this time        Diet: Regular Diet Adult, per nutrition      DVT Prophylaxis: Pneumatic Compression Devices  Butterfield Catheter: Not present  Fluids: PO  Central Lines: None  Cardiac Monitoring: None  Code Status: Full Code      Disposition Plan      Expected Discharge Date: 08/18/2022,  3:00 PM      Discharge Comments: Needs to be able to maintain a full diet and not require IV electrolyte repletion prior to discharge.        The patient's care was discussed with the Attending Physician, Dr. Oquendo.    Wayne Santacruz, MS4, [employer]       Resident/Fellow Attestation   I, Haylie Hedrick MD, was present with the medical/BRIAN student who participated in the service and in the documentation of the note.  I have verified the history and personally performed the physical exam and medical decision making.  I agree with the assessment and plan of care as documented in the note.        Haylie Hedrick MD  PGY2  Date of Service (when I saw the patient): 08/17/22  [facility]  Securely message with the Vocera Web Console (learn more here)  Text page via [facility] Paging/Directory   Please see signed in provider for up to date coverage information    Clinically Significant Risk Factors Present on Admission               # Obesity: Estimated body mass index is 30.02 kg/m  as calculated from the following:    Height as of this encounter: 1.753 m (5' 9\").    Weight as of this encounter: 92.2 kg (203 lb 4.2 oz).      ______________________________________________________________________    Interval History    Overnight, patient reports he slept intermittently but not as well as the previous night. Notably, he was not given melatonin. He states he has had no new symptoms and has continued to have CIWA scores of 0 since re-initiation of the protocol.     In discussion with patient's wife, she notes she has arranged for a CD assessment on 8/19 " at a treatment organization near their home. She has also arranged for PCP follow-up early next week at a  clinic ([facility]). Questions were elicited and answered.       Data reviewed today: I reviewed all medications, new labs and imaging results over the last 24 hours. I personally reviewed no images or EKG's today.    Physical Exam   Vital Signs: Temp: 98.6  F (37  C) Temp src: Oral BP: 127/79 Pulse: 112   Resp: 18 SpO2: 96 % O2 Device: None (Room air)    Weight: 203 lbs 4.23 oz  Physical Exam  Constitutional:       General: He is not in acute distress.     Appearance: He is ill-appearing.   HENT:      Head: Normocephalic and atraumatic.      Mouth/Throat:      Mouth: Mucous membranes are moist.   Eyes:      General: Scleral icterus present.      Extraocular Movements: Extraocular movements intact.   Cardiovascular:      Rate and Rhythm: Regular rhythm. Tachycardia present.      Heart sounds: Normal heart sounds.   Pulmonary:      Effort: Pulmonary effort is normal.      Breath sounds: Normal breath sounds. No wheezing, rhonchi or rales.   Abdominal:      General: Bowel sounds are normal.      Tenderness: There is no abdominal tenderness.   Musculoskeletal:      Cervical back: Normal range of motion. No tenderness.      Right lower leg: Edema (to the ankle) present.      Left lower leg: Edema (to the ankle) present.      Comments: edema present to bilateral feet   Skin:     Coloration: Skin is jaundiced.   Neurological:      Mental Status: He is alert and oriented to person, place, and time.   Psychiatric:         Behavior: Behavior normal.         Cognition and Memory: Cognition is impaired (mild).       Data    Recent Labs   Lab 08/17/22  0521 08/16/22  1956 08/16/22  1204 08/16/22  0426 08/16/22  0239 08/15/22  1941 08/15/22  1619 08/15/22  0957 08/15/22  0531 08/14/22  0430 08/14/22  0414   WBC 8.2  --   --  9.7  --   --   --   --  8.5  --  9.4   HGB 7.8*  --   --  8.0*  --   --   --   --  8.7*  --  8.5*    MCV 90  --   --  89  --   --   --   --  89  --  86     --   --  145*  --   --   --   --  148*  --  124*   INR 2.23*  --   --  2.23*  --   --   --   --  1.84*  --  1.97*     --   --  133  --   --  131*   < > 132*  --  133   POTASSIUM 3.0* 3.6 3.2* 2.7*  --   --  3.4   < > 3.1*   < > 3.2*   CHLORIDE 105  --   --  101  --   --  99   < > 99  --  99   CO2 22  --   --  24  --   --  25   < > 23  --  26   BUN 13  --   --  16  --   --  19   < > 18  --  9   CR 0.81  --   --  1.03  --   --  1.28*   < > 1.34*  --  0.59*   ANIONGAP 7  --   --  8  --   --  7   < > 10  --  8   ARIES 7.6*  --   --  8.0*  --   --  8.1*   < > 8.1*  --  7.7*   *  --   --  134* 100*   < > 95   < > 88   < > 96   ALBUMIN 2.3*  --   --  2.6*  --   --  2.2*  --  2.0*  --  2.4*   PROTTOTAL 6.3*  --   --  6.6*  --   --  6.9  --  6.2*  --  6.9   BILITOTAL 21.4*  --   --  22.5*  --   --  22.7*  --  20.3*  --  18.7*   ALKPHOS 136  --   --  148  --   --  180*  --  166*  --  187*   ALT 29  --   --  31  --   --  37  --  36  --  37   *  --   --  148*  --   --  179*  --  169*  --  183*   LIPASE  --   --   --   --   --   --   --   --   --   --  243    < > = values in this interval not displayed.     No results found for this or any previous visit (from the past 24 hour(s)).

## 2022-08-17 NOTE — PROGRESS NOTES
Vitamin B1 dose not available, writer called main pharmacy.We are preparing the dose right now, will send it  soon  to the unit per pharmacy staff.

## 2022-08-17 NOTE — PROGRESS NOTES
Transferred to: Other 6A at 1445 hrs  hrs , Report received by Bry @ 1350hrs RN , Transport team was called at 1400 hrs   Belongings: with the patient  Butterfield removed? No: No Butterfield  Central line removed? NA  Chart and medications sent with patient Yes  Family notified: Yes

## 2022-08-17 NOTE — PLAN OF CARE
Goal Outcome Evaluation:    Plan of Care Reviewed With: patient     Overall Patient Progress: improving    VS:       Pt A/O X 4. Afebrile. Lung  Sounds clear bilaterally with both       anterior and posterior. Denies nausea, shortness of breath, or chest pain. Pt abdomen is distended.  Temp: 98.5  F (36.9  C) Temp src: Oral BP: 111/68 Pulse: 103   Resp: 18 SpO2: 98 % O2 Device: None (Room air)     Output:       Bowel is active  in all four quadrants. Voids spontaneously without   difficulty in the toilet. LBM 08/17/22 per pt.     Activity:     Independent     Skin: Whole skin, eyes yellow color,bruises on L.arm.   Pain:     Denied pain.     CMS:       CMS and Neuro's are intact. Denies numbness and tingling in all extremities.   Dressing:     None   Diet:       Pt is on a  Regular diet and appetite was adequate in this shift.       LDA:       PIV on right forearm is patent and no infiltration.     Equipment:     None   Plan:       Pt is able to make needs known and the call light is within the pt's reach. Continue to monitor.    Pt will be discharged tomorrow.   Additional Info:

## 2022-08-17 NOTE — PROGRESS NOTES
Brief Hepatology Follow Up Note    08/17/22    History obtained via chart review, patient at [facility].     Chart, vitals, labs reviewed.     MELD-Na score: 28 at 8/17/2022  5:21 AM  MELD score: 27 at 8/17/2022  5:21 AM  Calculated from:  Serum Creatinine: 0.81 mg/dL (Using min of 1 mg/dL) at 8/17/2022  5:21 AM  Serum Sodium: 134 mmol/L at 8/17/2022  5:21 AM  Total Bilirubin: 21.4 mg/dL at 8/17/2022  5:21 AM  INR(ratio): 2.23 at 8/17/2022  5:21 AM  Age: 32 years    Interval events:  - creatinine improved with albumin, bilirubin remains elevated  - infectious work up seems complete and is negative    Recommendations:  -- recommend starting PO 40 mg prednisolone daily today for alcohol-related hepatitis   -- trend MELD labs daily, needs MELD labs at day 4 of steroids with PCP to calculate Lille score. If he is not a responder, steroids should be stopped.   -- would prefer another day of inpatient monitoring from hepatology perspective  -- agree with thiamine, folate, and all other supportive cares per primary team  -- hepatology will continue to follow though discharge, reach out with any questions or concerns    Ayden Navarro MD, PGY5  Gastroenterology Fellow  [facility]  See AMCOM/[facility] for GI on-call information    Case discussed with hepatology staff Dr. Almazan.

## 2022-08-17 NOTE — PROVIDER NOTIFICATION
Notified Resident at Haylie Hedrick at 3 pm  PM regarding MRN:  [id number] patient from  room 1037 transferred to . Upon ambulation from bed to bathroom patient noted edema on BLE. which was not significant in the morning. For Follow up patient is at [address]..      Spoke with: SCOTT Hedrick    Orders Page sent.    Comments: pending orders

## 2022-08-18 LAB
ALBUMIN SERPL-MCNC: 2.3 G/DL (ref 3.4–5)
ALP SERPL-CCNC: 155 U/L (ref 40–150)
ALT SERPL W P-5'-P-CCNC: 33 U/L (ref 0–70)
ANION GAP SERPL CALCULATED.3IONS-SCNC: 5 MMOL/L (ref 3–14)
ANION GAP SERPL CALCULATED.3IONS-SCNC: 5 MMOL/L (ref 3–14)
AST SERPL W P-5'-P-CCNC: 104 U/L (ref 0–45)
BACTERIA BLD CULT: NO GROWTH
BACTERIA BLD CULT: NO GROWTH
BILIRUB DIRECT SERPL-MCNC: 18.5 MG/DL (ref 0–0.2)
BILIRUB SERPL-MCNC: 23.6 MG/DL (ref 0.2–1.3)
BUN SERPL-MCNC: 13 MG/DL (ref 7–30)
BUN SERPL-MCNC: 13 MG/DL (ref 7–30)
CALCIUM SERPL-MCNC: 7.8 MG/DL (ref 8.5–10.1)
CALCIUM SERPL-MCNC: 8 MG/DL (ref 8.5–10.1)
CHLORIDE BLD-SCNC: 107 MMOL/L (ref 94–109)
CHLORIDE BLD-SCNC: 107 MMOL/L (ref 94–109)
CO2 SERPL-SCNC: 24 MMOL/L (ref 20–32)
CO2 SERPL-SCNC: 25 MMOL/L (ref 20–32)
CREAT SERPL-MCNC: 0.97 MG/DL (ref 0.66–1.25)
CREAT SERPL-MCNC: 1.04 MG/DL (ref 0.66–1.25)
ERYTHROCYTE [DISTWIDTH] IN BLOOD BY AUTOMATED COUNT: 21.2 % (ref 10–15)
GFR SERPL CREATININE-BSD FRML MDRD: >90 ML/MIN/1.73M2
GFR SERPL CREATININE-BSD FRML MDRD: >90 ML/MIN/1.73M2
GLUCOSE BLD-MCNC: 107 MG/DL (ref 70–99)
GLUCOSE BLD-MCNC: 120 MG/DL (ref 70–99)
HCT VFR BLD AUTO: 28 % (ref 40–53)
HGB BLD-MCNC: 8.7 G/DL (ref 13.3–17.7)
INR PPP: 2.03 (ref 0.85–1.15)
MAGNESIUM SERPL-MCNC: 2.2 MG/DL (ref 1.6–2.3)
MAGNESIUM SERPL-MCNC: 2.4 MG/DL (ref 1.6–2.3)
MCH RBC QN AUTO: 28.8 PG (ref 26.5–33)
MCHC RBC AUTO-ENTMCNC: 31.1 G/DL (ref 31.5–36.5)
MCV RBC AUTO: 93 FL (ref 78–100)
PHOSPHATE SERPL-MCNC: 2.1 MG/DL (ref 2.5–4.5)
PHOSPHATE SERPL-MCNC: 2.3 MG/DL (ref 2.5–4.5)
PLATELET # BLD AUTO: 211 10E3/UL (ref 150–450)
POTASSIUM BLD-SCNC: 3.7 MMOL/L (ref 3.4–5.3)
POTASSIUM BLD-SCNC: 4.3 MMOL/L (ref 3.4–5.3)
PROT SERPL-MCNC: 6.9 G/DL (ref 6.8–8.8)
RBC # BLD AUTO: 3.02 10E6/UL (ref 4.4–5.9)
SODIUM SERPL-SCNC: 136 MMOL/L (ref 133–144)
SODIUM SERPL-SCNC: 137 MMOL/L (ref 133–144)
VIT B1 PYROPHOSHATE BLD-SCNC: 148 NMOL/L
WBC # BLD AUTO: 8.3 10E3/UL (ref 4–11)

## 2022-08-18 PROCEDURE — 120N000002 HC R&B MED SURG/OB UMMC

## 2022-08-18 PROCEDURE — 83735 ASSAY OF MAGNESIUM: CPT | Performed by: FAMILY MEDICINE

## 2022-08-18 PROCEDURE — 250N000011 HC RX IP 250 OP 636: Performed by: STUDENT IN AN ORGANIZED HEALTH CARE EDUCATION/TRAINING PROGRAM

## 2022-08-18 PROCEDURE — 82374 ASSAY BLOOD CARBON DIOXIDE: CPT | Performed by: FAMILY MEDICINE

## 2022-08-18 PROCEDURE — 250N000012 HC RX MED GY IP 250 OP 636 PS 637: Performed by: STUDENT IN AN ORGANIZED HEALTH CARE EDUCATION/TRAINING PROGRAM

## 2022-08-18 PROCEDURE — 250N000013 HC RX MED GY IP 250 OP 250 PS 637: Performed by: STUDENT IN AN ORGANIZED HEALTH CARE EDUCATION/TRAINING PROGRAM

## 2022-08-18 PROCEDURE — 258N000003 HC RX IP 258 OP 636: Performed by: STUDENT IN AN ORGANIZED HEALTH CARE EDUCATION/TRAINING PROGRAM

## 2022-08-18 PROCEDURE — 84100 ASSAY OF PHOSPHORUS: CPT | Performed by: STUDENT IN AN ORGANIZED HEALTH CARE EDUCATION/TRAINING PROGRAM

## 2022-08-18 PROCEDURE — 250N000013 HC RX MED GY IP 250 OP 250 PS 637: Performed by: PSYCHIATRY & NEUROLOGY

## 2022-08-18 PROCEDURE — 36415 COLL VENOUS BLD VENIPUNCTURE: CPT | Performed by: STUDENT IN AN ORGANIZED HEALTH CARE EDUCATION/TRAINING PROGRAM

## 2022-08-18 PROCEDURE — 36415 COLL VENOUS BLD VENIPUNCTURE: CPT | Performed by: FAMILY MEDICINE

## 2022-08-18 PROCEDURE — 82947 ASSAY GLUCOSE BLOOD QUANT: CPT | Performed by: FAMILY MEDICINE

## 2022-08-18 PROCEDURE — 250N000009 HC RX 250: Performed by: STUDENT IN AN ORGANIZED HEALTH CARE EDUCATION/TRAINING PROGRAM

## 2022-08-18 PROCEDURE — 93010 ELECTROCARDIOGRAM REPORT: CPT | Performed by: INTERNAL MEDICINE

## 2022-08-18 PROCEDURE — 250N000013 HC RX MED GY IP 250 OP 250 PS 637: Performed by: NURSE PRACTITIONER

## 2022-08-18 PROCEDURE — 84100 ASSAY OF PHOSPHORUS: CPT | Performed by: FAMILY MEDICINE

## 2022-08-18 PROCEDURE — 85027 COMPLETE CBC AUTOMATED: CPT | Performed by: FAMILY MEDICINE

## 2022-08-18 PROCEDURE — 99233 SBSQ HOSP IP/OBS HIGH 50: CPT | Mod: GC | Performed by: FAMILY MEDICINE

## 2022-08-18 PROCEDURE — 83735 ASSAY OF MAGNESIUM: CPT | Performed by: STUDENT IN AN ORGANIZED HEALTH CARE EDUCATION/TRAINING PROGRAM

## 2022-08-18 PROCEDURE — 82248 BILIRUBIN DIRECT: CPT | Performed by: FAMILY MEDICINE

## 2022-08-18 PROCEDURE — 85610 PROTHROMBIN TIME: CPT | Performed by: FAMILY MEDICINE

## 2022-08-18 PROCEDURE — 250N000013 HC RX MED GY IP 250 OP 250 PS 637

## 2022-08-18 RX ORDER — FOLIC ACID 1 MG/1
1 TABLET ORAL DAILY
Qty: 30 TABLET | Refills: 0 | Status: CANCELLED | OUTPATIENT
Start: 2022-08-19 | End: 2022-09-18

## 2022-08-18 RX ORDER — MULTIPLE VITAMINS W/ MINERALS TAB 9MG-400MCG
1 TAB ORAL DAILY
Qty: 30 TABLET | Refills: 0 | Status: CANCELLED | OUTPATIENT
Start: 2022-08-19 | End: 2022-09-18

## 2022-08-18 RX ORDER — LANOLIN ALCOHOL/MO/W.PET/CERES
100 CREAM (GRAM) TOPICAL DAILY
Qty: 30 TABLET | Refills: 0 | Status: CANCELLED | OUTPATIENT
Start: 2022-08-18 | End: 2022-09-17

## 2022-08-18 RX ORDER — PREDNISOLONE 15 MG/5 ML
40 SOLUTION, ORAL ORAL DAILY
Qty: 345.8 ML | Refills: 0 | Status: CANCELLED | OUTPATIENT
Start: 2022-08-19 | End: 2022-09-14

## 2022-08-18 RX ORDER — GABAPENTIN 300 MG/1
300 CAPSULE ORAL 3 TIMES DAILY
Qty: 90 CAPSULE | Refills: 0 | Status: CANCELLED | OUTPATIENT
Start: 2022-08-18 | End: 2022-09-17

## 2022-08-18 RX ORDER — LANOLIN ALCOHOL/MO/W.PET/CERES
3 CREAM (GRAM) TOPICAL
Qty: 30 TABLET | Refills: 0 | Status: CANCELLED | OUTPATIENT
Start: 2022-08-18 | End: 2022-09-17

## 2022-08-18 RX ORDER — MIRTAZAPINE 15 MG/1
15 TABLET, FILM COATED ORAL AT BEDTIME
Qty: 30 TABLET | Refills: 0 | Status: CANCELLED | OUTPATIENT
Start: 2022-08-18 | End: 2022-09-17

## 2022-08-18 RX ADMIN — GABAPENTIN 300 MG: 300 CAPSULE ORAL at 09:46

## 2022-08-18 RX ADMIN — THIAMINE HYDROCHLORIDE 500 MG: 100 INJECTION, SOLUTION INTRAMUSCULAR; INTRAVENOUS at 17:38

## 2022-08-18 RX ADMIN — GABAPENTIN 300 MG: 300 CAPSULE ORAL at 21:59

## 2022-08-18 RX ADMIN — SENNOSIDES AND DOCUSATE SODIUM 2 TABLET: 50; 8.6 TABLET ORAL at 09:46

## 2022-08-18 RX ADMIN — MULTIPLE VITAMINS W/ MINERALS TAB 1 TABLET: TAB at 09:46

## 2022-08-18 RX ADMIN — GABAPENTIN 300 MG: 300 CAPSULE ORAL at 15:15

## 2022-08-18 RX ADMIN — MIRTAZAPINE 15 MG: 7.5 TABLET, FILM COATED ORAL at 21:59

## 2022-08-18 RX ADMIN — THIAMINE HYDROCHLORIDE 500 MG: 100 INJECTION, SOLUTION INTRAMUSCULAR; INTRAVENOUS at 13:44

## 2022-08-18 RX ADMIN — SODIUM PHOSPHATE, MONOBASIC, MONOHYDRATE AND SODIUM PHOSPHATE, DIBASIC, ANHYDROUS 15 MMOL: 276; 142 INJECTION, SOLUTION INTRAVENOUS at 14:35

## 2022-08-18 RX ADMIN — Medication 5 MG: at 21:59

## 2022-08-18 RX ADMIN — Medication 1 LOZENGE: at 21:59

## 2022-08-18 RX ADMIN — THIAMINE HYDROCHLORIDE 500 MG: 100 INJECTION, SOLUTION INTRAMUSCULAR; INTRAVENOUS at 02:25

## 2022-08-18 RX ADMIN — FOLIC ACID 1 MG: 1 TABLET ORAL at 09:46

## 2022-08-18 RX ADMIN — PREDNISOLONE SODIUM PHOSPHATE 40 MG: 15 SOLUTION ORAL at 09:55

## 2022-08-18 RX ADMIN — SODIUM PHOSPHATE, MONOBASIC, MONOHYDRATE AND SODIUM PHOSPHATE, DIBASIC, ANHYDROUS 15 MMOL: 276; 142 INJECTION, SOLUTION INTRAVENOUS at 21:59

## 2022-08-18 ASSESSMENT — ACTIVITIES OF DAILY LIVING (ADL)
ADLS_ACUITY_SCORE: 20

## 2022-08-18 NOTE — PROGRESS NOTES
CLINICAL NUTRITION SERVICES - BRIEF NOTE   (See RD note on 8/14 for full assessment)     Reason for RD note:   Nutrition education - per provider request     New Findings/Chart Review:  Patient likely with some refeeding given low K and Phos labs requiring aggressive replacement during stay. Discussed lab trends with patient and encouraged patient to choose high potassium and phosphorus foods at discharge. Provided with list of potassium and phosphorus rich foods. Patient states he has a garden at home with lots of fresh vegetables. Also encouraged small frequent meals, eating consistently.      Interventions:  Collaboration with other providers - Medicine student  Nutrition Education - electrolytes and high/low potassium/phosphorus foods to focus on at discharge.      Future/Additional Recommendations:  Monitor electrolytes   Monitor need for further education      Nutrition will continue to follow per protocol.    Shahana Hatfield MS, RDN, LDN  RD pager: [phone]

## 2022-08-18 NOTE — PROGRESS NOTES
"  VS: /83 (BP Location: Left arm, Patient Position: Supine)   Pulse 103   Temp 98.2  F (36.8  C) (Oral)   Resp 16   Ht 1.753 m (5' 9\")   Wt 92.2 kg (203 lb 4.2 oz)   SpO2 97%   BMI 30.02 kg/m       O2: 97%   Output: Voiding without difficulty    Last BM: 8/18   Activity: Up ad memo    Skin: See flowsheet, bruising on arms and hands    Pain: Pt denies pain at this time    CMS: A/O x4    Dressing: CDI    Diet: Regular thin liquid    LDA: R and L PIV    Plan: Continue with care    Additional Info:          "

## 2022-08-18 NOTE — PLAN OF CARE
Goal Outcome Evaluation:  Alert and oriented X 4. Able to make needs known. Call light in reach. VSS. Afebrile. Maintaining O2 sats in mid 90s on room air. Infrequent non productive cough.  Jaundiced. Abdominal distended,, CIWA 0. Offers no C/O pain. Up ad memo in room. Continent of bowel and bladder. PIV patent, Infusing without difficulty. Denies nausea, headache, dizziness, lightheadedness,chest pain or SOB. Appears to be sleeping during rounds. Droplet precautions maintained. Continue with plan of care.

## 2022-08-18 NOTE — PROGRESS NOTES
[facility]    Progress Note - [facility] Service       Date of Admission:  8/13/2022    [facility] Medicine Progress Note - [facility] Service       Main Plans for Today      - HIGH Phos replacement  - Ok to continue eating regular, full diet  - Discontinue CIWA protocol  - Thiamine per protocol 500 mg IV TID    - Hepatology following   - continue prednisolone 40 mg daily    Assessment & Plan      Uriel Soria is a 32 year old male with PMH of gout, ETOH abuse, cirrhosis. He was admitted 8/13/22 for severe ETOH withdrawal and alcoholic hepatitis. While waiting in ED for a medicine bed he clinically decompensated and became extremely agitated requiring IV benzodiazepines. He was upgraded to ICU level of care for IV BZDs and a precedex gtt. On 8/16 patient was no longer requiring IV benzodiazapine or dexmedetomidine; transferred to [facility] service.    # Severe ETOH withdrawal complicated with agitation and visual hallucinations consistent with delirium tremens  # Alcohol use disorder   # Anxiety   # Insomnia  Drinks whiskey 0.5-0.75L/day. Cessation of use 8/12. No hx of seizures. Has not been through treatment before. Presented with tremors and visual hallucinations which deteriorated to agitation and violent behaviors directed at staff. He was upgraded to ICU level of care at that time for IV benzos and precedex gtt. Presentation consistent with delirium tremens. Stabilized with low CIWA scoring, not requiring Precedex, as of 8/15 PM. Transferred to [facility]'s team 8/16 AM.  CIWA protocol was restarted. Chem dep and saw the patient on 8/15 for assessment and provided resources for CD treatment following discharge. Psych saw the patient 8/15 to discuss anxiety management. Given recent decreased PO intake and patient's long history of heavy EtOH use, review of literature suggests benefit in continuing high-dose IV thiamine for prevention of Wernicke's  "encephalopathy. Patient's wife notes she has made an appointment for CD assessment on Friday. CIWA protocol discontinued as scores of 0 for 72 hrs, no scores > 7 since 8/14 07:00.   - Discontinue CIWA    - Gabapentin 1.8g PO QD  - Thiamine 500 mg IV TID  - Folate 1 mg QD  - Psych consulted, appreciate recs   - Mirtazapine 15 mg PO QD  - Chem Dep consulted, appreciate recs   - Resources provided - assessment scheduled for 8/19  - Melatonin 5 mg nightly PRN     # Decompensated ETOH cirrhosis with small volume ascites, hypoalbuminemia, and coagulation defect (MELD-Na+ 28 on admission)  # Hyperbilirubinemia with elevations in ALP and AST   # Thrombocytopenia likely in the setting of cirrhosis   # Normocytic anemia   # Coagulation defect, elevated INR in the setting of cirrhosis   # Small volume ascites  On admit, RUQ US w Doppler 8/13/22 revealed hepatomegaly with diffuse parenchymal echogenicity, reversal of flow throughout protal veins and splenic vein without thrombosis, splenomegaly, small volume ascites (therefore did not require paracentesis). Liver biopsy 6/21/21 (steatohepatitis) and MR Abd w/wo contrast 5/4/21 (see care everywhere). In June 2021, INR 1.28 and LFTs with total bili 5.9, , ALT 84, Alk Phos 111. Reports he has never had an EGD. S/p albumin load 8/15. No concern for acute GI bleed though his hgb is overall down-trending. Due to ongoing hepatitis and synthetic dysfunction, started daily prednisolone per hepatology, who will continue to follow.      MELD-Na score: 26 at 8/18/2022  9:15 AM  MELD score: 26 at 8/18/2022  9:15 AM  Calculated from:  Serum Creatinine: 1.04 mg/dL at 8/18/2022  9:15 AM  Serum Sodium: 137 mmol/L at 8/18/2022  9:15 AM  Total Bilirubin: 23.6 mg/dL at 8/18/2022  9:15 AM  INR(ratio): 2.03 at 8/18/2022  9:15 AM  Age: 32 years    - GI hepatology following, appreciate recs   - Prednisolone 40 mg PO QD for alcoholic hepatitis (day 2)   - \"Needs MELD labs at day 4 of steroids " "with PCP to calculate Lille score. If he is not a responder, steroids should be stopped\"   - Daily CBC  - MELD labs trending    # Refeeding syndrome   # Mild asymptomatic hyponatremia, resolved  # Hypokalemia, resolved  # Hypomagnesemia, resolved  # Hypophosphatemia   # Risk for hypoglycemia  # Risk for malnutrition   Persistently requiring IV potassium and phosphorus repletion as he advances his diet, concerning for refeeding syndrome. K as low as 2.7 on 8/16, stable at 3.7 after multiple replacements. Phos as low as 1.6 8/16 AM, will normalize with repletion, then dip again. Will need to not be requiring consistent IV repletion prior to discharge.   - 15 mmol Na-Phos   - with repeat phos lab work 2 hrs after replacement is finished  - Hypoglycemia order set in place   - Daily CMP  - Nutrition consult, appreciate recs  - Diet per nutrition    - Pt is OK to maintain full, regular diet     # Bilateral lower extremity edema  Patient has had swelling of the feet and ankles since admission. Likely secondary to low albumin. S/p albumin repletion 8/15. As of 8/18 patient states it is slowly resolving.   - Ordered compression stockings    # Acute kidney injury, resolved  Patient with a jump in his Cr 8/15 from 0.59 to 1.34. US completed 8/14 without any concerns for hydronephrosis, mass. Patient is voiding per nursing staff and does not feel bladder distention or pain. Creatinine normalized as of 8/16.  - Daily CMP     # Cardiac murmur, best heard at R sternal border   # Sinus tachycardia, persistent, stable  Patient has been persistently tachycardic since arrival. 2/6 murmur was appreciated in the ED at the right sternal border. ECG and TTE in the ICU on 8/14 were normal. Repeat ECG 8/18 with no changes, unconcerning.   - Optimize electrolytes   - CTM      # Leukocytosis, resolved  # Rhinovirus  # Cough, stable  Rhinovirus detected on viral screens on 8/14. COVID neg on 8/13. Unable to perform paracentesis due to low " volume to clear SBP, but little concern at this stage in his hospitalization.   - No interventions at this time        Diet: Regular Diet Adult, per nutrition      DVT Prophylaxis: Pneumatic Compression Devices  [place] Catheter: Not present  Fluids: PO  Central Lines: None  Cardiac Monitoring: None  Code Status: Full Code      Disposition Plan           The patient's care was discussed with the Attending Physician, Dr. Oquendo.    Wayne Santacruz, MS4, [employer]       Resident/Fellow Attestation   Resident/Fellow Attestation   I, Alec Templeton MD, was present with the medical/BRIAN student who participated in the service and in the documentation of the note.  I have verified the history and personally performed the physical exam and medical decision making.  I agree with the assessment and plan of care as documented in the note.      Alec Templeton MD  PGY3      Date of Service (when I saw the patient): 08/17/22  [facility]  Securely message with the Vocera Web Console (learn more here)  Text page via [facility] Paging/Directory   Please see signed in provider for up to date coverage information    Clinically Significant Risk Factors Present on Admission                   ______________________________________________________________________    Interval History    Yesterday evening patient transferred to 6th floor med-surg. Yesterday patient had further questions regarding the seriousness of his condition and his prognosis. Answered patient's questions and strongly advised alcohol cessation and CD treatment as well as close follow-up with PCP.     Today. patient reports no new symptoms since initiation of prednisolone yesterday evening. He believes the edema in his feet and ankles is slowly improving. He is open to trying compression stockings to further reduce swelling.      Data reviewed today: I reviewed all medications, new labs and  imaging results over the last 24 hours. I personally reviewed the EKG tracing showing no change from previous, ECG is not concerning.     Physical Exam   Vital Signs: Temp: 98.2  F (36.8  C) Temp src: Oral BP: 126/83 Pulse: 103   Resp: 16 SpO2: 97 % O2 Device: None (Room air)    Weight: 203 lbs 4.23 oz  Physical Exam  Constitutional:       General: He is not in acute distress.     Appearance: He is ill-appearing.   HENT:      Head: Normocephalic and atraumatic.      Mouth/Throat:      Mouth: Mucous membranes are moist.   Eyes:      General: Scleral icterus present.      Extraocular Movements: Extraocular movements intact.   Cardiovascular:      Rate and Rhythm: Regular rhythm. Tachycardia present.      Heart sounds: Normal heart sounds.   Pulmonary:      Effort: Pulmonary effort is normal.      Breath sounds: Normal breath sounds. No wheezing, rhonchi or rales.   Abdominal:      General: Bowel sounds are normal.      Tenderness: There is no abdominal tenderness.   Musculoskeletal:      Cervical back: Normal range of motion. No tenderness.      Right lower leg: Edema (to the ankle) present.      Left lower leg: Edema (to the ankle) present.      Comments: edema present to bilateral feet   Skin:     Coloration: Skin is jaundiced.   Neurological:      Mental Status: He is alert and oriented to person, place, and time.   Psychiatric:         Behavior: Behavior normal.         Cognition and Memory: Cognition is impaired (mild).       Data    Recent Labs   Lab 08/18/22  0915 08/17/22  1405 08/17/22  0521 08/16/22  1204 08/16/22  0426 08/14/22  0430 08/14/22  0414   WBC 8.3  --  8.2  --  9.7   < > 9.4   HGB 8.7*  --  7.8*  --  8.0*   < > 8.5*   MCV 93  --  90  --  89   < > 86     --  152  --  145*   < > 124*   INR 2.03*  --  2.23*  --  2.23*   < > 1.97*     --  134  --  133   < > 133   POTASSIUM 3.7 3.7 3.0*   < > 2.7*   < > 3.2*   CHLORIDE 107  --  105  --  101   < > 99   CO2 25  --  22  --  24   < > 26   BUN  13  --  13  --  16   < > 9   CR 1.04  --  0.81  --  1.03   < > 0.59*   ANIONGAP 5  --  7  --  8   < > 8   ARIES 8.0*  --  7.6*  --  8.0*   < > 7.7*   *  --  102*  --  134*   < > 96   ALBUMIN 2.3*  --  2.3*  --  2.6*   < > 2.4*   PROTTOTAL 6.9  --  6.3*  --  6.6*   < > 6.9   BILITOTAL 23.6*  --  21.4*  --  22.5*   < > 18.7*   ALKPHOS 155*  --  136  --  148   < > 187*   ALT 33  --  29  --  31   < > 37   *  --  107*  --  148*   < > 183*   LIPASE  --   --   --   --   --   --  243    < > = values in this interval not displayed.     No results found for this or any previous visit (from the past 24 hour(s)).

## 2022-08-19 VITALS
RESPIRATION RATE: 16 BRPM | BODY MASS INDEX: 30.11 KG/M2 | HEART RATE: 110 BPM | TEMPERATURE: 98.5 F | HEIGHT: 69 IN | DIASTOLIC BLOOD PRESSURE: 81 MMHG | OXYGEN SATURATION: 96 % | WEIGHT: 203.26 LBS | SYSTOLIC BLOOD PRESSURE: 121 MMHG

## 2022-08-19 PROBLEM — E83.39 HYPOPHOSPHATEMIA: Status: ACTIVE | Noted: 2022-08-19

## 2022-08-19 PROBLEM — F41.9 ANXIETY: Status: ACTIVE | Noted: 2022-08-19

## 2022-08-19 LAB
ALBUMIN SERPL-MCNC: 2.2 G/DL (ref 3.4–5)
ALP SERPL-CCNC: 164 U/L (ref 40–150)
ALT SERPL W P-5'-P-CCNC: 45 U/L (ref 0–70)
ANION GAP SERPL CALCULATED.3IONS-SCNC: 7 MMOL/L (ref 3–14)
AST SERPL W P-5'-P-CCNC: 134 U/L (ref 0–45)
ATRIAL RATE - MUSE: 101 BPM
BILIRUB DIRECT SERPL-MCNC: 15.4 MG/DL (ref 0–0.2)
BILIRUB SERPL-MCNC: 21.2 MG/DL (ref 0.2–1.3)
BUN SERPL-MCNC: 12 MG/DL (ref 7–30)
CALCIUM SERPL-MCNC: 7.8 MG/DL (ref 8.5–10.1)
CHLORIDE BLD-SCNC: 107 MMOL/L (ref 94–109)
CO2 SERPL-SCNC: 22 MMOL/L (ref 20–32)
CREAT SERPL-MCNC: 0.78 MG/DL (ref 0.66–1.25)
DIASTOLIC BLOOD PRESSURE - MUSE: NORMAL MMHG
GFR SERPL CREATININE-BSD FRML MDRD: >90 ML/MIN/1.73M2
GLUCOSE BLD-MCNC: 88 MG/DL (ref 70–99)
INTERPRETATION ECG - MUSE: NORMAL
MAGNESIUM SERPL-MCNC: 2 MG/DL (ref 1.6–2.3)
P AXIS - MUSE: 37 DEGREES
PHOSPHATE SERPL-MCNC: 1.8 MG/DL (ref 2.5–4.5)
POTASSIUM BLD-SCNC: 3.4 MMOL/L (ref 3.4–5.3)
PR INTERVAL - MUSE: 140 MS
PROT SERPL-MCNC: 6.6 G/DL (ref 6.8–8.8)
QRS DURATION - MUSE: 88 MS
QT - MUSE: 406 MS
QTC - MUSE: 526 MS
R AXIS - MUSE: 34 DEGREES
SODIUM SERPL-SCNC: 136 MMOL/L (ref 133–144)
SYSTOLIC BLOOD PRESSURE - MUSE: NORMAL MMHG
T AXIS - MUSE: 33 DEGREES
VENTRICULAR RATE- MUSE: 101 BPM

## 2022-08-19 PROCEDURE — 36415 COLL VENOUS BLD VENIPUNCTURE: CPT | Performed by: STUDENT IN AN ORGANIZED HEALTH CARE EDUCATION/TRAINING PROGRAM

## 2022-08-19 PROCEDURE — 250N000013 HC RX MED GY IP 250 OP 250 PS 637: Performed by: NURSE PRACTITIONER

## 2022-08-19 PROCEDURE — 99238 HOSP IP/OBS DSCHRG MGMT 30/<: CPT | Mod: GC | Performed by: FAMILY MEDICINE

## 2022-08-19 PROCEDURE — 80053 COMPREHEN METABOLIC PANEL: CPT | Performed by: STUDENT IN AN ORGANIZED HEALTH CARE EDUCATION/TRAINING PROGRAM

## 2022-08-19 PROCEDURE — 250N000013 HC RX MED GY IP 250 OP 250 PS 637: Performed by: PSYCHIATRY & NEUROLOGY

## 2022-08-19 PROCEDURE — 84100 ASSAY OF PHOSPHORUS: CPT | Performed by: STUDENT IN AN ORGANIZED HEALTH CARE EDUCATION/TRAINING PROGRAM

## 2022-08-19 PROCEDURE — 250N000013 HC RX MED GY IP 250 OP 250 PS 637: Performed by: STUDENT IN AN ORGANIZED HEALTH CARE EDUCATION/TRAINING PROGRAM

## 2022-08-19 PROCEDURE — 83735 ASSAY OF MAGNESIUM: CPT | Performed by: STUDENT IN AN ORGANIZED HEALTH CARE EDUCATION/TRAINING PROGRAM

## 2022-08-19 PROCEDURE — 250N000012 HC RX MED GY IP 250 OP 636 PS 637: Performed by: STUDENT IN AN ORGANIZED HEALTH CARE EDUCATION/TRAINING PROGRAM

## 2022-08-19 PROCEDURE — 82248 BILIRUBIN DIRECT: CPT | Performed by: STUDENT IN AN ORGANIZED HEALTH CARE EDUCATION/TRAINING PROGRAM

## 2022-08-19 RX ORDER — MULTIPLE VITAMINS W/ MINERALS TAB 9MG-400MCG
1 TAB ORAL DAILY
Qty: 30 TABLET | Refills: 0 | Status: SHIPPED | OUTPATIENT
Start: 2022-08-20 | End: 2022-09-19

## 2022-08-19 RX ORDER — MIRTAZAPINE 15 MG/1
15 TABLET, FILM COATED ORAL AT BEDTIME
Qty: 30 TABLET | Refills: 0 | Status: SHIPPED | OUTPATIENT
Start: 2022-08-19 | End: 2022-08-23

## 2022-08-19 RX ORDER — PREDNISONE 20 MG/1
40 TABLET ORAL DAILY
Qty: 14 TABLET | Refills: 0 | Status: SHIPPED | OUTPATIENT
Start: 2022-08-19 | End: 2022-08-26

## 2022-08-19 RX ORDER — HYDROXYZINE HYDROCHLORIDE 25 MG/1
25 TABLET, FILM COATED ORAL EVERY 6 HOURS PRN
Qty: 30 TABLET | Refills: 0 | Status: SHIPPED | OUTPATIENT
Start: 2022-08-19 | End: 2022-09-21

## 2022-08-19 RX ORDER — LANOLIN ALCOHOL/MO/W.PET/CERES
100 CREAM (GRAM) TOPICAL DAILY
Qty: 30 TABLET | Refills: 0 | Status: SHIPPED | OUTPATIENT
Start: 2022-08-20 | End: 2022-09-08

## 2022-08-19 RX ORDER — GABAPENTIN 300 MG/1
300 CAPSULE ORAL 3 TIMES DAILY
Qty: 90 CAPSULE | Refills: 0 | Status: SHIPPED | OUTPATIENT
Start: 2022-08-19 | End: 2022-08-23

## 2022-08-19 RX ORDER — LANOLIN ALCOHOL/MO/W.PET/CERES
3 CREAM (GRAM) TOPICAL
Qty: 30 TABLET | Refills: 0 | Status: SHIPPED | OUTPATIENT
Start: 2022-08-19 | End: 2022-09-19

## 2022-08-19 RX ORDER — FOLIC ACID 1 MG/1
1 TABLET ORAL DAILY
Qty: 30 TABLET | Refills: 0 | Status: SHIPPED | OUTPATIENT
Start: 2022-08-20 | End: 2022-09-19

## 2022-08-19 RX ADMIN — FOLIC ACID 1 MG: 1 TABLET ORAL at 08:45

## 2022-08-19 RX ADMIN — THIAMINE HCL TAB 100 MG 100 MG: 100 TAB at 08:45

## 2022-08-19 RX ADMIN — GABAPENTIN 300 MG: 300 CAPSULE ORAL at 08:45

## 2022-08-19 RX ADMIN — GABAPENTIN 300 MG: 300 CAPSULE ORAL at 13:56

## 2022-08-19 RX ADMIN — PREDNISOLONE SODIUM PHOSPHATE 40 MG: 15 SOLUTION ORAL at 08:44

## 2022-08-19 RX ADMIN — MULTIPLE VITAMINS W/ MINERALS TAB 1 TABLET: TAB at 08:50

## 2022-08-19 ASSESSMENT — ACTIVITIES OF DAILY LIVING (ADL)
ADLS_ACUITY_SCORE: 20

## 2022-08-19 NOTE — PLAN OF CARE
"[id number]    Droplet precautions maintained for patient througout shift.     Bilateral LE moderate edema - applied ordered compression socks. Applied cold packs to ankles per patient request. Elevated legs while in recliner and while in bed.     Patient independent in room with steady gait.     LBM 8/18/22 - patient reports \"somewhere between formed/soft and starting to get loose\". Per patient request, held bowel medications. Abdomin round and distended.     Voiding spontaneously without issues.     Patient declined full set of vitals - increased rounding and visualization of patient without any acute events throughout shift.     Patient denies chest pain, SOB, n/v, and n/t. Patient declines     Patient's call light within reach and able to make needs known.    R lower forearm PIV infusing sodium phosphate 15 mmol in D5W over 4 hours. L lower forearm PIV saline locked.     Plan:   Continue with RN managed phosphorous infusion and recheck lab values with AM lab draw. Discharge 8/19/22, pending AM lab draw outcome.   "

## 2022-08-19 NOTE — DISCHARGE INSTRUCTIONS
Recommend going to clinic appointment 1 hour early on Tuesday to complete blood work.    If you have questions/concerns over the weekend, call your [place] clinic to speak to a triage nurse.

## 2022-08-19 NOTE — PROGRESS NOTES
SPIRITUAL HEALTH SERVICES  SPIRITUAL ASSESSMENT Progress Note  [facility] ([facility]) 6 B Med Surge R 650 8/19/22      REFERRAL SOURCE: Self Referral    Knock at the door to visit Pt. Pt was busy.     PLAN: Will check with Pt another day    Harvinder Simons MA, MPA  Associate   Pager: [phone]

## 2022-08-19 NOTE — PLAN OF CARE
Pt denied feelings of constipation, denied pain, A&Ox4. Pt's skin and sclera exhibited signs of jaundice, stomach was distended, and bilat LE had 3+ edema. Writer encouraged use of TEDs stockings to manage LE edema, pt stated that he wanted to use them once he got home. Writer also encouraged elevation of BLE for edema management.      Pt. discharged at  1505 to home, and left with personal belongings. Pt. received complete discharge paperwork and medications as filled by discharge pharmacy. Pt. was given times of last dose for all discharge medications in writing on discharge medication sheets. Discharge teaching included medication, pain management, and use of TEDs to help alleviate lower extremity swelling. Pt. to follow up with primary care provider Tues 08/23,  Pt's wife arranged appt. Pt. had no further questions at the time of discharge and no unmet needs were identified.

## 2022-08-23 ENCOUNTER — OFFICE VISIT (OUTPATIENT)
Dept: FAMILY MEDICINE | Facility: CLINIC | Age: 32
End: 2022-08-23
Payer: COMMERCIAL

## 2022-08-23 VITALS
HEIGHT: 69 IN | WEIGHT: 203.6 LBS | SYSTOLIC BLOOD PRESSURE: 134 MMHG | DIASTOLIC BLOOD PRESSURE: 70 MMHG | BODY MASS INDEX: 30.16 KG/M2 | OXYGEN SATURATION: 98 % | TEMPERATURE: 98.5 F | RESPIRATION RATE: 18 BRPM | HEART RATE: 103 BPM

## 2022-08-23 DIAGNOSIS — R60.0 PERIPHERAL EDEMA: ICD-10-CM

## 2022-08-23 DIAGNOSIS — K72.00 ACUTE LIVER FAILURE WITHOUT HEPATIC COMA: Primary | ICD-10-CM

## 2022-08-23 DIAGNOSIS — F10.232 ALCOHOL DEPENDENCE WITH WITHDRAWAL WITH PERCEPTUAL DISTURBANCE (H): ICD-10-CM

## 2022-08-23 DIAGNOSIS — F41.9 ANXIETY: ICD-10-CM

## 2022-08-23 DIAGNOSIS — K70.10 ALCOHOLIC HEPATITIS WITHOUT ASCITES (H): ICD-10-CM

## 2022-08-23 DIAGNOSIS — E83.39 HYPOPHOSPHATEMIA: ICD-10-CM

## 2022-08-23 LAB
ALBUMIN SERPL-MCNC: 2.4 G/DL (ref 3.4–5)
ALP SERPL-CCNC: 214 U/L (ref 40–150)
ALT SERPL W P-5'-P-CCNC: 102 U/L (ref 0–70)
ANION GAP SERPL CALCULATED.3IONS-SCNC: 10 MMOL/L (ref 3–14)
AST SERPL W P-5'-P-CCNC: 177 U/L (ref 0–45)
BILIRUB SERPL-MCNC: 17.4 MG/DL (ref 0.2–1.3)
BUN SERPL-MCNC: 13 MG/DL (ref 7–30)
CALCIUM SERPL-MCNC: 8.1 MG/DL (ref 8.5–10.1)
CHLORIDE BLD-SCNC: 103 MMOL/L (ref 94–109)
CO2 SERPL-SCNC: 23 MMOL/L (ref 20–32)
CREAT SERPL-MCNC: 0.58 MG/DL (ref 0.66–1.25)
ERYTHROCYTE [DISTWIDTH] IN BLOOD BY AUTOMATED COUNT: 19.4 % (ref 10–15)
GFR SERPL CREATININE-BSD FRML MDRD: >90 ML/MIN/1.73M2
GLUCOSE BLD-MCNC: 137 MG/DL (ref 70–99)
HCT VFR BLD AUTO: 31 % (ref 40–53)
HGB BLD-MCNC: 9.6 G/DL (ref 13.3–17.7)
INR PPP: 2.27 (ref 0.85–1.15)
MCH RBC QN AUTO: 28.5 PG (ref 26.5–33)
MCHC RBC AUTO-ENTMCNC: 31 G/DL (ref 31.5–36.5)
MCV RBC AUTO: 92 FL (ref 78–100)
PHOSPHATE SERPL-MCNC: 2.4 MG/DL (ref 2.5–4.5)
PLATELET # BLD AUTO: 280 10E3/UL (ref 150–450)
POTASSIUM BLD-SCNC: 3.5 MMOL/L (ref 3.4–5.3)
PROT SERPL-MCNC: 6.9 G/DL (ref 6.8–8.8)
RBC # BLD AUTO: 3.37 10E6/UL (ref 4.4–5.9)
SODIUM SERPL-SCNC: 136 MMOL/L (ref 133–144)
WBC # BLD AUTO: 16 10E3/UL (ref 4–11)

## 2022-08-23 PROCEDURE — 85027 COMPLETE CBC AUTOMATED: CPT | Performed by: NURSE PRACTITIONER

## 2022-08-23 PROCEDURE — 80053 COMPREHEN METABOLIC PANEL: CPT | Performed by: NURSE PRACTITIONER

## 2022-08-23 PROCEDURE — 85610 PROTHROMBIN TIME: CPT | Performed by: NURSE PRACTITIONER

## 2022-08-23 PROCEDURE — 36415 COLL VENOUS BLD VENIPUNCTURE: CPT | Performed by: NURSE PRACTITIONER

## 2022-08-23 PROCEDURE — 84100 ASSAY OF PHOSPHORUS: CPT | Performed by: NURSE PRACTITIONER

## 2022-08-23 PROCEDURE — 99495 TRANSJ CARE MGMT MOD F2F 14D: CPT | Performed by: NURSE PRACTITIONER

## 2022-08-23 RX ORDER — GABAPENTIN 300 MG/1
300 CAPSULE ORAL 3 TIMES DAILY
Qty: 90 CAPSULE | Refills: 0 | Status: SHIPPED | OUTPATIENT
Start: 2022-08-23 | End: 2022-09-30

## 2022-08-23 RX ORDER — MIRTAZAPINE 15 MG/1
15 TABLET, FILM COATED ORAL AT BEDTIME
Qty: 30 TABLET | Refills: 0 | Status: SHIPPED | OUTPATIENT
Start: 2022-08-23 | End: 2022-09-21

## 2022-08-23 RX ORDER — FUROSEMIDE 20 MG
20 TABLET ORAL DAILY
Qty: 30 TABLET | Refills: 0 | Status: SHIPPED | OUTPATIENT
Start: 2022-08-23 | End: 2022-09-08

## 2022-08-23 NOTE — PROGRESS NOTES
"  Assessment & Plan     Acute liver failure without hepatic coma  Needs to establish with outpatient GI/Liver specialist. Wait to hear from the liver team regarding today's labs and instructions on your Prednisone (per the discharge summary).   - Adult GI  Referral - Consult Only; Future    Hypophosphatemia  Stable, continue oral  - Phosphorus  - Adult GI  Referral - Consult Only; Future    Alcoholic hepatitis without ascites    - INR  - CBC with platelets  - Comprehensive metabolic panel (BMP + Alb, Alk Phos, ALT, AST, Total. Bili, TP)  - Adult GI  Referral - Consult Only; Future    Alcohol dependence with withdrawal with perceptual disturbance (H)  Continue for now  - gabapentin (NEURONTIN) 300 MG capsule; Take 1 capsule (300 mg) by mouth 3 times daily    Anxiety  Stable, will continue meds, looking into addiction/rehab already- thinking [facility]  - mirtazapine (REMERON) 15 MG tablet; Take 1 tablet (15 mg) by mouth At Bedtime    Peripheral edema  Trial- instructed to ask liver team if any adverse affect on phos level and notify them this was ordered  - furosemide (LASIX) 20 MG tablet; Take 1 tablet (20 mg) by mouth daily           BMI:   Estimated body mass index is 30.07 kg/m  as calculated from the following:    Height as of this encounter: 1.753 m (5' 9\").    Weight as of this encounter: 92.4 kg (203 lb 9.6 oz).   Weight management plan: Patient was referred to their PCP to discuss a diet and exercise plan.    CONSULTATION/REFERRAL to GI/Liver    FUTURE APPOINTMENTS:       - Follow-up visit in 1 month with PCP    No follow-ups on file.    DINA De Los Santos CNP  [facility]   Uriel is a 32 year old, presenting for the following health issues:  Hospital F/U      [facility]       Hospital Follow-up Visit:    Hospital/Nursing Home/IP Rehab Facility: [facility]  Date of Admission: 8/13/22  Date of " "Discharge: 8/19/22  Reason(s) for Admission: alcohol withdrawal    Was your hospitalization related to COVID-19? No   Problems taking medications regularly:  None  Medication changes since discharge: started on folic acid, gabapentin, hydroxyzine, melatonin, mirtazapine, multivitamin, prednisone, and thiamine  Problems adhering to non-medication therapy:  None    Summary of hospitalization:  [facility] discharge summary reviewed  Diagnostic Tests/Treatments reviewed.  Follow up needed: labs, med review, discuss addiction referral, discuss mental health  Other Healthcare Providers Involved in Patient s Care:         GI  Update since discharge: stable.         Post Medication Reconciliation Status:        Plan of care communicated with patient             Review of Systems   Constitutional, HEENT, cardiovascular, pulmonary, gi and gu systems are negative, except as otherwise noted.      Objective    /70   Pulse 103   Temp 98.5  F (36.9  C) (Tympanic)   Resp 18   Ht 1.753 m (5' 9\")   Wt 92.4 kg (203 lb 9.6 oz)   SpO2 98%   BMI 30.07 kg/m    Body mass index is 30.07 kg/m .  Physical Exam   GENERAL: alert and no distress  EYES: conjunctiva/corneas- icteric   RESP: no wheezes  CV: regular rates and rhythm and 3+ bilateral lower extremity pitting edema to legs and feet   ABDOMEN: distended  MS: no gross musculoskeletal defects noted, no edema  SKIN: jaundice  NEURO: Normal strength and tone, mentation intact and speech normal  PSYCH: mentation appears normal, affect flat and judgement and insight intact    Results for orders placed or performed in visit on 08/23/22   Phosphorus     Status: Abnormal   Result Value Ref Range    Phosphorus 2.4 (L) 2.5 - 4.5 mg/dL   INR     Status: Abnormal   Result Value Ref Range    INR 2.27 (H) 0.85 - 1.15   CBC with platelets     Status: Abnormal   Result Value Ref Range    WBC Count 16.0 (H) 4.0 - 11.0 10e3/uL    RBC Count 3.37 (L) 4.40 - 5.90 10e6/uL    " Hemoglobin 9.6 (L) 13.3 - 17.7 g/dL    Hematocrit 31.0 (L) 40.0 - 53.0 %    MCV 92 78 - 100 fL    MCH 28.5 26.5 - 33.0 pg    MCHC 31.0 (L) 31.5 - 36.5 g/dL    RDW 19.4 (H) 10.0 - 15.0 %    Platelet Count 280 150 - 450 10e3/uL   Comprehensive metabolic panel (BMP + Alb, Alk Phos, ALT, AST, Total. Bili, TP)     Status: Abnormal   Result Value Ref Range    Sodium 136 133 - 144 mmol/L    Potassium 3.5 3.4 - 5.3 mmol/L    Chloride 103 94 - 109 mmol/L    Carbon Dioxide (CO2) 23 20 - 32 mmol/L    Anion Gap 10 3 - 14 mmol/L    Urea Nitrogen 13 7 - 30 mg/dL    Creatinine 0.58 (L) 0.66 - 1.25 mg/dL    Calcium 8.1 (L) 8.5 - 10.1 mg/dL    Glucose 137 (H) 70 - 99 mg/dL    Alkaline Phosphatase 214 (H) 40 - 150 U/L     (H) 0 - 45 U/L     (H) 0 - 70 U/L    Protein Total 6.9 6.8 - 8.8 g/dL    Albumin 2.4 (L) 3.4 - 5.0 g/dL    Bilirubin Total 17.4 (HH) 0.2 - 1.3 mg/dL    GFR Estimate >90 >60 mL/min/1.73m2                   .  ..

## 2022-08-24 ENCOUNTER — MYC MEDICAL ADVICE (OUTPATIENT)
Dept: FAMILY MEDICINE | Facility: CLINIC | Age: 32
End: 2022-08-24

## 2022-08-25 ENCOUNTER — TELEPHONE (OUTPATIENT)
Dept: GASTROENTEROLOGY | Facility: CLINIC | Age: 32
End: 2022-08-25

## 2022-08-25 ENCOUNTER — MYC MEDICAL ADVICE (OUTPATIENT)
Dept: FAMILY MEDICINE | Facility: CLINIC | Age: 32
End: 2022-08-25

## 2022-08-25 DIAGNOSIS — K70.10 ALCOHOLIC HEPATITIS WITHOUT ASCITES (H): Primary | ICD-10-CM

## 2022-08-25 RX ORDER — PREDNISONE 10 MG/1
40 TABLET ORAL DAILY
Qty: 68 TABLET | Refills: 0 | Status: SHIPPED | OUTPATIENT
Start: 2022-08-25 | End: 2022-09-09

## 2022-08-25 NOTE — TELEPHONE ENCOUNTER
See my chart message    No response from liver team    Saadia Phillips RN on 8/25/2022 at 10:24 AM

## 2022-08-25 NOTE — TELEPHONE ENCOUNTER
ADDENDUM:  Pt called 8/25/22 at 4:35 pm please call pt back as he has a question in regards to his prescription Prednisone. Call back # [phone]                Received call from patient.  He has been having some difficulty with sleeping at night, more related to an increase in urine output with diuretics. He notes improvement in edema near knees and calves but continues to have edema in feet and toes. He does elevate legs when able. He is more drowsy during the day and has been taking hydroxyzine once daily and Remeron at HS.     Denies current alcohol cravings. Has been on gabapentin.    Appetite increased. Denies fevers, abdominal pain, pruritus, anxiety or distention.     Labs 8/23/22:   Latest Reference Range & Units 08/23/22 16:13   Sodium 133 - 144 mmol/L 136   Potassium 3.4 - 5.3 mmol/L 3.5   Chloride 94 - 109 mmol/L 103   Carbon Dioxide 20 - 32 mmol/L 23   Urea Nitrogen 7 - 30 mg/dL 13   Creatinine 0.66 - 1.25 mg/dL 0.58 (L)   GFR Estimate >60 mL/min/1.73m2 >90   Calcium 8.5 - 10.1 mg/dL 8.1 (L)   Anion Gap 3 - 14 mmol/L 10   Phosphorus 2.5 - 4.5 mg/dL 2.4 (L)   Albumin 3.4 - 5.0 g/dL 2.4 (L)   Protein Total 6.8 - 8.8 g/dL 6.9   Alkaline Phosphatase 40 - 150 U/L 214 (H)   ALT 0 - 70 U/L 102 (H)   AST 0 - 45 U/L 177 (H)   Bilirubin Total 0.2 - 1.3 mg/dL 17.4 (HH)   Glucose 70 - 99 mg/dL 137 (H)   WBC 4.0 - 11.0 10e3/uL 16.0 (H)   Hemoglobin 13.3 - 17.7 g/dL 9.6 (L)   Hematocrit 40.0 - 53.0 % 31.0 (L)   Platelet Count 150 - 450 10e3/uL 280   RBC Count 4.40 - 5.90 10e6/uL 3.37 (L)   MCV 78 - 100 fL 92   MCH 26.5 - 33.0 pg 28.5   MCHC 31.5 - 36.5 g/dL 31.0 (L)   RDW 10.0 - 15.0 % 19.4 (H)   INR 0.85 - 1.15  2.27 (H)         Plan:  1. Alcohol hepatitis  - Lille score at day 5 is 0.13 indicating good prognosis. Will plan to continue steroids to complete one month of treatment.   - relayed overall improvement in labs, will take time. Slight increase in transaminases can be seen with alcohol hepatitis- will  continue to monitor with weekly labs for now.   - no evidence of infection- increase in WBC due to steroids and alc hep.   - has been able to increase oral intake of foods- appetite up with steroids.   - encouraged compression stockings for feet edema.     Hepatology apt scheduled with Dr. Villa on 10/17.     Kina Ambrose, APRN, CNP  Hepatology

## 2022-08-30 ENCOUNTER — LAB (OUTPATIENT)
Dept: LAB | Facility: CLINIC | Age: 32
End: 2022-08-30
Payer: COMMERCIAL

## 2022-08-30 DIAGNOSIS — K70.10 ALCOHOLIC HEPATITIS WITHOUT ASCITES (H): ICD-10-CM

## 2022-08-30 LAB
ERYTHROCYTE [DISTWIDTH] IN BLOOD BY AUTOMATED COUNT: 18.3 % (ref 10–15)
HCT VFR BLD AUTO: 29.9 % (ref 40–53)
HGB BLD-MCNC: 9.5 G/DL (ref 13.3–17.7)
MCH RBC QN AUTO: 29.2 PG (ref 26.5–33)
MCHC RBC AUTO-ENTMCNC: 31.8 G/DL (ref 31.5–36.5)
MCV RBC AUTO: 92 FL (ref 78–100)
PLATELET # BLD AUTO: 185 10E3/UL (ref 150–450)
RBC # BLD AUTO: 3.25 10E6/UL (ref 4.4–5.9)
WBC # BLD AUTO: 20.3 10E3/UL (ref 4–11)

## 2022-08-30 PROCEDURE — 85027 COMPLETE CBC AUTOMATED: CPT

## 2022-08-30 PROCEDURE — 80053 COMPREHEN METABOLIC PANEL: CPT

## 2022-08-30 PROCEDURE — 36415 COLL VENOUS BLD VENIPUNCTURE: CPT

## 2022-09-02 LAB
ALBUMIN SERPL BCG-MCNC: 3.2 G/DL (ref 3.5–5.2)
ALP SERPL-CCNC: 197 U/L (ref 40–129)
ALT SERPL W P-5'-P-CCNC: 107 U/L (ref 10–50)
ANION GAP SERPL CALCULATED.3IONS-SCNC: 20 MMOL/L (ref 7–15)
AST SERPL W P-5'-P-CCNC: 167 U/L (ref 10–50)
BILIRUB SERPL-MCNC: 17.2 MG/DL
BUN SERPL-MCNC: 11.4 MG/DL (ref 6–20)
CALCIUM SERPL-MCNC: 8.5 MG/DL (ref 8.6–10)
CHLORIDE SERPL-SCNC: 100 MMOL/L (ref 98–107)
CREAT SERPL-MCNC: 0.62 MG/DL (ref 0.67–1.17)
DEPRECATED HCO3 PLAS-SCNC: 16 MMOL/L (ref 22–29)
GFR SERPL CREATININE-BSD FRML MDRD: >90 ML/MIN/1.73M2
GLUCOSE SERPL-MCNC: 71 MG/DL (ref 70–99)
POTASSIUM SERPL-SCNC: 4.2 MMOL/L (ref 3.4–5.3)
PROT SERPL-MCNC: 6.8 G/DL (ref 6.4–8.3)
SODIUM SERPL-SCNC: 136 MMOL/L (ref 136–145)

## 2022-09-06 ENCOUNTER — LAB (OUTPATIENT)
Dept: LAB | Facility: CLINIC | Age: 32
End: 2022-09-06
Payer: COMMERCIAL

## 2022-09-06 DIAGNOSIS — K70.10 ALCOHOLIC HEPATITIS WITHOUT ASCITES (H): ICD-10-CM

## 2022-09-06 LAB
ALBUMIN SERPL BCG-MCNC: 3.1 G/DL (ref 3.5–5.2)
ALP SERPL-CCNC: 208 U/L (ref 40–129)
ALT SERPL W P-5'-P-CCNC: 106 U/L (ref 10–50)
ANION GAP SERPL CALCULATED.3IONS-SCNC: 14 MMOL/L (ref 7–15)
AST SERPL W P-5'-P-CCNC: 146 U/L (ref 10–50)
BILIRUB SERPL-MCNC: 15.4 MG/DL
BUN SERPL-MCNC: 12.7 MG/DL (ref 6–20)
CALCIUM SERPL-MCNC: 8.2 MG/DL (ref 8.6–10)
CHLORIDE SERPL-SCNC: 102 MMOL/L (ref 98–107)
CREAT SERPL-MCNC: 0.61 MG/DL (ref 0.67–1.17)
DEPRECATED HCO3 PLAS-SCNC: 20 MMOL/L (ref 22–29)
ERYTHROCYTE [DISTWIDTH] IN BLOOD BY AUTOMATED COUNT: 17.6 % (ref 10–15)
GFR SERPL CREATININE-BSD FRML MDRD: >90 ML/MIN/1.73M2
GLUCOSE SERPL-MCNC: 98 MG/DL (ref 70–99)
HCT VFR BLD AUTO: 28.8 % (ref 40–53)
HGB BLD-MCNC: 9.6 G/DL (ref 13.3–17.7)
MCH RBC QN AUTO: 29.8 PG (ref 26.5–33)
MCHC RBC AUTO-ENTMCNC: 33.3 G/DL (ref 31.5–36.5)
MCV RBC AUTO: 89 FL (ref 78–100)
PLATELET # BLD AUTO: 152 10E3/UL (ref 150–450)
POTASSIUM SERPL-SCNC: 3.2 MMOL/L (ref 3.4–5.3)
PROT SERPL-MCNC: 6.3 G/DL (ref 6.4–8.3)
RBC # BLD AUTO: 3.22 10E6/UL (ref 4.4–5.9)
SODIUM SERPL-SCNC: 136 MMOL/L (ref 136–145)
WBC # BLD AUTO: 22 10E3/UL (ref 4–11)

## 2022-09-06 PROCEDURE — 85027 COMPLETE CBC AUTOMATED: CPT

## 2022-09-06 PROCEDURE — 80053 COMPREHEN METABOLIC PANEL: CPT

## 2022-09-06 PROCEDURE — 36415 COLL VENOUS BLD VENIPUNCTURE: CPT

## 2022-09-08 ENCOUNTER — TELEPHONE (OUTPATIENT)
Dept: FAMILY MEDICINE | Facility: CLINIC | Age: 32
End: 2022-09-08

## 2022-09-08 DIAGNOSIS — K70.10 ALCOHOLIC HEPATITIS WITHOUT ASCITES (H): ICD-10-CM

## 2022-09-08 DIAGNOSIS — E83.39 HYPOPHOSPHATEMIA: ICD-10-CM

## 2022-09-08 DIAGNOSIS — F10.232 ALCOHOL DEPENDENCE WITH WITHDRAWAL WITH PERCEPTUAL DISTURBANCE (H): ICD-10-CM

## 2022-09-08 DIAGNOSIS — R60.0 PERIPHERAL EDEMA: ICD-10-CM

## 2022-09-08 RX ORDER — PREDNISONE 10 MG/1
40 TABLET ORAL DAILY
Qty: 68 TABLET | Refills: 0 | OUTPATIENT
Start: 2022-09-08

## 2022-09-08 RX ORDER — LANOLIN ALCOHOL/MO/W.PET/CERES
100 CREAM (GRAM) TOPICAL DAILY
Qty: 30 TABLET | Refills: 0 | Status: SHIPPED | OUTPATIENT
Start: 2022-09-08 | End: 2022-09-19

## 2022-09-08 RX ORDER — FUROSEMIDE 20 MG
40 TABLET ORAL DAILY
Qty: 60 TABLET | Refills: 0 | Status: SHIPPED | OUTPATIENT
Start: 2022-09-08 | End: 2022-10-06

## 2022-09-08 NOTE — TELEPHONE ENCOUNTER
GI needs to place orders for prednisone. I do not know what they want to his prednisone taper. He should contact their office for this. Refilled other.    Venkatesh Glover MD

## 2022-09-08 NOTE — TELEPHONE ENCOUNTER
Patient should stay on his current regimen. I wouldn't change the lasix dose before I see him in person and evaluate him.  I can refill other medications if he would run out before his appointment with me.  Please te and inform    Venkatesh Glover MD

## 2022-09-08 NOTE — TELEPHONE ENCOUNTER
Patient stating that right foot continues to remain swollen,  Left foot has improved.    Patient is elevating, using compression stockings and icing.    No reports of shortness of breath or chest discomfort.    Patient wondering if diuretic can be increased.    Currently taking lasix 20 mg daily.    Also taking Prednisone 40 mg daily, last dose will be on Sunday.    Patient asking if Prednisone should be continued.    Please advise on treatment plan.    Lula DEVINE

## 2022-09-08 NOTE — TELEPHONE ENCOUNTER
Reason for call:  Patient reporting a symptom    Symptom or request: Pt's ongoing leg edema has been getting worse in his right leg/foot.  No SOB.  Pt seeing new PCP to [facility] care, but not until 9/21, so would like JORGE Ramirez's advice on ongoing edema.  Please call patient and advise.      Duration (how long have symptoms been present): ongoing    Have you been treated for this before? Yes    Additional comments:     Phone Number patient can be reached at:  Home number on file [phone] (home)    Best Time:  any    Can we leave a detailed message on this number:  YES    Call taken on 9/8/2022 at 11:17 AM by Bel Strong

## 2022-09-09 RX ORDER — PREDNISONE 10 MG/1
40 TABLET ORAL DAILY
Qty: 48 TABLET | Refills: 0 | Status: SHIPPED | OUTPATIENT
Start: 2022-09-09 | End: 2022-09-21

## 2022-09-09 NOTE — TELEPHONE ENCOUNTER
Looks like this was addressed already per Dr. Garcia but I'm not sure if they are under the impression he is taking 1 or 2 daily as I changed the Rx yesterday, please have him clarify with them

## 2022-09-09 NOTE — TELEPHONE ENCOUNTER
Prednisone 40mg/day refilled for 12 days  Please call him back and let him know if needed.    Sona Phelan MD

## 2022-09-09 NOTE — TELEPHONE ENCOUNTER
Hospitalization follow up with Jose 9/21/22     So far Ashley has been fillin gthe post hospitalization medications to get patient to this specialty appt with Jose    Patient has called Jose's office and they have instructed patient to continue with the same regimen he is on until his 9/21/22 appt.    Patient will inform Jose's office that his lasix has been increased to 40mg from 20mg by Ashley on 9/8/22.    Patient will be out of prednisone on Sunday.  Patient is currently on 40mg daily.  Patient is asking if prednisone can be continued until his 9/21/22 appt as it is helping him.     Patient would like to use [facility] in [facility].    Routed to Ashley and  Pool  Can patient have medication as pended?

## 2022-09-13 ENCOUNTER — LAB (OUTPATIENT)
Dept: LAB | Facility: CLINIC | Age: 32
End: 2022-09-13
Payer: COMMERCIAL

## 2022-09-13 DIAGNOSIS — K70.10 ALCOHOLIC HEPATITIS WITHOUT ASCITES (H): ICD-10-CM

## 2022-09-13 LAB
ERYTHROCYTE [DISTWIDTH] IN BLOOD BY AUTOMATED COUNT: 17.4 % (ref 10–15)
HCT VFR BLD AUTO: 29 % (ref 40–53)
HGB BLD-MCNC: 9.6 G/DL (ref 13.3–17.7)
MCH RBC QN AUTO: 29.4 PG (ref 26.5–33)
MCHC RBC AUTO-ENTMCNC: 33.1 G/DL (ref 31.5–36.5)
MCV RBC AUTO: 89 FL (ref 78–100)
PLATELET # BLD AUTO: 147 10E3/UL (ref 150–450)
RBC # BLD AUTO: 3.26 10E6/UL (ref 4.4–5.9)
WBC # BLD AUTO: 18.5 10E3/UL (ref 4–11)

## 2022-09-13 PROCEDURE — 85027 COMPLETE CBC AUTOMATED: CPT

## 2022-09-13 PROCEDURE — 80053 COMPREHEN METABOLIC PANEL: CPT

## 2022-09-13 PROCEDURE — 36415 COLL VENOUS BLD VENIPUNCTURE: CPT

## 2022-09-14 LAB
ALBUMIN SERPL BCG-MCNC: 3.2 G/DL (ref 3.5–5.2)
ALP SERPL-CCNC: 229 U/L (ref 40–129)
ALT SERPL W P-5'-P-CCNC: 111 U/L (ref 10–50)
ANION GAP SERPL CALCULATED.3IONS-SCNC: 14 MMOL/L (ref 7–15)
AST SERPL W P-5'-P-CCNC: 152 U/L (ref 10–50)
BILIRUB SERPL-MCNC: 13.8 MG/DL
BUN SERPL-MCNC: 11.2 MG/DL (ref 6–20)
CALCIUM SERPL-MCNC: 8.5 MG/DL (ref 8.6–10)
CHLORIDE SERPL-SCNC: 98 MMOL/L (ref 98–107)
CREAT SERPL-MCNC: 0.61 MG/DL (ref 0.67–1.17)
DEPRECATED HCO3 PLAS-SCNC: 24 MMOL/L (ref 22–29)
GFR SERPL CREATININE-BSD FRML MDRD: >90 ML/MIN/1.73M2
GLUCOSE SERPL-MCNC: 163 MG/DL (ref 70–99)
POTASSIUM SERPL-SCNC: 3.9 MMOL/L (ref 3.4–5.3)
PROT SERPL-MCNC: 6.7 G/DL (ref 6.4–8.3)
SODIUM SERPL-SCNC: 136 MMOL/L (ref 136–145)

## 2022-09-15 ENCOUNTER — TELEPHONE (OUTPATIENT)
Dept: LAB | Facility: CLINIC | Age: 32
End: 2022-09-15

## 2022-09-15 DIAGNOSIS — K70.10 ALCOHOLIC HEPATITIS WITHOUT ASCITES (H): Primary | ICD-10-CM

## 2022-09-16 ENCOUNTER — NURSE TRIAGE (OUTPATIENT)
Dept: NURSING | Facility: CLINIC | Age: 32
End: 2022-09-16

## 2022-09-19 ENCOUNTER — TELEPHONE (OUTPATIENT)
Dept: FAMILY MEDICINE | Facility: CLINIC | Age: 32
End: 2022-09-19

## 2022-09-19 DIAGNOSIS — F10.232 ALCOHOL DEPENDENCE WITH WITHDRAWAL WITH PERCEPTUAL DISTURBANCE (H): ICD-10-CM

## 2022-09-19 DIAGNOSIS — F41.9 ANXIETY: ICD-10-CM

## 2022-09-19 RX ORDER — MULTIPLE VITAMINS W/ MINERALS TAB 9MG-400MCG
1 TAB ORAL DAILY
Qty: 30 TABLET | Refills: 0 | Status: SHIPPED | OUTPATIENT
Start: 2022-09-19 | End: 2023-02-27

## 2022-09-19 RX ORDER — LANOLIN ALCOHOL/MO/W.PET/CERES
3 CREAM (GRAM) TOPICAL
Qty: 30 TABLET | Refills: 0 | Status: SHIPPED | OUTPATIENT
Start: 2022-09-19 | End: 2023-02-27

## 2022-09-19 RX ORDER — FOLIC ACID 1 MG/1
1 TABLET ORAL DAILY
Qty: 30 TABLET | Refills: 0 | Status: SHIPPED | OUTPATIENT
Start: 2022-09-19 | End: 2022-09-21

## 2022-09-19 RX ORDER — LANOLIN ALCOHOL/MO/W.PET/CERES
100 CREAM (GRAM) TOPICAL DAILY
Qty: 30 TABLET | Refills: 0 | Status: SHIPPED | OUTPATIENT
Start: 2022-09-19 | End: 2022-09-21

## 2022-09-19 NOTE — TELEPHONE ENCOUNTER
Pending Prescriptions:                       Disp   Refills    folic acid (FOLVITE) 1 MG tablet          30 tab*0            Sig: Take 1 tablet (1 mg) by mouth daily    thiamine (B-1) 100 MG tablet              30 tab*0            Sig: Take 1 tablet (100 mg) by mouth daily    multivitamin w/minerals (THERA-VIT-M) tab*30 tab*0            Sig: Take 1 tablet by mouth daily    melatonin 3 MG tablet                     30 tab*0            Sig: Take 1 tablet (3 mg) by mouth nightly as needed           for sleep    Pt ran out over the weekend.

## 2022-09-19 NOTE — TELEPHONE ENCOUNTER
Reason for call:  Other     Patient called regarding (reason for call): call back    Additional comments: Pt reports throbbing, aching knee pain most days. Would like to discuss before appt on Wednesday if possible.    Phone number to reach patient:  Home number on file [phone] (home)    Best Time:  Any    Can we leave a detailed message on this number?  YES

## 2022-09-20 ENCOUNTER — LAB (OUTPATIENT)
Dept: LAB | Facility: CLINIC | Age: 32
End: 2022-09-20
Payer: COMMERCIAL

## 2022-09-20 DIAGNOSIS — K70.10 ALCOHOLIC HEPATITIS WITHOUT ASCITES (H): ICD-10-CM

## 2022-09-20 LAB
ALBUMIN SERPL BCG-MCNC: 3.1 G/DL (ref 3.5–5.2)
ALP SERPL-CCNC: 217 U/L (ref 40–129)
ALT SERPL W P-5'-P-CCNC: 93 U/L (ref 10–50)
ANION GAP SERPL CALCULATED.3IONS-SCNC: 13 MMOL/L (ref 7–15)
AST SERPL W P-5'-P-CCNC: 153 U/L (ref 10–50)
BILIRUB SERPL-MCNC: 12.3 MG/DL
BUN SERPL-MCNC: 12.4 MG/DL (ref 6–20)
CALCIUM SERPL-MCNC: 8.1 MG/DL (ref 8.6–10)
CHLORIDE SERPL-SCNC: 98 MMOL/L (ref 98–107)
CREAT SERPL-MCNC: 0.55 MG/DL (ref 0.67–1.17)
DEPRECATED HCO3 PLAS-SCNC: 21 MMOL/L (ref 22–29)
ERYTHROCYTE [DISTWIDTH] IN BLOOD BY AUTOMATED COUNT: 17.3 % (ref 10–15)
GFR SERPL CREATININE-BSD FRML MDRD: >90 ML/MIN/1.73M2
GLUCOSE SERPL-MCNC: 184 MG/DL (ref 70–99)
HCT VFR BLD AUTO: 27.8 % (ref 40–53)
HGB BLD-MCNC: 9.6 G/DL (ref 13.3–17.7)
MCH RBC QN AUTO: 30.2 PG (ref 26.5–33)
MCHC RBC AUTO-ENTMCNC: 34.5 G/DL (ref 31.5–36.5)
MCV RBC AUTO: 87 FL (ref 78–100)
PLATELET # BLD AUTO: 161 10E3/UL (ref 150–450)
POTASSIUM SERPL-SCNC: 3.7 MMOL/L (ref 3.4–5.3)
PROT SERPL-MCNC: 6.6 G/DL (ref 6.4–8.3)
RBC # BLD AUTO: 3.18 10E6/UL (ref 4.4–5.9)
SODIUM SERPL-SCNC: 132 MMOL/L (ref 136–145)
WBC # BLD AUTO: 17.7 10E3/UL (ref 4–11)

## 2022-09-20 PROCEDURE — 80053 COMPREHEN METABOLIC PANEL: CPT

## 2022-09-20 PROCEDURE — 85027 COMPLETE CBC AUTOMATED: CPT

## 2022-09-20 PROCEDURE — 36415 COLL VENOUS BLD VENIPUNCTURE: CPT

## 2022-09-21 ENCOUNTER — OFFICE VISIT (OUTPATIENT)
Dept: FAMILY MEDICINE | Facility: CLINIC | Age: 32
End: 2022-09-21
Payer: COMMERCIAL

## 2022-09-21 VITALS
HEART RATE: 114 BPM | TEMPERATURE: 98.1 F | DIASTOLIC BLOOD PRESSURE: 58 MMHG | WEIGHT: 175.3 LBS | SYSTOLIC BLOOD PRESSURE: 122 MMHG | BODY MASS INDEX: 25.96 KG/M2 | RESPIRATION RATE: 20 BRPM | OXYGEN SATURATION: 98 % | HEIGHT: 69 IN

## 2022-09-21 DIAGNOSIS — K70.10 ALCOHOLIC HEPATITIS WITHOUT ASCITES (H): ICD-10-CM

## 2022-09-21 DIAGNOSIS — R53.1 WEAKNESS: ICD-10-CM

## 2022-09-21 DIAGNOSIS — E44.0 MODERATE MALNUTRITION (H): Primary | ICD-10-CM

## 2022-09-21 DIAGNOSIS — E83.39 HYPOPHOSPHATEMIA: ICD-10-CM

## 2022-09-21 PROCEDURE — 99204 OFFICE O/P NEW MOD 45 MIN: CPT | Performed by: STUDENT IN AN ORGANIZED HEALTH CARE EDUCATION/TRAINING PROGRAM

## 2022-09-21 RX ORDER — PREDNISONE 10 MG/1
40 TABLET ORAL DAILY
Qty: 48 TABLET | Refills: 0 | Status: SHIPPED | OUTPATIENT
Start: 2022-09-21 | End: 2022-10-04

## 2022-09-21 ASSESSMENT — ENCOUNTER SYMPTOMS
CHILLS: 0
JOINT SWELLING: 0
DIARRHEA: 0
DYSURIA: 0
SHORTNESS OF BREATH: 0
HEMATOCHEZIA: 0
ABDOMINAL PAIN: 0
DIZZINESS: 0
MYALGIAS: 0
NERVOUS/ANXIOUS: 0
FREQUENCY: 1
HEMATURIA: 0
SORE THROAT: 0
HEADACHES: 1
CONSTIPATION: 0
ARTHRALGIAS: 1
EYE PAIN: 0
NAUSEA: 0
PALPITATIONS: 0
WEAKNESS: 0
HEARTBURN: 0
PARESTHESIAS: 0
FEVER: 0
COUGH: 0

## 2022-09-21 ASSESSMENT — PAIN SCALES - GENERAL: PAINLEVEL: NO PAIN (0)

## 2022-09-21 NOTE — PROGRESS NOTES
Hospital Follow-up Visit:    Assessment and Plan     32-year-old male with past medical history of severe alcohol use disorder with acute liver failure and withdrawal recent hospitalization approximately 1 month ago.  Associated malnutrition, electrolyte disturbance, kidney injury.  Patient has stopped all alcohol use and his labs have been monitored by GI over the last couple weeks and slowly improving.  Calculated his meld score today which was 27 19.3% mortality over the next 3 months.  He continues on prednisone and I placed a refill but asked that he contact his GI physician to discuss if he should continue on this dose or taper down.  He has a hepatology follow-up in approximately a month.  With patient's significant malnutrition weakness I recommend referral to occupational and physical therapy.  Unfortunately situation complicated by the fact that patient is moving and does not intend to follow with our clinic in the long-term.    1. Hypophosphatemia  - phosphorus tablet 250 mg (PHOSPHA 250 NEUTRAL) 250 MG per tablet; Take 2 tablets (500 mg) by mouth 3 times daily  Dispense: 63 tablet; Refill: 1  - Phosphorus; Standing    2. Alcoholic hepatitis without ascites  - predniSONE (DELTASONE) 10 MG tablet; Take 4 tablets (40 mg) by mouth daily  Dispense: 48 tablet; Refill: 0  - CBC with platelets; Standing  - INR; Standing  - Comprehensive metabolic panel (BMP + Alb, Alk Phos, ALT, AST, Total. Bili, TP); Standing    3. Moderate malnutrition (H)    4. Weakness  - Physical Therapy Referral; Future  - Occupational Therapy Referral; Future    Venkatesh Glover MD    [facility]  Hospital/Nursing Home/IP Rehab Facility: [facility]  Date of Admission: 8/13/22  Date of Discharge: 8/19/22  Reason(s) for Admission: ETOH    Post Discharge Medication Reconciliation: completed by nurse and myself    Summary of hospitalization:  [facility] discharge summary reviewed and copied  below      Hospital Course     Uriel Soria is a 32 year old male with PMH of gout, ETOH abuse, cirrhosis. He was admitted 8/13/22 for severe ETOH withdrawal and alcoholic hepatitis. While waiting in ED for a medicine bed he clinically decompensated and became extremely agitated requiring IV benzodiazepines. He was upgraded to ICU level of care for IV BZDs and a precedex gtt. On 8/16 patient was no longer requiring IV benzodiazapine or dexmedetomidine; transferred to medicine service for continued management. The following problems were addressed during his hospitalization:     Severe ETOH withdrawal complicated with agitation and visual hallucinations consistent with delirium tremens, resolved  Alcohol use disorder   Anxiety   Insomnia  Reported drinking whiskey 0.5-0.75L/day. Cessation of use 8/12. No hx of seizures. Has not been through treatment before. Presented with tremors and visual hallucinations which deteriorated to agitation and violent behaviors directed at staff. He was upgraded to ICU level of care at that time for IV benzos and precedex gtt. Presentation consistent with delirium tremens. Stabilized with low CIWA scoring, not requiring Precedex, as of 8/15 PM. Transferred to [facility] medicine team 8/16 AM.  CIWA protocol remained low and he required no further BZDs. Chem dep and saw the patient on 8/15 for assessment and provided resources for CD treatment following discharge. Psych saw the patient 8/15 to discuss anxiety management and prescribed remeron. High-dose IV thiamine for prevention of Wernicke's encephalopathy was give due to recent decreased PO intake and patient's long history of heavy EtOH use. Patient's wife notes she has made an appointment for CD assessment. Patient did not desire IP treatment despite team's recommendations and concerns, due to family and work needs.  - Continue Gabapentin 300 mg TID  - Continue Thiamine 100 mg PO QD  - Continue Mirtazapine 15 mg PO QD  -  Melatonin 5 mg nightly PRN   - Trial PRN hydroxyzine 25 mg PRN QID   - Assessment for substance use disorder treatment - TBD     Decompensated ETOH cirrhosis with small volume ascites, hypoalbuminemia, and coagulation defect (MELD-Na+ 28 on admission)  Hyperbilirubinemia with elevations in ALP and AST   Thrombocytopenia  Normocytic anemia   Coagulation defect, elevated INR in the setting of cirrhosis   On admit, RUQ US w Doppler 8/13/22 revealed hepatomegaly with diffuse parenchymal echogenicity, reversal of flow throughout protal veins and splenic vein without thrombosis, splenomegaly, small volume ascites (therefore did not require paracentesis). Liver biopsy 6/21/21 (steatohepatitis) and MR Abd w/wo contrast 5/4/21. In June 2021, INR 1.28 and LFTs with total bili 5.9, , ALT 84, Alk Phos 111. Reports he has never had an EGD. Albumin load given 8/15. No concern for acute GI bleed. Due to ongoing hepatitis and synthetic dysfunction, started daily prednisolone 8/18 per hepatology - MELD labs recheck at hospital follow up 8/23 will determine further dosing/taper.     MELD-Na score: 26 at 8/19/2022  8:11 AM  MELD score: 26 at 8/19/2022  8:11 AM  Calculated from:  Serum Creatinine: 0.78 mg/dL (Using min of 1 mg/dL) at 8/19/2022  8:11 AM  Serum Sodium: 136 mmol/L at 8/19/2022  8:11 AM  Total Bilirubin: 21.2 mg/dL at 8/19/2022  8:11 AM  INR(ratio): 2.03 at 8/18/2022  9:15 AM  Age: 32 years     - Start Prednisone 40 mg PO daily per hepatology team  - MELD labs at clinic with PCP to calculate Lille score--[facility] hepatology to review and advise on further prednisone dosing and taper. They will be in contact with [facility] provider and patient.  - No need for hepatology clinic follow up at this time     Refeeding syndrome   Mild asymptomatic hyponatremia, resolved  Hypokalemia, resolved  Hypomagnesemia, resolved  Hypophosphatemia  Risk for hypoglycemia  Risk for malnutrition   Persistently required IV potassium and  phosphorus repletion as he advances his diet, concerning for refeeding syndrome. K as low as 2.7 on 8/16, stable at time of discharge at 3.7. Phos as low as 1.6 8/16 AM, was 1.8 at time of discharge. Dietician met with the patient to provide resources on high-potassium and phosphate foods.   - Phosphate 500mg PO TID for 7 days through PCP follow up  - Recommend CMP, phos recheck on 8/23     Bilateral lower extremity edema  Patient has had swelling of the feet and ankles since admission. Likely secondary to low serum proteins. S/p albumin repletion 8/15. As of 8/18 patient stated it was slowly resolving.   - Continue using compression stockings and elevation as able     Acute kidney injury, resolved  Patient had a jump in his Cr morning of 8/15 from 0.59 to 1.34. US completed 8/14 without any concerns for hydronephrosis, mass. Patient was voiding per nursing staff and did not feel bladder distention or pain. Creatinine normalized as of 8/16.     Cardiac murmur, best heard at R sternal border   Sinus tachycardia, intermittent, stable  Patient has been persistently tachycardic since arrival. 2/6 murmur was appreciated in the ED at the right sternal border. ECG and TTE in the ICU on 8/14 were normal. Repeat ECG on 8/18 was unchanged from previous and not concerning.   - No follow up needed      Leukocytosis, resolved  Rhinovirus  Cough, stable  Rhinovirus detected on viral screens on 8/14. COVID neg on 8/13.        Follow-ups Needed After Discharge      - Follow-up with PCP for hospital follow up and lab recheck early next week              - Recommended CBC, CMP, phos, INR -- ORDERED - patient will obtain before appointment time              - Discuss ongoing PO thiamine, folic acid, phosphate, multivitamin for alcohol use disorder              - Discuss ongoing anxiety treatment with new daily mirtazapine and PRN hydroxyzine. Interested in further daily  meds if anxiety not well controlled              - Inquire about  plans for alcohol treatment -- at high risk for returning to use and liver failure  - Hepatology will monitor above labs and advise re: continued steroids/taper  - Follow-up with substance use counseling              - assessment for treatment for substance use disorder        Diagnostic Tests/Treatments reviewed:      Other Healthcare Providers Involved in Patient's Care:      GI note from 8/23/33:  Acute liver failure without hepatic coma  Needs to establish with outpatient GI/Liver specialist. Wait to hear from the liver team regarding today's labs and instructions on your Prednisone (per the discharge summary).   - Adult GI  Referral - Consult Only; Future     Hypophosphatemia  Stable, continue oral  - Phosphorus  - Adult GI  Referral - Consult Only; Future     Alcoholic hepatitis without ascites     - INR  - CBC with platelets  - Comprehensive metabolic panel (BMP + Alb, Alk Phos, ALT, AST, Total. Bili, TP)  - Adult GI  Referral - Consult Only; Future     Alcohol dependence with withdrawal with perceptual disturbance (H)  Continue for now  - gabapentin (NEURONTIN) 300 MG capsule; Take 1 capsule (300 mg) by mouth 3 times daily     Anxiety  Stable, will continue meds, looking into addiction/rehab already- thinking [facility]  - mirtazapine (REMERON) 15 MG tablet; Take 1 tablet (15 mg) by mouth At Bedtime     Peripheral edema  Trial- instructed to ask liver team if any adverse affect on phos level and notify them this was ordered  - furosemide (LASIX) 20 MG tablet; Take 1 tablet (20 mg) by mouth daily    Update since discharge: improved.    Randy tells me he is doing great. He is eating much better and sleeping better. Not drinking. Tomorrow will be six weeks.     MELD score was 27 today 19.3% 3 months mortality         Review of Systems:   Complete ROS negative except as noted in the HPI            Physical Exam:   /58 (BP Location: Right arm, Patient Position: Sitting, Cuff Size:  "Adult Regular)   Pulse 114   Temp 98.1  F (36.7  C) (Temporal)   Resp 20   Ht 1.74 m (5' 8.5\")   Wt 79.5 kg (175 lb 4.8 oz)   SpO2 98%   BMI 26.27 kg/m      General appearance: Alert, cooperative, no distress, appears stated age  Head: Normocephalic, atraumatic, without obvious abnormality  Eyes: Pupils equal round, reactive.  Conjunctiva clear.  Nose: Nares normal, no drainage.  Throat: Lips, mucosa, tongue normal mucosa pink and moist  Neck: Supple, symmetric, trachea midline, no adenopathy.  No thyroid enlargement, tenderness or nodules.    Lungs: Clear to auscultation bilaterally, no wheezing or crackles present.  Respirations unlabored  Heart: Regular rate and rhythm, normal S1 and S2, no murmur, rub or gallop.  Abdomen: Soft, nontender, nondistended.  Bowel sounds active in all 4 quadrants.  No masses or organomegaly.  Extremities: Extremities normal, atraumatic.  No cyanosis or edema.  Skin: Skin color, texture, turgor normal no rashes or lesions on limited skin exam  Neurologic: CN II through XII intact, normal strength.    Answers for HPI/ROS submitted by the patient on 9/21/2022  Frequency of exercise:: 2-3 days/week  Getting at least 3 servings of Calcium per day:: Yes  Diet:: Regular (no restrictions)  Taking medications regularly:: Yes  Medication side effects:: Not applicable  Bi-annual eye exam:: NO  Dental care twice a year:: NO  Sleep apnea or symptoms of sleep apnea:: None  abdominal pain: No  Blood in stool: No  Blood in urine: No  chest pain: No  chills: No  congestion: No  constipation: No  cough: No  diarrhea: No  dizziness: No  ear pain: No  eye pain: No  nervous/anxious: No  fever: No  frequency: Yes  genital sores: No  headaches: Yes  hearing loss: No  heartburn: No  arthralgias: Yes  joint swelling: No  peripheral edema: No  mood changes: Yes  myalgias: No  nausea: No  dysuria: No  palpitations: No  Skin sensation changes: No  sore throat: No  urgency: Yes  rash: No  shortness of " breath: No  visual disturbance: No  weakness: No  impotence: Yes  penile discharge: No  Additional concerns today:: No  Duration of exercise:: 15-30 minutes

## 2022-09-22 NOTE — TELEPHONE ENCOUNTER
DIAGNOSIS: Alcoholic hepatitis without ascites, acute liver failure without hepatic coma   Appt Date: 10.17.2022   NOTES STATUS DETAILS   OFFICE NOTE from referring provider Internal 08.23.2022 Aparna Ramirez APRN CNP   OFFICE NOTES from other specialists Internal  09.21.2022 Venkatesh Garcia MD   DISCHARGE SUMMARY from hospital Internal 08.13.2022  Health   MEDICATION LIST Internal    LIVER BIOSPY (IF APPLICABLE)      PATHOLOGY REPORTS  Internal 06.21.2021 US Biopsy Liver   IMAGING     ENDOSCOPY (IF AVAILABLE)     COLONOSCOPY (IF AVAILABLE)     ULTRASOUND LIVER Internal 08.13.2022 US ABDOMEN COMPLETE WITH DOPPLER COMPLETE   CT OF ABDOMEN     MRI OF LIVER     FIBROSCAN, US ELASTOGRAPHY, FIBROSIS SCAN, MR ELASTOGRAPHY     LABS     HEPATIC PANEL (LIVER PANEL) Internal 08.19.2022   BASIC METABOLIC PANEL Internal 08.19.2022   COMPLETE METABOLIC PANEL Internal 09.20.2022   COMPLETE BLOOD COUNT (CBC) Internal 09.20.2022   INTERNATIONAL NORMALIZED RATIO (INR) Internal 08.23.3033   HEPATITIS C ANTIBODY Internal 08.13.2022   HEPATITIS C VIRAL LOAD/PCR     HEPATITIS C GENOTYPE     HEPATITIS B SURFACE ANTIGEN Internal 08.13.2022   HEPATITIS B SURFACE ANTIBODY Internal 08.13.2022   HEPATITIS B DNA QUANT LEVEL     HEPATITIS B CORE ANTIBODY Care Everywhere 04.16.2021

## 2022-09-27 ENCOUNTER — LAB (OUTPATIENT)
Dept: LAB | Facility: CLINIC | Age: 32
End: 2022-09-27
Payer: COMMERCIAL

## 2022-09-27 DIAGNOSIS — E83.39 HYPOPHOSPHATEMIA: ICD-10-CM

## 2022-09-27 DIAGNOSIS — K70.10 ALCOHOLIC HEPATITIS WITHOUT ASCITES (H): ICD-10-CM

## 2022-09-27 LAB
ALBUMIN SERPL BCG-MCNC: 3.3 G/DL (ref 3.5–5.2)
ALP SERPL-CCNC: 234 U/L (ref 40–129)
ALT SERPL W P-5'-P-CCNC: 83 U/L (ref 10–50)
ANION GAP SERPL CALCULATED.3IONS-SCNC: 13 MMOL/L (ref 7–15)
AST SERPL W P-5'-P-CCNC: 125 U/L (ref 10–50)
BILIRUB SERPL-MCNC: 10.3 MG/DL
BUN SERPL-MCNC: 11.3 MG/DL (ref 6–20)
CALCIUM SERPL-MCNC: 8.2 MG/DL (ref 8.6–10)
CHLORIDE SERPL-SCNC: 100 MMOL/L (ref 98–107)
CREAT SERPL-MCNC: 0.59 MG/DL (ref 0.67–1.17)
DEPRECATED HCO3 PLAS-SCNC: 23 MMOL/L (ref 22–29)
ERYTHROCYTE [DISTWIDTH] IN BLOOD BY AUTOMATED COUNT: 17 % (ref 10–15)
GFR SERPL CREATININE-BSD FRML MDRD: >90 ML/MIN/1.73M2
GLUCOSE SERPL-MCNC: 210 MG/DL (ref 70–99)
HCT VFR BLD AUTO: 29.5 % (ref 40–53)
HGB BLD-MCNC: 9.9 G/DL (ref 13.3–17.7)
MCH RBC QN AUTO: 29.4 PG (ref 26.5–33)
MCHC RBC AUTO-ENTMCNC: 33.6 G/DL (ref 31.5–36.5)
MCV RBC AUTO: 88 FL (ref 78–100)
PHOSPHATE SERPL-MCNC: 3.9 MG/DL (ref 2.5–4.5)
PLATELET # BLD AUTO: 153 10E3/UL (ref 150–450)
POTASSIUM SERPL-SCNC: 3.6 MMOL/L (ref 3.4–5.3)
PROT SERPL-MCNC: 6.7 G/DL (ref 6.4–8.3)
RBC # BLD AUTO: 3.37 10E6/UL (ref 4.4–5.9)
SODIUM SERPL-SCNC: 136 MMOL/L (ref 136–145)
WBC # BLD AUTO: 16.3 10E3/UL (ref 4–11)

## 2022-09-27 PROCEDURE — 85610 PROTHROMBIN TIME: CPT

## 2022-09-27 PROCEDURE — 84100 ASSAY OF PHOSPHORUS: CPT

## 2022-09-27 PROCEDURE — 85027 COMPLETE CBC AUTOMATED: CPT

## 2022-09-27 PROCEDURE — 36415 COLL VENOUS BLD VENIPUNCTURE: CPT

## 2022-09-27 PROCEDURE — 80053 COMPREHEN METABOLIC PANEL: CPT

## 2022-09-28 LAB — INR PPP: 2.44 (ref 0.85–1.15)

## 2022-09-28 NOTE — RESULT ENCOUNTER NOTE
Uriel,    Your lab results continue to steadily improve.  Including your liver and anemia.  Continue on your phosphorous and multivitamin    Venkatesh Glover MD

## 2022-09-29 NOTE — RESULT ENCOUNTER NOTE
Uriel,  Your INR came back today and is slightly worse. We will keep a close eye on this. Ultimately need GI input though at that appointment you have.    Venkatesh Glover MD

## 2022-09-30 DIAGNOSIS — F10.232 ALCOHOL DEPENDENCE WITH WITHDRAWAL WITH PERCEPTUAL DISTURBANCE (H): ICD-10-CM

## 2022-09-30 RX ORDER — GABAPENTIN 300 MG/1
300 CAPSULE ORAL 3 TIMES DAILY
Qty: 90 CAPSULE | Refills: 0 | Status: SHIPPED | OUTPATIENT
Start: 2022-09-30 | End: 2023-02-27

## 2022-09-30 NOTE — TELEPHONE ENCOUNTER
Requested Medication:   Pending Prescriptions:                       Disp   Refills    gabapentin (NEURONTIN) 300 MG capsule     90 cap*0            Sig: Take 1 capsule (300 mg) by mouth 3 times daily       Last Refill:  8/23/22    Last Office Visit:  9/21/2022     Next Appointment with Provider:  Visit date not found     Clinic visit frequency required: Q 6  months    Controlled substance agreement on file: No.    Urine Drug Screen on file:  No      Pt will be out of medication over the weekend.

## 2022-10-03 ENCOUNTER — HEALTH MAINTENANCE LETTER (OUTPATIENT)
Age: 32
End: 2022-10-03

## 2022-10-04 ENCOUNTER — LAB (OUTPATIENT)
Dept: LAB | Facility: CLINIC | Age: 32
End: 2022-10-04
Payer: COMMERCIAL

## 2022-10-04 DIAGNOSIS — K70.10 ALCOHOLIC HEPATITIS WITHOUT ASCITES (H): ICD-10-CM

## 2022-10-04 DIAGNOSIS — E83.39 HYPOPHOSPHATEMIA: ICD-10-CM

## 2022-10-04 LAB
ALBUMIN SERPL BCG-MCNC: 3.3 G/DL (ref 3.5–5.2)
ALP SERPL-CCNC: 197 U/L (ref 40–129)
ALT SERPL W P-5'-P-CCNC: 64 U/L (ref 10–50)
ANION GAP SERPL CALCULATED.3IONS-SCNC: 11 MMOL/L (ref 7–15)
AST SERPL W P-5'-P-CCNC: 116 U/L (ref 10–50)
BILIRUB SERPL-MCNC: 7.7 MG/DL
BUN SERPL-MCNC: 11.4 MG/DL (ref 6–20)
CALCIUM SERPL-MCNC: 8.3 MG/DL (ref 8.6–10)
CHLORIDE SERPL-SCNC: 102 MMOL/L (ref 98–107)
CREAT SERPL-MCNC: 0.68 MG/DL (ref 0.67–1.17)
DEPRECATED HCO3 PLAS-SCNC: 25 MMOL/L (ref 22–29)
ERYTHROCYTE [DISTWIDTH] IN BLOOD BY AUTOMATED COUNT: 17 % (ref 10–15)
GFR SERPL CREATININE-BSD FRML MDRD: >90 ML/MIN/1.73M2
GLUCOSE SERPL-MCNC: 109 MG/DL (ref 70–99)
HCT VFR BLD AUTO: 27.7 % (ref 40–53)
HGB BLD-MCNC: 9.3 G/DL (ref 13.3–17.7)
INR PPP: 2.04 (ref 0.85–1.15)
MCH RBC QN AUTO: 29 PG (ref 26.5–33)
MCHC RBC AUTO-ENTMCNC: 33.6 G/DL (ref 31.5–36.5)
MCV RBC AUTO: 86 FL (ref 78–100)
PHOSPHATE SERPL-MCNC: 3.8 MG/DL (ref 2.5–4.5)
PLATELET # BLD AUTO: 151 10E3/UL (ref 150–450)
POTASSIUM SERPL-SCNC: 3.4 MMOL/L (ref 3.4–5.3)
PROT SERPL-MCNC: 6.4 G/DL (ref 6.4–8.3)
RBC # BLD AUTO: 3.21 10E6/UL (ref 4.4–5.9)
SODIUM SERPL-SCNC: 138 MMOL/L (ref 136–145)
WBC # BLD AUTO: 15.1 10E3/UL (ref 4–11)

## 2022-10-04 PROCEDURE — 85027 COMPLETE CBC AUTOMATED: CPT

## 2022-10-04 PROCEDURE — 84100 ASSAY OF PHOSPHORUS: CPT

## 2022-10-04 PROCEDURE — 36415 COLL VENOUS BLD VENIPUNCTURE: CPT

## 2022-10-04 PROCEDURE — 85610 PROTHROMBIN TIME: CPT

## 2022-10-04 PROCEDURE — 80053 COMPREHEN METABOLIC PANEL: CPT

## 2022-10-04 RX ORDER — PREDNISONE 10 MG/1
40 TABLET ORAL DAILY
Qty: 48 TABLET | Refills: 0 | Status: SHIPPED | OUTPATIENT
Start: 2022-10-04 | End: 2022-10-14

## 2022-10-04 NOTE — TELEPHONE ENCOUNTER
Pending Prescriptions:                       Disp   Refills    predniSONE (DELTASONE) 10 MG tablet       48 tab*0            Sig: Take 4 tablets (40 mg) by mouth daily    phosphorus tablet 250 mg (PHOSPHA 250 JIGNA*63 tab*1            Sig: Take 2 tablets (500 mg) by mouth 3 times daily    Pt has one day left of medications.

## 2022-10-05 NOTE — RESULT ENCOUNTER NOTE
Uriel  Your results from your recent clinic visit show:  Your results are similar to last week. Your liver enzymes continue to slowly improve. Your hemoglobin is mildly worse but not a big enough change where I would recommend anything new.  Lets continue to monitor till you see hepatology.     If you have more questions please call the clinic at [phone] or send me a [employer] message    Dr. Venkatesh Glover

## 2022-10-11 ENCOUNTER — LAB (OUTPATIENT)
Dept: LAB | Facility: CLINIC | Age: 32
End: 2022-10-11
Payer: COMMERCIAL

## 2022-10-11 DIAGNOSIS — E83.39 HYPOPHOSPHATEMIA: ICD-10-CM

## 2022-10-11 DIAGNOSIS — K70.10 ALCOHOLIC HEPATITIS WITHOUT ASCITES (H): ICD-10-CM

## 2022-10-11 LAB
ALBUMIN SERPL BCG-MCNC: 3.3 G/DL (ref 3.5–5.2)
ALP SERPL-CCNC: 162 U/L (ref 40–129)
ALT SERPL W P-5'-P-CCNC: 55 U/L (ref 10–50)
ANION GAP SERPL CALCULATED.3IONS-SCNC: 11 MMOL/L (ref 7–15)
AST SERPL W P-5'-P-CCNC: 92 U/L (ref 10–50)
BILIRUB SERPL-MCNC: 5.8 MG/DL
BUN SERPL-MCNC: 11.1 MG/DL (ref 6–20)
CALCIUM SERPL-MCNC: 8.4 MG/DL (ref 8.6–10)
CHLORIDE SERPL-SCNC: 99 MMOL/L (ref 98–107)
CREAT SERPL-MCNC: 0.6 MG/DL (ref 0.67–1.17)
DEPRECATED HCO3 PLAS-SCNC: 23 MMOL/L (ref 22–29)
ERYTHROCYTE [DISTWIDTH] IN BLOOD BY AUTOMATED COUNT: 15.8 % (ref 10–15)
GFR SERPL CREATININE-BSD FRML MDRD: >90 ML/MIN/1.73M2
GLUCOSE SERPL-MCNC: 274 MG/DL (ref 70–99)
HCT VFR BLD AUTO: 29.4 % (ref 40–53)
HGB BLD-MCNC: 9.8 G/DL (ref 13.3–17.7)
MCH RBC QN AUTO: 28.7 PG (ref 26.5–33)
MCHC RBC AUTO-ENTMCNC: 33.3 G/DL (ref 31.5–36.5)
MCV RBC AUTO: 86 FL (ref 78–100)
PHOSPHATE SERPL-MCNC: 3.1 MG/DL (ref 2.5–4.5)
PLATELET # BLD AUTO: 131 10E3/UL (ref 150–450)
POTASSIUM SERPL-SCNC: 3.3 MMOL/L (ref 3.4–5.3)
PROT SERPL-MCNC: 6.5 G/DL (ref 6.4–8.3)
RBC # BLD AUTO: 3.41 10E6/UL (ref 4.4–5.9)
SODIUM SERPL-SCNC: 133 MMOL/L (ref 136–145)
WBC # BLD AUTO: 12.2 10E3/UL (ref 4–11)

## 2022-10-11 PROCEDURE — 80053 COMPREHEN METABOLIC PANEL: CPT

## 2022-10-11 PROCEDURE — 36415 COLL VENOUS BLD VENIPUNCTURE: CPT

## 2022-10-11 PROCEDURE — 84100 ASSAY OF PHOSPHORUS: CPT

## 2022-10-11 PROCEDURE — 85610 PROTHROMBIN TIME: CPT

## 2022-10-11 PROCEDURE — 85027 COMPLETE CBC AUTOMATED: CPT

## 2022-10-12 LAB
ATRIAL RATE - MUSE: 111 BPM
DIASTOLIC BLOOD PRESSURE - MUSE: NORMAL MMHG
INR PPP: 1.89 (ref 0.85–1.15)
INTERPRETATION ECG - MUSE: NORMAL
P AXIS - MUSE: 35 DEGREES
PR INTERVAL - MUSE: 136 MS
QRS DURATION - MUSE: 100 MS
QT - MUSE: 378 MS
QTC - MUSE: 514 MS
R AXIS - MUSE: 17 DEGREES
SYSTOLIC BLOOD PRESSURE - MUSE: NORMAL MMHG
T AXIS - MUSE: 0 DEGREES
VENTRICULAR RATE- MUSE: 111 BPM

## 2022-10-13 DIAGNOSIS — K70.10 ALCOHOLIC HEPATITIS WITHOUT ASCITES (H): ICD-10-CM

## 2022-10-13 DIAGNOSIS — F10.232 ALCOHOL DEPENDENCE WITH WITHDRAWAL WITH PERCEPTUAL DISTURBANCE (H): ICD-10-CM

## 2022-10-13 NOTE — TELEPHONE ENCOUNTER
Routing refill request to provider for review/approval because:  Drug not on the [facility] refill protocol     Last Written Prescription Date:  10/4/22  Last Fill Quantity: 48,  # refills: 0   Last office visit provider:  9/21/22     Requested Prescriptions   Pending Prescriptions Disp Refills     thiamine (B-1) 100 MG tablet 30 tablet 0     Sig: Take 1 tablet (100 mg) by mouth daily       There is no refill protocol information for this order        predniSONE (DELTASONE) 10 MG tablet 48 tablet 0     Sig: Take 4 tablets (40 mg) by mouth daily       There is no refill protocol information for this order      Outpatient Medication Detail     Disp Refills Start End BRIAN   thiamine (B-1) 100 MG tablet (Discontinued) 30 tablet 0 9/19/2022 9/21/2022 No   Sig - Route: Take 1 tablet (100 mg) by mouth daily - Oral   Sent to pharmacy as: Thiamine HCl 100 MG Oral Tablet (B-1)   Class: E-Prescribe   Order: [id number]   E-Prescribing Status: Receipt confirmed by pharmacy (9/19/2022 12:54 PM CDT)         Jenniffer Kaba, RN 10/13/22 6:31 PM

## 2022-10-13 NOTE — RESULT ENCOUNTER NOTE
Uriel,  Your results from your recent clinic visit show:  Your Labs are still slowly improving which I am happy to see. Nothing new to do    If you have more questions please call the clinic at [phone] or send me a [employer] message    Dr. Venkatesh Glover

## 2022-10-14 RX ORDER — LANOLIN ALCOHOL/MO/W.PET/CERES
100 CREAM (GRAM) TOPICAL DAILY
Qty: 30 TABLET | Refills: 0 | Status: SHIPPED | OUTPATIENT
Start: 2022-10-14 | End: 2022-10-17

## 2022-10-14 RX ORDER — PREDNISONE 10 MG/1
40 TABLET ORAL DAILY
Qty: 48 TABLET | Refills: 0 | Status: SHIPPED | OUTPATIENT
Start: 2022-10-14 | End: 2022-10-17

## 2022-10-17 ENCOUNTER — OFFICE VISIT (OUTPATIENT)
Dept: GASTROENTEROLOGY | Facility: CLINIC | Age: 32
End: 2022-10-17
Attending: NURSE PRACTITIONER
Payer: COMMERCIAL

## 2022-10-17 ENCOUNTER — PRE VISIT (OUTPATIENT)
Dept: GASTROENTEROLOGY | Facility: CLINIC | Age: 32
End: 2022-10-17

## 2022-10-17 VITALS
DIASTOLIC BLOOD PRESSURE: 89 MMHG | BODY MASS INDEX: 28.02 KG/M2 | SYSTOLIC BLOOD PRESSURE: 148 MMHG | TEMPERATURE: 98.8 F | OXYGEN SATURATION: 100 % | HEART RATE: 113 BPM | WEIGHT: 187 LBS

## 2022-10-17 DIAGNOSIS — K70.10 ALCOHOLIC HEPATITIS WITHOUT ASCITES (H): ICD-10-CM

## 2022-10-17 PROBLEM — K72.00 ACUTE LIVER FAILURE WITHOUT HEPATIC COMA: Status: RESOLVED | Noted: 2022-08-13 | Resolved: 2022-10-17

## 2022-10-17 PROCEDURE — 99205 OFFICE O/P NEW HI 60 MIN: CPT | Performed by: INTERNAL MEDICINE

## 2022-10-17 PROCEDURE — G0463 HOSPITAL OUTPT CLINIC VISIT: HCPCS

## 2022-10-17 RX ORDER — PREDNISONE 10 MG/1
30 TABLET ORAL DAILY
Qty: 1 TABLET | Refills: 0
Start: 2022-10-17 | End: 2023-02-27

## 2022-10-17 RX ORDER — FUROSEMIDE 20 MG
20 TABLET ORAL DAILY
Qty: 1 TABLET | Refills: 0
Start: 2022-10-17 | End: 2023-02-27

## 2022-10-17 ASSESSMENT — PAIN SCALES - GENERAL: PAINLEVEL: NO PAIN (0)

## 2022-10-17 NOTE — PROGRESS NOTES
[facility] Hepatology    New Patient Visit    Referring provider:  Aparna Ramirez      32 year old male    Chief complaint:  Alcoholic hepatitis    HPI:  The patient comes to clinic for further evaluation and management of alcoholic hepatitis.  The patient was admitted to hospital in 08/2022 with alcohol withdrawal, complicated with delirium tremens and acute alcoholic hepatitis, complicated with ascites.  The patient was started on prednisone 40mg, which has since been continued.  He was also referred to see [facility] but did not continue care with them.  The patient has not been to the ER or admitted to the hospital since discharge at that time.    Of note, the patient presented to the ER in [place] in 04/2021 with fatigue and was diagnosed with alcohol withdrawal.  Liver tests were noted to be abnormal and he was referred to see [facility].  He ultimately underwent a liver biopsy in 06/2021, which showed steatohepatitis, Britta hyaline and stage III hepatic fibrosis.    The patient is feeling better overall since discharge from hospital.  He continues to be jaundiced, but notices that his fluid retention has improved.    The patient denies abdominal distention, lower extremity edema, lethargy or confusion.    No history of melena, hematemesis or hematochezia.    The patient denies fever, sweats or chills.  He had COVID-19 confirmed by a home test in 2021.  He has not been vaccinated against COVID-19 as a personal choice.    The patient reports that his normal weight is around 210 pounds.  His weight tipped to 175 pounds following discharge from hospital, but is now back up to 187 pounds.  The patient has noticed some muscle loss in his legs and hips, which appears to be improving.  Appetite is increased, presumably due to steroids.    The patient last drank alcohol 9 weeks ago.  Prior to this, he was drinking 2-4 drinks of hard liquor every night.  The longest he has stopped drinking alcohol  for 2 months after being diagnosed with alcoholic hepatitis in 06/2021.  He has been drinking heavily since the age 28.  He has never attended AA, one-on-one counseling or rehab.  The patient declines referral to one-on-one counseling today.  The patient has never had a DUI.  He denies any other health-related problems due to alcohol.    The patient has never smoked.  He denies any illicit drug use including marijuana, IN or IV drugs.    Medical hx Surgical hx   Past Medical History:   Diagnosis Date     Alcohol use disorder, severe, in early remission (H)      Alcoholic hepatitis with ascites 08/2022     Alcoholic hepatitis without ascites 04/2021     Gout       Past Surgical History:   Procedure Laterality Date     PERCUTANEOUS LIVER BIOPSY  06/21/2021    ETOH hepatitis     WISDOM TOOTH EXTRACTION            Medications  Current Outpatient Medications   Medication Sig Dispense Refill     furosemide (LASIX) 20 MG tablet Take 1 tablet (20 mg) by mouth daily for 1 day 1 tablet 0     gabapentin (NEURONTIN) 300 MG capsule Take 1 capsule (300 mg) by mouth 3 times daily 90 capsule 0     melatonin 3 MG tablet Take 1 tablet (3 mg) by mouth nightly as needed for sleep 30 tablet 0     multivitamin w/minerals (THERA-VIT-M) tablet Take 1 tablet by mouth daily 30 tablet 0     predniSONE (DELTASONE) 10 MG tablet Take 3 tablets (30 mg) by mouth daily 1 tablet 0       Allergies  No Known Allergies    Family hx Social hx   Family History   Problem Relation Age of Onset     Breast Cancer Maternal Grandmother      Coronary Artery Disease Maternal Grandmother      Colon Cancer Paternal Grandmother 70     Liver Disease No family hx of       Social History     Tobacco Use     Smoking status: Never     Smokeless tobacco: Former     Types: Chew   Substance Use Topics     Alcohol use: Not Currently     Drug use: Not Currently     Lives in [place] with wife and 2 kids, aged 2.5 & 4, all healthy.  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     Review of systems  A 10-point review of systems was negative.    Examination  BP (!) 148/89   Pulse 113   Temp 98.8  F (37.1  C) (Oral)   Wt 84.8 kg (187 lb)   SpO2 100%   BMI 28.02 kg/m    Body mass index is 28.02 kg/m .    Gen- well, NAD, A+Ox3, jaundiced  Eye- EOMI, scleral icterus  ENT- MMM, normal oropharynx  Lym- no palpable lymphadenopathy  CVS- S1, S2 normal, no added sounds, RRR  RS- CTA  Abd- striae, soft, non-tender, palpable liver edge, no ascites or splenomegaly on palpation or percussion, BS+  Extr- pulses good, no BISI  MS- mild patchy palmar erythema, hands otherwise normal- no clubbing or dupuytren's contracture  Neuro- A+Ox3, no asterixis  Skin- jaundiced, telangiectasia on chest wall  Psych- normal mood    Laboratory  Lab Results   Component Value Date     10/11/2022    POTASSIUM 3.3 10/11/2022    POTASSIUM 3.5 08/23/2022    CHLORIDE 99 10/11/2022    CHLORIDE 103 08/23/2022    CO2 23 10/11/2022    CO2 23 08/23/2022    BUN 11.1 10/11/2022    BUN 13 08/23/2022    CR 0.60 10/11/2022       Lab Results   Component Value Date    BILITOTAL 5.8 10/11/2022    ALT 55 10/11/2022    AST 92 10/11/2022    ALKPHOS 162 10/11/2022       Lab Results   Component Value Date    ALBUMIN 3.3 10/11/2022    ALBUMIN 2.4 08/23/2022    PROTTOTAL 6.5 10/11/2022        Lab Results   Component Value Date    WBC 12.2 10/11/2022    HGB 9.8 10/11/2022    MCV 86 10/11/2022     10/11/2022       Lab Results   Component Value Date    INR 1.89 10/11/2022      08/13/22 14:18 08/13/22 20:03   Hep B Surface Agn  Nonreactive   Hepatitis A Antibody IgG Nonreactive    Hepatitis C Antibody  Nonreactive       Liver biopsy 6/21/2022 reviewed    Radiology  Abd U/S Aug 2022 reviewed    Assessment  32-year-old male who presents to establish care for severe alcohol-related hepatitis complicated with ascites.  MELD-Na= 23.  Last ETOH= Aug 2022.  No evidence of ascites or hepatic encephalopathy.  Will reduce  dose of diuretics for lower extremity edema.  Will taper prednisone and discontinue phosphate and thiamine supplements.  The patient declines referral for one-on-one counseling for relapse prevention for alcohol use disorder.  The patient may need a liver biopsy in the future as it is not clear if he has cirrhosis.    We discussed the pathophysiology and complications and management of alcohol-related liver disease (and alcohol use disorder), and the importance of complete abstinence from alcohol with regards to improvement and preservation of liver health.    Plan  1.  Complete abstinence from alcohol  2.  Low Na diet  3.  Taper off prednisone by 10mg every week  4.  Reduce furosemide to 20mg PO Q24  5.  Stop phosphate and thiamine supplements  6.  Check LFTs every month  7.  Follow-up in 3 months    Mario Villa MD  Hepatology  [facility]    I spent 60 minutes on the date of the encounter doing chart review, history and exam, documentation and further activities as noted above.

## 2022-10-17 NOTE — PATIENT INSTRUCTIONS
Plan  No alcohol- you cannot drink alcohol ever again  You should get help with staying sober from alcohol to prevent relapse- [facility] or we can refer you for counseling at [facility]  Reduce prednisone by one tab every week- take 3 tabs this week, 2 tabs next week etc until no tabs  Reduce furosemide to one tab per day  Stop thiamine (B-1) and phosphorus supplements  Check blood work every month  See me in 3 months    You may need a liver biopsy in the future to see if you have cirrhosis (after the initial inflammation calms down)    Mario Villa MD  Hepatology

## 2022-10-17 NOTE — LETTER
10/17/2022         RE: Uriel Soria  [address] [address]        Dear Colleague,    Thank you for referring your patient, Uriel Soria, to the [facility] HEPATOLOGY CLINIC [place]. Please see a copy of my visit note below.    [facility] Hepatology    New Patient Visit    Referring provider:  Aparna Ramirez      32 year old male    Chief complaint:  Alcoholic hepatitis    HPI:  The patient comes to clinic for further evaluation and management of alcoholic hepatitis.  The patient was admitted to hospital in 08/2022 with alcohol withdrawal, complicated with delirium tremens and acute alcoholic hepatitis, complicated with ascites.  The patient was started on prednisone 40mg, which has since been continued.  He was also referred to see [facility] but did not continue care with them.  The patient has not been to the ER or admitted to the hospital since discharge at that time.    Of note, the patient presented to the ER in [place] in 04/2021 with fatigue and was diagnosed with alcohol withdrawal.  Liver tests were noted to be abnormal and he was referred to see [facility].  He ultimately underwent a liver biopsy in 06/2021, which showed steatohepatitis, Britta hyaline and stage III hepatic fibrosis.    The patient is feeling better overall since discharge from hospital.  He continues to be jaundiced, but notices that his fluid retention has improved.    The patient denies abdominal distention, lower extremity edema, lethargy or confusion.    No history of melena, hematemesis or hematochezia.    The patient denies fever, sweats or chills.  He had COVID-19 confirmed by a home test in 2021.  He has not been vaccinated against COVID-19 as a personal choice.    The patient reports that his normal weight is around 210 pounds.  His weight tipped to 175 pounds following discharge from hospital, but is now back up to 187 pounds.  The patient has noticed some muscle loss in his  legs and hips, which appears to be improving.  Appetite is increased, presumably due to steroids.    The patient last drank alcohol 9 weeks ago.  Prior to this, he was drinking 2-4 drinks of hard liquor every night.  The longest he has stopped drinking alcohol for 2 months after being diagnosed with alcoholic hepatitis in 06/2021.  He has been drinking heavily since the age 28.  He has never attended AA, one-on-one counseling or rehab.  The patient declines referral to one-on-one counseling today.  The patient has never had a DUI.  He denies any other health-related problems due to alcohol.    The patient has never smoked.  He denies any illicit drug use including marijuana, IN or IV drugs.    Medical hx Surgical hx   Past Medical History:   Diagnosis Date     Alcohol use disorder, severe, in early remission (H)      Alcoholic hepatitis with ascites 08/2022     Alcoholic hepatitis without ascites 04/2021     Gout       Past Surgical History:   Procedure Laterality Date     PERCUTANEOUS LIVER BIOPSY  06/21/2021    ETOH hepatitis     WISDOM TOOTH EXTRACTION            Medications  Current Outpatient Medications   Medication Sig Dispense Refill     furosemide (LASIX) 20 MG tablet Take 1 tablet (20 mg) by mouth daily for 1 day 1 tablet 0     gabapentin (NEURONTIN) 300 MG capsule Take 1 capsule (300 mg) by mouth 3 times daily 90 capsule 0     melatonin 3 MG tablet Take 1 tablet (3 mg) by mouth nightly as needed for sleep 30 tablet 0     multivitamin w/minerals (THERA-VIT-M) tablet Take 1 tablet by mouth daily 30 tablet 0     predniSONE (DELTASONE) 10 MG tablet Take 3 tablets (30 mg) by mouth daily 1 tablet 0       Allergies  No Known Allergies    Family hx Social hx   Family History   Problem Relation Age of Onset     Breast Cancer Maternal Grandmother      Coronary Artery Disease Maternal Grandmother      Colon Cancer Paternal Grandmother 70     Liver Disease No family hx of       Social History     Tobacco Use      Smoking status: Never     Smokeless tobacco: Former     Types: Chew   Substance Use Topics     Alcohol use: Not Currently     Drug use: Not Currently     Lives in [place] with wife and 2 kids, aged 2.5 & 4, all healthy.  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     Review of systems  A 10-point review of systems was negative.    Examination  BP (!) 148/89   Pulse 113   Temp 98.8  F (37.1  C) (Oral)   Wt 84.8 kg (187 lb)   SpO2 100%   BMI 28.02 kg/m    Body mass index is 28.02 kg/m .    Gen- well, NAD, A+Ox3, jaundiced  Eye- EOMI, scleral icterus  ENT- MMM, normal oropharynx  Lym- no palpable lymphadenopathy  CVS- S1, S2 normal, no added sounds, RRR  RS- CTA  Abd- striae, soft, non-tender, palpable liver edge, no ascites or splenomegaly on palpation or percussion, BS+  Extr- pulses good, no BISI  MS- mild patchy palmar erythema, hands otherwise normal- no clubbing or dupuytren's contracture  Neuro- A+Ox3, no asterixis  Skin- jaundiced, telangiectasia on chest wall  Psych- normal mood    Laboratory  Lab Results   Component Value Date     10/11/2022    POTASSIUM 3.3 10/11/2022    POTASSIUM 3.5 08/23/2022    CHLORIDE 99 10/11/2022    CHLORIDE 103 08/23/2022    CO2 23 10/11/2022    CO2 23 08/23/2022    BUN 11.1 10/11/2022    BUN 13 08/23/2022    CR 0.60 10/11/2022       Lab Results   Component Value Date    BILITOTAL 5.8 10/11/2022    ALT 55 10/11/2022    AST 92 10/11/2022    ALKPHOS 162 10/11/2022       Lab Results   Component Value Date    ALBUMIN 3.3 10/11/2022    ALBUMIN 2.4 08/23/2022    PROTTOTAL 6.5 10/11/2022        Lab Results   Component Value Date    WBC 12.2 10/11/2022    HGB 9.8 10/11/2022    MCV 86 10/11/2022     10/11/2022       Lab Results   Component Value Date    INR 1.89 10/11/2022      08/13/22 14:18 08/13/22 20:03   Hep B Surface Agn  Nonreactive   Hepatitis A Antibody IgG Nonreactive    Hepatitis C Antibody  Nonreactive       Liver biopsy 6/21/2022  reviewed    Radiology  Abd U/S Aug 2022 reviewed    Assessment  32-year-old male who presents to establish care for severe alcohol-related hepatitis complicated with ascites.  MELD-Na= 23.  Last ETOH= Aug 2022.  No evidence of ascites or hepatic encephalopathy.  Will reduce dose of diuretics for lower extremity edema.  Will taper prednisone and discontinue phosphate and thiamine supplements.  The patient declines referral for one-on-one counseling for relapse prevention for alcohol use disorder.  The patient may need a liver biopsy in the future as it is not clear if he has cirrhosis.    We discussed the pathophysiology and complications and management of alcohol-related liver disease (and alcohol use disorder), and the importance of complete abstinence from alcohol with regards to improvement and preservation of liver health.    Plan  1.  Complete abstinence from alcohol  2.  Low Na diet  3.  Taper off prednisone by 10mg every week  4.  Reduce furosemide to 20mg PO Q24  5.  Stop phosphate and thiamine supplements  6.  Check LFTs every month  7.  Follow-up in 3 months      Mario Villa MD  Hepatology  [facility]    I spent 60 minutes on the date of the encounter doing chart review, history and exam, documentation and further activities as noted above.

## 2022-10-17 NOTE — NURSING NOTE
Chief Complaint   Patient presents with     New Patient       BP (!) 148/89   Pulse 113   Temp 98.8  F (37.1  C) (Oral)   Wt 84.8 kg (187 lb)   SpO2 100%   BMI 28.02 kg/m      Bucky Phillips on 10/17/2022 at 8:01 AM

## 2022-10-18 PROBLEM — E83.39 HYPOPHOSPHATEMIA: Status: RESOLVED | Noted: 2022-08-19 | Resolved: 2022-10-18

## 2022-10-18 PROBLEM — N17.9 ACUTE KIDNEY FAILURE, UNSPECIFIED (H): Status: RESOLVED | Noted: 2022-08-15 | Resolved: 2022-10-18

## 2022-10-18 PROBLEM — K70.10 ALCOHOLIC HEPATITIS WITHOUT ASCITES (H): Status: ACTIVE | Noted: 2022-10-18

## 2022-12-02 ENCOUNTER — LAB (OUTPATIENT)
Dept: LAB | Facility: CLINIC | Age: 32
End: 2022-12-02
Payer: COMMERCIAL

## 2022-12-02 DIAGNOSIS — K70.10 ALCOHOLIC HEPATITIS WITHOUT ASCITES (H): ICD-10-CM

## 2022-12-02 LAB
ALBUMIN SERPL-MCNC: 3.3 G/DL (ref 3.4–5)
ALP SERPL-CCNC: 111 U/L (ref 40–150)
ALT SERPL W P-5'-P-CCNC: 26 U/L (ref 0–70)
AST SERPL W P-5'-P-CCNC: 46 U/L (ref 0–45)
BILIRUB DIRECT SERPL-MCNC: 0.7 MG/DL (ref 0–0.2)
BILIRUB SERPL-MCNC: 1.4 MG/DL (ref 0.2–1.3)
PROT SERPL-MCNC: 7.9 G/DL (ref 6.8–8.8)

## 2022-12-02 PROCEDURE — 36415 COLL VENOUS BLD VENIPUNCTURE: CPT

## 2022-12-02 PROCEDURE — 86706 HEP B SURFACE ANTIBODY: CPT

## 2022-12-02 PROCEDURE — 80076 HEPATIC FUNCTION PANEL: CPT

## 2022-12-03 LAB
HBV SURFACE AB SERPL IA-ACNC: 0.4 M[IU]/ML
HBV SURFACE AB SERPL IA-ACNC: NONREACTIVE M[IU]/ML

## 2022-12-22 ENCOUNTER — TELEPHONE (OUTPATIENT)
Dept: GASTROENTEROLOGY | Facility: CLINIC | Age: 32
End: 2022-12-22

## 2023-01-09 ENCOUNTER — TELEPHONE (OUTPATIENT)
Dept: GASTROENTEROLOGY | Facility: CLINIC | Age: 33
End: 2023-01-09

## 2023-01-09 ENCOUNTER — LAB (OUTPATIENT)
Dept: LAB | Facility: CLINIC | Age: 33
End: 2023-01-09
Payer: COMMERCIAL

## 2023-01-09 DIAGNOSIS — K70.10 ALCOHOLIC HEPATITIS WITHOUT ASCITES (H): ICD-10-CM

## 2023-01-09 PROCEDURE — 80076 HEPATIC FUNCTION PANEL: CPT

## 2023-01-09 PROCEDURE — 36415 COLL VENOUS BLD VENIPUNCTURE: CPT

## 2023-01-09 NOTE — TELEPHONE ENCOUNTER
Returned patient's call.  Let him know he is due for lab work as standing orders are for monthly hepatic panel and his last lab was 12/02.  Patient will call and set up lab.  He will also be due for labs at next office visit with Dr. Villa in February.    [facility] Call Center    Phone Message    May a detailed message be left on voicemail: yes     Reason for Call: Other: Pt requested a call back in regards to labs. Pt asks does he needs labs drawn before his appt? Pt stated he would get these drawn on a weekly basis, but it has been some time since he last had labs drawn. Please call pt back to inform if he needs labs at this number [phone]. Thank you.      Action Taken: Other: hepa    Travel Screening: Not Applicable

## 2023-01-10 LAB
ALBUMIN SERPL-MCNC: 3.6 G/DL (ref 3.4–5)
ALP SERPL-CCNC: 120 U/L (ref 40–150)
ALT SERPL W P-5'-P-CCNC: 26 U/L (ref 0–70)
AST SERPL W P-5'-P-CCNC: 41 U/L (ref 0–45)
BILIRUB DIRECT SERPL-MCNC: 0.4 MG/DL (ref 0–0.2)
BILIRUB SERPL-MCNC: 1.2 MG/DL (ref 0.2–1.3)
PROT SERPL-MCNC: 7.9 G/DL (ref 6.8–8.8)

## 2023-02-15 ENCOUNTER — LAB (OUTPATIENT)
Dept: LAB | Facility: CLINIC | Age: 33
End: 2023-02-15
Payer: COMMERCIAL

## 2023-02-15 DIAGNOSIS — K70.10 ALCOHOLIC HEPATITIS WITHOUT ASCITES (H): ICD-10-CM

## 2023-02-15 LAB
ALBUMIN SERPL-MCNC: 3.7 G/DL (ref 3.4–5)
ALP SERPL-CCNC: 102 U/L (ref 40–150)
ALT SERPL W P-5'-P-CCNC: 33 U/L (ref 0–70)
AST SERPL W P-5'-P-CCNC: 45 U/L (ref 0–45)
BILIRUB DIRECT SERPL-MCNC: 0.4 MG/DL (ref 0–0.2)
BILIRUB SERPL-MCNC: 1.7 MG/DL (ref 0.2–1.3)
PROT SERPL-MCNC: 7.8 G/DL (ref 6.8–8.8)

## 2023-02-15 PROCEDURE — 36415 COLL VENOUS BLD VENIPUNCTURE: CPT

## 2023-02-15 PROCEDURE — 80076 HEPATIC FUNCTION PANEL: CPT

## 2023-02-27 ENCOUNTER — OFFICE VISIT (OUTPATIENT)
Dept: GASTROENTEROLOGY | Facility: CLINIC | Age: 33
End: 2023-02-27
Attending: INTERNAL MEDICINE
Payer: COMMERCIAL

## 2023-02-27 VITALS
TEMPERATURE: 98.2 F | OXYGEN SATURATION: 100 % | HEART RATE: 105 BPM | DIASTOLIC BLOOD PRESSURE: 90 MMHG | BODY MASS INDEX: 28.15 KG/M2 | SYSTOLIC BLOOD PRESSURE: 142 MMHG | WEIGHT: 187.9 LBS

## 2023-02-27 DIAGNOSIS — K70.10 ALCOHOLIC HEPATITIS WITHOUT ASCITES (H): Primary | ICD-10-CM

## 2023-02-27 PROCEDURE — G0463 HOSPITAL OUTPT CLINIC VISIT: HCPCS | Performed by: INTERNAL MEDICINE

## 2023-02-27 PROCEDURE — 99214 OFFICE O/P EST MOD 30 MIN: CPT | Performed by: INTERNAL MEDICINE

## 2023-02-27 ASSESSMENT — PAIN SCALES - GENERAL: PAINLEVEL: NO PAIN (0)

## 2023-02-27 NOTE — PROGRESS NOTES
[facility] Hepatology    Follow-up Visit    Follow-up visit for alcohol-associated hepatitis    Subjective:  32 year old male with history of severe alcohol-related hepatitis complicated by ascites who presents for routine follow-up. Last seen Oct 2022 in hepatology for this issue, was successfully weaned off prednisone, Lasix, and thiamine/phosphate at that time.  No further ED visits or medication changes since last follow up.    Denies any BISI or ascites with improved appetite and modest weight gain of 1-2 pounds. Energy level good.     Patient denies jaundice, excessive bruising, lower extremity edema, abdominal distension, lethargy or confusion.    Patient denies melena, hematemesis or hematochezia.    Patient denies fevers, sweats or chills. Denies any pain.    Has remained abstinent from EtOH since August 2022 when he was last hospitalized for hepatitis.     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      Medical hx Surgical hx   Past Medical History:   Diagnosis Date    Alcohol use disorder, severe, in early remission (H)     Alcoholic hepatitis with ascites 08/2022    Alcoholic hepatitis without ascites 04/2021    Gout       Past Surgical History:   Procedure Laterality Date    PERCUTANEOUS LIVER BIOPSY  06/21/2021    ETOH hepatitis    WISDOM TOOTH EXTRACTION            Medications  Prior to Admission medications    Reviewed- nil regular       Allergies  No Known Allergies    Review of systems  A 10-point review of systems was negative    Examination  BP (!) 142/90   Pulse 105   Temp 98.2  F (36.8  C)   Wt 85.2 kg (187 lb 14.4 oz)   SpO2 100%   BMI 28.15 kg/m    Body mass index is 28.15 kg/m .    Gen- well, NAD, A+Ox3, normal color  CVS- tachycardia, regular rhythm  RS- CTA  Abd- soft, non-tender, non-distended, hepatomegaly, no splenomegaly  Extr- hands normal, no BISI  Skin- no rash or jaundice  Neuro- no asterixis  Psych- normal  mood    Laboratory  Lab Results   Component Value Date     10/11/2022    POTASSIUM 3.3 10/11/2022    POTASSIUM 3.5 08/23/2022    CHLORIDE 99 10/11/2022    CHLORIDE 103 08/23/2022    CO2 23 10/11/2022    CO2 23 08/23/2022    BUN 11.1 10/11/2022    BUN 13 08/23/2022    CR 0.60 10/11/2022       Lab Results   Component Value Date    BILITOTAL 1.7 02/15/2023    ALT 33 02/15/2023    AST 45 02/15/2023    ALKPHOS 102 02/15/2023       Lab Results   Component Value Date    ALBUMIN 3.7 02/15/2023    PROTTOTAL 7.8 02/15/2023        Lab Results   Component Value Date    WBC 12.2 10/11/2022    HGB 9.8 10/11/2022    MCV 86 10/11/2022     10/11/2022       Lab Results   Component Value Date    INR 1.89 10/11/2022       Radiology  Reviewed    Assessment  32-year-old male who for follow up severe alcohol-related hepatitis, now without ascites after many months since weaned off prednisone and Lasix. Last MELD-Na= 23 in October 2022.  Last ETOH= Aug 2022.  No evidence of ascites or hepatic encephalopathy. Monthly hepatic function tests have shown normalization of most LFTs with slight increase in t bili of 1.7. The patient may need a liver biopsy in the future as it is still not clear if he has underlying cirrhosis - we will assess in 6+ at next follow up visit with labs.    Plan  1.  Continue complete abstinence from alcohol  2.  Continue low Na diet  3.  Check CMP, INR, CBC at next follow-up visit (no need for interim labs unless new symptoms develop)  4.  Follow-up in 6+ months with labs    Vazquez Jj, MS4  [employer]

## 2023-02-27 NOTE — NURSING NOTE
Chief Complaint   Patient presents with     RECHECK     Return visit.     Blood pressure (!) 142/90, pulse 105, temperature 98.2  F (36.8  C), weight 85.2 kg (187 lb 14.4 oz), SpO2 100 %.    WALKER YOU

## 2023-02-27 NOTE — LETTER
2/27/2023         RE: Uriel Soria  [address] [address]        Dear Colleague,    Thank you for referring your patient, Uriel Soria, to the [facility] HEPATOLOGY CLINIC [place]. Please see a copy of my visit note below.    [facility] Hepatology    Follow-up Visit    Follow-up visit for alcohol-associated hepatitis    Subjective:  32 year old male with history of severe alcohol-related hepatitis complicated by ascites who presents for routine follow-up. Last seen Oct 2022 in hepatology for this issue, was successfully weaned off prednisone, Lasix, and thiamine/phosphate at that time.  No further ED visits or medication changes since last follow up.    Denies any BISI or ascites with improved appetite and modest weight gain of 1-2 pounds. Energy level good.     Patient denies jaundice, excessive bruising, lower extremity edema, abdominal distension, lethargy or confusion.    Patient denies melena, hematemesis or hematochezia.    Patient denies fevers, sweats or chills. Denies any pain.    Has remained abstinent from EtOH since August 2022 when he was last hospitalized for hepatitis.     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      Medical hx Surgical hx   Past Medical History:   Diagnosis Date     Alcohol use disorder, severe, in early remission (H)      Alcoholic hepatitis with ascites 08/2022     Alcoholic hepatitis without ascites 04/2021     Gout       Past Surgical History:   Procedure Laterality Date     PERCUTANEOUS LIVER BIOPSY  06/21/2021    ETOH hepatitis     WISDOM TOOTH EXTRACTION            Medications  Prior to Admission medications    Reviewed- nil regular       Allergies  No Known Allergies    Review of systems  A 10-point review of systems was negative    Examination  BP (!) 142/90   Pulse 105   Temp 98.2  F (36.8  C)   Wt 85.2 kg (187 lb 14.4 oz)   SpO2 100%   BMI 28.15 kg/m    Body mass index is 28.15  kg/m .    Gen- well, NAD, A+Ox3, normal color  CVS- tachycardia, regular rhythm  RS- CTA  Abd- soft, non-tender, non-distended, hepatomegaly, no splenomegaly  Extr- hands normal, no BISI  Skin- no rash or jaundice  Neuro- no asterixis  Psych- normal mood    Laboratory  Lab Results   Component Value Date     10/11/2022    POTASSIUM 3.3 10/11/2022    POTASSIUM 3.5 08/23/2022    CHLORIDE 99 10/11/2022    CHLORIDE 103 08/23/2022    CO2 23 10/11/2022    CO2 23 08/23/2022    BUN 11.1 10/11/2022    BUN 13 08/23/2022    CR 0.60 10/11/2022       Lab Results   Component Value Date    BILITOTAL 1.7 02/15/2023    ALT 33 02/15/2023    AST 45 02/15/2023    ALKPHOS 102 02/15/2023       Lab Results   Component Value Date    ALBUMIN 3.7 02/15/2023    PROTTOTAL 7.8 02/15/2023        Lab Results   Component Value Date    WBC 12.2 10/11/2022    HGB 9.8 10/11/2022    MCV 86 10/11/2022     10/11/2022       Lab Results   Component Value Date    INR 1.89 10/11/2022       Radiology  Reviewed    Assessment  32-year-old male who for follow up severe alcohol-related hepatitis, now without ascites after many months since weaned off prednisone and Lasix. Last MELD-Na= 23 in October 2022.  Last ETOH= Aug 2022.  No evidence of ascites or hepatic encephalopathy. Monthly hepatic function tests have shown normalization of most LFTs with slight increase in t bili of 1.7. The patient may need a liver biopsy in the future as it is still not clear if he has underlying cirrhosis - we will assess in 6+ at next follow up visit with labs.    Plan  1.  Continue complete abstinence from alcohol  2.  Continue low Na diet  3.  Check CMP, INR, CBC at next follow-up visit (no need for interim labs unless new symptoms develop)  4.  Follow-up in 6+ months with labs    Vazquez Jj, MS4  [employer] [facility] [employer] [facility] [employer]    Attestation with edits by Mario Johnson MD at 2/27/2023  2:04 PM:  Physician Attestation  I, Mario Villa MD, was  present with the medical/BRIAN student who participated in the service and in the documentation of the note.  I have verified the history and personally performed the physical exam and medical decision making.  I agree with the assessment and plan of care as documented in the note.      Items personally reviewed: vitals, labs, and imaging and agree with the interpretation documented in the note.    Patient doing well off steroids and diuretics.  No recurrence of fluid overload symptoms.  No alcohol since Aug 2022.  LFTs still mildly elevated and palpable hepatomegaly on physical examination.  Will continue to monitor clinically for now- we discussed utility of liver biopsy if LFTs+/- physical exam remain abnormal at next visit.        Again, thank you for allowing me to participate in the care of your patient.      Sincerely,    Mario Villa MD

## 2023-10-17 ENCOUNTER — LAB (OUTPATIENT)
Dept: LAB | Facility: CLINIC | Age: 33
End: 2023-10-17
Attending: INTERNAL MEDICINE
Payer: COMMERCIAL

## 2023-10-17 ENCOUNTER — OFFICE VISIT (OUTPATIENT)
Dept: GASTROENTEROLOGY | Facility: CLINIC | Age: 33
End: 2023-10-17
Attending: INTERNAL MEDICINE
Payer: COMMERCIAL

## 2023-10-17 VITALS
OXYGEN SATURATION: 100 % | TEMPERATURE: 98.4 F | BODY MASS INDEX: 28.05 KG/M2 | HEART RATE: 102 BPM | WEIGHT: 187.2 LBS | SYSTOLIC BLOOD PRESSURE: 142 MMHG | DIASTOLIC BLOOD PRESSURE: 99 MMHG

## 2023-10-17 DIAGNOSIS — K70.10 ALCOHOLIC HEPATITIS WITHOUT ASCITES (H): ICD-10-CM

## 2023-10-17 DIAGNOSIS — K70.10 ALCOHOLIC HEPATITIS WITHOUT ASCITES (H): Primary | ICD-10-CM

## 2023-10-17 LAB
ALBUMIN SERPL BCG-MCNC: 4.7 G/DL (ref 3.5–5.2)
ALP SERPL-CCNC: 101 U/L (ref 40–129)
ALT SERPL W P-5'-P-CCNC: 36 U/L (ref 0–70)
ANION GAP SERPL CALCULATED.3IONS-SCNC: 9 MMOL/L (ref 7–15)
AST SERPL W P-5'-P-CCNC: 32 U/L (ref 0–45)
BILIRUB DIRECT SERPL-MCNC: 0.33 MG/DL (ref 0–0.3)
BILIRUB SERPL-MCNC: 1.5 MG/DL
BUN SERPL-MCNC: 11.1 MG/DL (ref 6–20)
CALCIUM SERPL-MCNC: 10 MG/DL (ref 8.6–10)
CHLORIDE SERPL-SCNC: 104 MMOL/L (ref 98–107)
CREAT SERPL-MCNC: 0.89 MG/DL (ref 0.67–1.17)
DEPRECATED HCO3 PLAS-SCNC: 27 MMOL/L (ref 22–29)
EGFRCR SERPLBLD CKD-EPI 2021: >90 ML/MIN/1.73M2
ERYTHROCYTE [DISTWIDTH] IN BLOOD BY AUTOMATED COUNT: 12.7 % (ref 10–15)
GLUCOSE SERPL-MCNC: 115 MG/DL (ref 70–99)
HCT VFR BLD AUTO: 43.7 % (ref 40–53)
HGB BLD-MCNC: 14.8 G/DL (ref 13.3–17.7)
INR PPP: 1.32 (ref 0.85–1.15)
MCH RBC QN AUTO: 28.1 PG (ref 26.5–33)
MCHC RBC AUTO-ENTMCNC: 33.9 G/DL (ref 31.5–36.5)
MCV RBC AUTO: 83 FL (ref 78–100)
PLATELET # BLD AUTO: 142 10E3/UL (ref 150–450)
POTASSIUM SERPL-SCNC: 4.1 MMOL/L (ref 3.4–5.3)
PROT SERPL-MCNC: 8.3 G/DL (ref 6.4–8.3)
RBC # BLD AUTO: 5.26 10E6/UL (ref 4.4–5.9)
SODIUM SERPL-SCNC: 140 MMOL/L (ref 135–145)
WBC # BLD AUTO: 9.2 10E3/UL (ref 4–11)

## 2023-10-17 PROCEDURE — 82248 BILIRUBIN DIRECT: CPT | Performed by: PATHOLOGY

## 2023-10-17 PROCEDURE — 36415 COLL VENOUS BLD VENIPUNCTURE: CPT | Performed by: PATHOLOGY

## 2023-10-17 PROCEDURE — 99214 OFFICE O/P EST MOD 30 MIN: CPT | Performed by: INTERNAL MEDICINE

## 2023-10-17 PROCEDURE — 99213 OFFICE O/P EST LOW 20 MIN: CPT | Performed by: INTERNAL MEDICINE

## 2023-10-17 PROCEDURE — 80053 COMPREHEN METABOLIC PANEL: CPT | Performed by: PATHOLOGY

## 2023-10-17 PROCEDURE — 85027 COMPLETE CBC AUTOMATED: CPT | Performed by: PATHOLOGY

## 2023-10-17 PROCEDURE — 85610 PROTHROMBIN TIME: CPT | Performed by: PATHOLOGY

## 2023-10-17 ASSESSMENT — PAIN SCALES - GENERAL: PAINLEVEL: NO PAIN (0)

## 2023-10-17 NOTE — NURSING NOTE
Chief Complaint   Patient presents with    RECHECK       BP (!) 142/99   Pulse 102   Temp 98.4  F (36.9  C) (Oral)   Wt 84.9 kg (187 lb 3.2 oz)   SpO2 100%   BMI 28.05 kg/m      Bucky Phillips on 10/17/2023 at 11:08 AM

## 2023-10-17 NOTE — PROGRESS NOTES
[facility] Hepatology    Follow-up Visit    Follow-up visit for alcohol-related liver disease.    Subjective:  33 year old male    Last visit February 2023.  No new medications, ER visits or hospitalizations since last visit.    Patient is well today.  He denies any symptoms related to liver disease    Patient denies jaundice, lower extremity edema, abdominal distension, lethargy or confusion.    Patient denies melena, hematemesis or hematochezia.    Patient denies fevers, sweats or chills.      Weight stable.  Appetite is normal.    Patient does not drink alcohol.  His last alcohol use was in August 2022.  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      Medical hx Surgical hx   Past Medical History:   Diagnosis Date    Alcohol use disorder, severe, in early remission (H)     Alcoholic hepatitis with ascites (H28) 08/2022    Alcoholic hepatitis without ascites (H28) 04/2021    Gout       Past Surgical History:   Procedure Laterality Date    PERCUTANEOUS LIVER BIOPSY  06/21/2021    ETOH hepatitis    WISDOM TOOTH EXTRACTION            Medications  Prior to Admission medications    Not on File       Allergies  No Known Allergies    Review of systems  A 10-point review of systems was negative    Examination  BP (!) 142/99   Pulse 102   Temp 98.4  F (36.9  C) (Oral)   Wt 84.9 kg (187 lb 3.2 oz)   SpO2 100%   BMI 28.05 kg/m    Body mass index is 28.05 kg/m .    Gen- well, NAD, A+Ox3, normal color  CVS- RRR  RS- CTA  Abd- striae, soft, non-tender, palpable liver edge, no ascites or splenomegaly on palpation, BS+  Extr- hands normal, no BISI  Skin- no rash or jaundice  Neuro- no asterixis  Psych- normal mood    Laboratory  Lab Results   Component Value Date     10/17/2023    POTASSIUM 4.1 10/17/2023    POTASSIUM 3.5 08/23/2022    CHLORIDE 104 10/17/2023    CHLORIDE 103 08/23/2022    CO2 27 10/17/2023    CO2 23 08/23/2022    BUN 11.1 10/17/2023    BUN 13 08/23/2022    CR 0.89  10/17/2023       Lab Results   Component Value Date    BILITOTAL 1.5 10/17/2023    ALT 36 10/17/2023    AST 32 10/17/2023    ALKPHOS 101 10/17/2023       Lab Results   Component Value Date    ALBUMIN 4.7 10/17/2023    ALBUMIN 3.7 02/15/2023    PROTTOTAL 8.3 10/17/2023        Lab Results   Component Value Date    WBC 9.2 10/17/2023    HGB 14.8 10/17/2023    MCV 83 10/17/2023     10/17/2023       Lab Results   Component Value Date    INR 1.32 10/17/2023       Radiology  Nil recent    Assessment  33 year old male who presents for follow-up of alcohol-related hepatitis with ascites.  MELD 3.0 = 10 (improved).  Last EtOH = August 2022.  Liver function tests improved since last visit with ongoing sobriety.  We will continue to monitor clinically for now as patient's symptoms remain resolved and his liver function tests continue to improve.  If liver function tests are not completely normal at next visit, will obtain liver biopsy to rule in/out cirrhosis, which would require ongoing care.    Plan  Continue abstinence from alcohol  Follow-up in person in 6 months    Mario Villa MD  Hepatology  [facility]    I spent 30 minutes on the date of the encounter doing chart review, history and exam, documentation and further activities as noted above.

## 2023-10-17 NOTE — LETTER
10/17/2023         RE: Uriel Soria  [address] [facility] [address]        Dear Colleague,    Thank you for referring your patient, Uriel Soria, to the [facility]PATOLOG[facility] [place]. Please see a copy of my visit note below.    [facility] Hepatology    Follow-up Visit    Follow-up visit for alcohol-related liver disease.    Subjective:  33 year old male    Last visit February 2023.  No new medications, ER visits or hospitalizations since last visit.    Patient is well today.  He denies any symptoms related to liver disease    Patient denies jaundice, lower extremity edema, abdominal distension, lethargy or confusion.    Patient denies melena, hematemesis or hematochezia.    Patient denies fevers, sweats or chills.      Weight stable.  Appetite is normal.    Patient does not drink alcohol.  His last alcohol use was in August 2022.  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      Medical hx Surgical hx   Past Medical History:   Diagnosis Date     Alcohol use disorder, severe, in early remission (H)      Alcoholic hepatitis with ascites (H28) 08/2022     Alcoholic hepatitis without ascites (H28) 04/2021     Gout       Past Surgical History:   Procedure Laterality Date     PERCUTANEOUS LIVER BIOPSY  06/21/2021    ETOH hepatitis     WISDOM TOOTH EXTRACTION            Medications  Prior to Admission medications    Not on File       Allergies  No Known Allergies    Review of systems  A 10-point review of systems was negative    Examination  BP (!) 142/99   Pulse 102   Temp 98.4  F (36.9  C) (Oral)   Wt 84.9 kg (187 lb 3.2 oz)   SpO2 100%   BMI 28.05 kg/m    Body mass index is 28.05 kg/m .    Gen- well, NAD, A+Ox3, normal color  CVS- RRR  RS- CTA  Abd- striae, soft, non-tender, palpable liver edge, no ascites or splenomegaly on palpation, BS+  Extr- hands normal, no BISI  Skin- no rash or jaundice  Neuro- no asterixis  Psych- normal  mood    Laboratory  Lab Results   Component Value Date     10/17/2023    POTASSIUM 4.1 10/17/2023    POTASSIUM 3.5 08/23/2022    CHLORIDE 104 10/17/2023    CHLORIDE 103 08/23/2022    CO2 27 10/17/2023    CO2 23 08/23/2022    BUN 11.1 10/17/2023    BUN 13 08/23/2022    CR 0.89 10/17/2023       Lab Results   Component Value Date    BILITOTAL 1.5 10/17/2023    ALT 36 10/17/2023    AST 32 10/17/2023    ALKPHOS 101 10/17/2023       Lab Results   Component Value Date    ALBUMIN 4.7 10/17/2023    ALBUMIN 3.7 02/15/2023    PROTTOTAL 8.3 10/17/2023        Lab Results   Component Value Date    WBC 9.2 10/17/2023    HGB 14.8 10/17/2023    MCV 83 10/17/2023     10/17/2023       Lab Results   Component Value Date    INR 1.32 10/17/2023       Radiology  Nil recent    Assessment  33 year old male who presents for follow-up of alcohol-related hepatitis with ascites.  MELD 3.0 = 10 (improved).  Last EtOH = August 2022.  Liver function tests improved since last visit with ongoing sobriety.  We will continue to monitor clinically for now as patient's symptoms remain resolved and his liver function tests continue to improve.  If liver function tests are not completely normal at next visit, will obtain liver biopsy to rule in/out cirrhosis, which would require ongoing care.    Plan  Continue abstinence from alcohol  Follow-up in person in 6 months    Mario Villa MD  Hepatology  [facility]    I spent 30 minutes on the date of the encounter doing chart review, history and exam, documentation and further activities as noted above.      Again, thank you for allowing me to participate in the care of your patient.        Sincerely,        Mario Villa MD

## 2023-10-21 ENCOUNTER — HEALTH MAINTENANCE LETTER (OUTPATIENT)
Age: 33
End: 2023-10-21

## 2024-04-18 DIAGNOSIS — K70.10 ALCOHOLIC HEPATITIS WITHOUT ASCITES (H): Primary | ICD-10-CM

## 2024-05-09 ENCOUNTER — LAB (OUTPATIENT)
Dept: LAB | Facility: CLINIC | Age: 34
End: 2024-05-09
Payer: COMMERCIAL

## 2024-05-09 DIAGNOSIS — K70.10 ALCOHOLIC HEPATITIS WITHOUT ASCITES (H): ICD-10-CM

## 2024-05-09 LAB
ERYTHROCYTE [DISTWIDTH] IN BLOOD BY AUTOMATED COUNT: 12.5 % (ref 10–15)
HCT VFR BLD AUTO: 45.4 % (ref 40–53)
HGB BLD-MCNC: 15.3 G/DL (ref 13.3–17.7)
INR PPP: 1.15 (ref 0.85–1.15)
MCH RBC QN AUTO: 28.2 PG (ref 26.5–33)
MCHC RBC AUTO-ENTMCNC: 33.7 G/DL (ref 31.5–36.5)
MCV RBC AUTO: 84 FL (ref 78–100)
PLATELET # BLD AUTO: 142 10E3/UL (ref 150–450)
RBC # BLD AUTO: 5.43 10E6/UL (ref 4.4–5.9)
WBC # BLD AUTO: 6.2 10E3/UL (ref 4–11)

## 2024-05-09 PROCEDURE — 82248 BILIRUBIN DIRECT: CPT

## 2024-05-09 PROCEDURE — 36415 COLL VENOUS BLD VENIPUNCTURE: CPT

## 2024-05-09 PROCEDURE — 85610 PROTHROMBIN TIME: CPT

## 2024-05-09 PROCEDURE — 85027 COMPLETE CBC AUTOMATED: CPT

## 2024-05-09 PROCEDURE — 80053 COMPREHEN METABOLIC PANEL: CPT

## 2024-05-10 LAB
ALBUMIN SERPL BCG-MCNC: 5.1 G/DL (ref 3.5–5.2)
ALP SERPL-CCNC: 91 U/L (ref 40–150)
ALT SERPL W P-5'-P-CCNC: 40 U/L (ref 0–70)
ANION GAP SERPL CALCULATED.3IONS-SCNC: 10 MMOL/L (ref 7–15)
AST SERPL W P-5'-P-CCNC: 32 U/L (ref 0–45)
BILIRUB DIRECT SERPL-MCNC: 0.24 MG/DL (ref 0–0.3)
BILIRUB SERPL-MCNC: 1.2 MG/DL
BUN SERPL-MCNC: 13.7 MG/DL (ref 6–20)
CALCIUM SERPL-MCNC: 9.8 MG/DL (ref 8.6–10)
CHLORIDE SERPL-SCNC: 104 MMOL/L (ref 98–107)
CREAT SERPL-MCNC: 0.92 MG/DL (ref 0.67–1.17)
DEPRECATED HCO3 PLAS-SCNC: 26 MMOL/L (ref 22–29)
EGFRCR SERPLBLD CKD-EPI 2021: >90 ML/MIN/1.73M2
GLUCOSE SERPL-MCNC: 95 MG/DL (ref 70–99)
POTASSIUM SERPL-SCNC: 4.2 MMOL/L (ref 3.4–5.3)
PROT SERPL-MCNC: 8.5 G/DL (ref 6.4–8.3)
SODIUM SERPL-SCNC: 140 MMOL/L (ref 135–145)

## 2024-05-13 ENCOUNTER — OFFICE VISIT (OUTPATIENT)
Dept: GASTROENTEROLOGY | Facility: CLINIC | Age: 34
End: 2024-05-13
Attending: INTERNAL MEDICINE
Payer: COMMERCIAL

## 2024-05-13 VITALS
TEMPERATURE: 98 F | RESPIRATION RATE: 18 BRPM | WEIGHT: 194.2 LBS | HEIGHT: 69 IN | SYSTOLIC BLOOD PRESSURE: 139 MMHG | HEART RATE: 92 BPM | OXYGEN SATURATION: 100 % | DIASTOLIC BLOOD PRESSURE: 92 MMHG | BODY MASS INDEX: 28.76 KG/M2

## 2024-05-13 DIAGNOSIS — K70.9 LIVER DISEASE, CHRONIC, DUE TO ALCOHOL (H): Primary | ICD-10-CM

## 2024-05-13 PROCEDURE — 99215 OFFICE O/P EST HI 40 MIN: CPT | Performed by: INTERNAL MEDICINE

## 2024-05-13 PROCEDURE — 99214 OFFICE O/P EST MOD 30 MIN: CPT | Performed by: INTERNAL MEDICINE

## 2024-05-13 ASSESSMENT — PAIN SCALES - GENERAL: PAINLEVEL: NO PAIN (0)

## 2024-05-13 NOTE — PATIENT INSTRUCTIONS
Plan  Continue to avoid alcohol  Tylenol is ok for pain  Get regular exercise  Check blood work in 6 months  Follow-up with me in 12 months    Mario Villa MD   hepatology

## 2024-05-13 NOTE — NURSING NOTE
"Chief Complaint   Patient presents with    RECHECK     Alcohol liver disease       Vital signs:  Temp: 98  F (36.7  C) Temp src: Oral BP: (!) 139/92 Pulse: 92   Resp: 18 SpO2: 100 %     Height: 174 cm (5' 8.5\") Weight: 88.1 kg (194 lb 3.2 oz)  Estimated body mass index is 29.1 kg/m  as calculated from the following:    Height as of this encounter: 1.74 m (5' 8.5\").    Weight as of this encounter: 88.1 kg (194 lb 3.2 oz).      Aparna Boyer, [facility]  5/13/2024 10:36 AM    "

## 2024-05-13 NOTE — LETTER
"    5/13/2024         RE: Uriel Soria  [address] [facility] [address]        Dear Colleague,    Thank you for referring your patient, Uriel Soria, to the [facility] HEPATOLOGY [facility] [place]. Please see a copy of my visit note below.    [facility] Hepatology    Follow-up Visit    Follow-up visit for alcohol-related liver disease.    Subjective:  34 year old male    Last visit October 2023.  No new medications, ER visits or hospital mission since last visit.    Patient is feeling well today.  He continues to feel better since getting sick in August 2022 and reports that he is more energy and feels stronger.    Patient denies jaundice, lower extremity edema, abdominal distension, lethargy or confusion.    Patient denies melena, hematemesis or hematochezia.    Patient denies fevers, sweats or chills.      Weight stable.  Appetite is good.    Patient does not drink alcohol.  He last drank alcohol in August 2022.  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  The good weather over the past weekend led to a big owusu and his office has been inundated with calls.      Medical hx Surgical hx   Past Medical History:   Diagnosis Date     Alcohol use disorder, severe, in early remission (H)      Alcoholic hepatitis with ascites (H28) 08/2022     Alcoholic hepatitis without ascites (H28) 04/2021     Gout       Past Surgical History:   Procedure Laterality Date     PERCUTANEOUS LIVER BIOPSY  06/21/2021    ETOH hepatitis     WISDOM TOOTH EXTRACTION            Medications  Prior to Admission medications    Not on File       Allergies  No Known Allergies    Review of systems  A 10-point review of systems was negative    Examination  BP (!) 139/92 (BP Location: Left arm, Patient Position: Sitting, Cuff Size: Adult Regular)   Pulse 92   Temp 98  F (36.7  C) (Oral)   Resp 18   Ht 1.74 m (5' 8.5\")   Wt 88.1 kg (194 lb 3.2 oz)   SpO2 100%   BMI 29.10 kg/m  "   Body mass index is 29.1 kg/m .    Gen- well, NAD, A+Ox3, normal color  CVS- RRR  RS- CTA  Abd- soft, non-tender, no evidence of ascites or organomegaly (improved- no palpable liver edge), BS+  Extr- hands normal, no BISI  Skin- no rash or jaundice  Neuro- no asterixis  Psych- normal mood    Laboratory  Lab Results   Component Value Date     05/09/2024    POTASSIUM 4.2 05/09/2024    POTASSIUM 3.5 08/23/2022    CHLORIDE 104 05/09/2024    CHLORIDE 103 08/23/2022    CO2 26 05/09/2024    CO2 23 08/23/2022    BUN 13.7 05/09/2024    BUN 13 08/23/2022    CR 0.92 05/09/2024       Lab Results   Component Value Date    BILITOTAL 1.2 05/09/2024    ALT 40 05/09/2024    AST 32 05/09/2024    ALKPHOS 91 05/09/2024       Lab Results   Component Value Date    ALBUMIN 5.1 05/09/2024    ALBUMIN 3.7 02/15/2023    PROTTOTAL 8.5 05/09/2024        Lab Results   Component Value Date    WBC 6.2 05/09/2024    HGB 15.3 05/09/2024    MCV 84 05/09/2024     05/09/2024       Lab Results   Component Value Date    INR 1.15 05/09/2024       Radiology  Nil recent    Assessment  34 year old male who presents for follow-up of alcohol-related liver disease.  MELD 3.0= 8 (improved).  Last ETOH = August 2022.  Physical exam improved since last visit- liver edge no longer palpable.  Liver function tests now normal with ongoing sobriety.  Mild thrombocytopenia stable since last visit and improved since October 2022.  Will continue to monitor clinically as blood work and physical examination have gradually improved.  Will repeat blood work in 6 months particularly to monitor thrombocytopenia.  If thrombocytopenia has not resolved by next clinic visit, would plan to obtain liver biopsy to definitively rule in or out cirrhosis as patient will have been more than 2 years sober from alcohol.     We discussed sensitivity and specificity of FibroScan vs liver biopsy with regard to evaluation for cirrhosis.    Plan  Continue abstinence from  alcohol  Low Na diet  Check CMP, INR, CBC in 6 months  Follow-up in 12 months  Liver biopsy if thrombocytopenia has not resolved by next clinic visit    Mario Villa MD  Hepatology  [facility]    I spent 40 minutes on the date of the encounter doing chart review, history and exam, documentation and further activities as noted above.       Again, thank you for allowing me to participate in the care of your patient.        Sincerely,        Mario Villa MD

## 2024-05-13 NOTE — PROGRESS NOTES
"[facility] Hepatology    Follow-up Visit    Follow-up visit for alcohol-related liver disease.    Subjective:  34 year old male    Last visit October 2023.  No new medications, ER visits or hospital mission since last visit.    Patient is feeling well today.  He continues to feel better since getting sick in August 2022 and reports that he is more energy and feels stronger.    Patient denies jaundice, lower extremity edema, abdominal distension, lethargy or confusion.    Patient denies melena, hematemesis or hematochezia.    Patient denies fevers, sweats or chills.      Weight stable.  Appetite is good.    Patient does not drink alcohol.  He last drank alcohol in August 2022.  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  The good weather over the past weekend led to a big owusu and his office has been inundated with calls.      Medical hx Surgical hx   Past Medical History:   Diagnosis Date    Alcohol use disorder, severe, in early remission (H)     Alcoholic hepatitis with ascites (H28) 08/2022    Alcoholic hepatitis without ascites (H28) 04/2021    Gout       Past Surgical History:   Procedure Laterality Date    PERCUTANEOUS LIVER BIOPSY  06/21/2021    ETOH hepatitis    WISDOM TOOTH EXTRACTION            Medications  Prior to Admission medications    Not on File       Allergies  No Known Allergies    Review of systems  A 10-point review of systems was negative    Examination  BP (!) 139/92 (BP Location: Left arm, Patient Position: Sitting, Cuff Size: Adult Regular)   Pulse 92   Temp 98  F (36.7  C) (Oral)   Resp 18   Ht 1.74 m (5' 8.5\")   Wt 88.1 kg (194 lb 3.2 oz)   SpO2 100%   BMI 29.10 kg/m    Body mass index is 29.1 kg/m .    Gen- well, NAD, A+Ox3, normal color  CVS- RRR  RS- CTA  Abd- soft, non-tender, no evidence of ascites or organomegaly (improved- no palpable liver edge), BS+  Extr- hands normal, no BISI  Skin- no rash or jaundice  Neuro- no " asterixis  Psych- normal mood    Laboratory  Lab Results   Component Value Date     05/09/2024    POTASSIUM 4.2 05/09/2024    POTASSIUM 3.5 08/23/2022    CHLORIDE 104 05/09/2024    CHLORIDE 103 08/23/2022    CO2 26 05/09/2024    CO2 23 08/23/2022    BUN 13.7 05/09/2024    BUN 13 08/23/2022    CR 0.92 05/09/2024       Lab Results   Component Value Date    BILITOTAL 1.2 05/09/2024    ALT 40 05/09/2024    AST 32 05/09/2024    ALKPHOS 91 05/09/2024       Lab Results   Component Value Date    ALBUMIN 5.1 05/09/2024    ALBUMIN 3.7 02/15/2023    PROTTOTAL 8.5 05/09/2024        Lab Results   Component Value Date    WBC 6.2 05/09/2024    HGB 15.3 05/09/2024    MCV 84 05/09/2024     05/09/2024       Lab Results   Component Value Date    INR 1.15 05/09/2024       Radiology  Nil recent    Assessment  34 year old male who presents for follow-up of alcohol-related liver disease.  MELD 3.0= 8 (improved).  Last ETOH = August 2022.  Physical exam improved since last visit- liver edge no longer palpable.  Liver function tests now normal with ongoing sobriety.  Mild thrombocytopenia stable since last visit and improved since October 2022.  Will continue to monitor clinically as blood work and physical examination have gradually improved.  Will repeat blood work in 6 months particularly to monitor thrombocytopenia.  If thrombocytopenia has not resolved by next clinic visit, would plan to obtain liver biopsy to definitively rule in or out cirrhosis as patient will have been more than 2 years sober from alcohol.     We discussed sensitivity and specificity of FibroScan vs liver biopsy with regard to evaluation for cirrhosis.    Plan  Continue abstinence from alcohol  Low Na diet  Check CMP, INR, CBC in 6 months  Follow-up in 12 months  Liver biopsy if thrombocytopenia has not resolved by next clinic visit    Mario Villa MD  Hepatology  [facility]    I spent 40 minutes on the date of the encounter doing chart review,  history and exam, documentation and further activities as noted above.

## 2024-11-13 DIAGNOSIS — K70.9 LIVER DISEASE, CHRONIC, DUE TO ALCOHOL (H): Primary | ICD-10-CM

## 2024-12-14 ENCOUNTER — HEALTH MAINTENANCE LETTER (OUTPATIENT)
Age: 34
End: 2024-12-14